# Patient Record
Sex: FEMALE | Race: WHITE | NOT HISPANIC OR LATINO | Employment: FULL TIME | URBAN - METROPOLITAN AREA
[De-identification: names, ages, dates, MRNs, and addresses within clinical notes are randomized per-mention and may not be internally consistent; named-entity substitution may affect disease eponyms.]

---

## 2017-11-29 ENCOUNTER — ALLSCRIPTS OFFICE VISIT (OUTPATIENT)
Dept: OTHER | Facility: OTHER | Age: 42
End: 2017-11-29

## 2017-11-30 ENCOUNTER — TRANSCRIBE ORDERS (OUTPATIENT)
Dept: ADMINISTRATIVE | Facility: HOSPITAL | Age: 42
End: 2017-11-30

## 2017-11-30 DIAGNOSIS — R06.83 SNORING: Primary | ICD-10-CM

## 2017-11-30 DIAGNOSIS — R53.83 OTHER FATIGUE: ICD-10-CM

## 2017-11-30 NOTE — PROGRESS NOTES
Assessment    1  Obesity, morbid (278 01) (E66 01)   2  Anxiety disorder (300 00) (F41 9)   3  Obstructive sleep apnea, adult (327 23) (G47 33)    Plan  Anxiety disorder    · Escitalopram Oxalate 10 MG Oral Tablet; Take 1 tablet daily  Depression    · BuPROPion HCl ER (XL) 150 MG Oral Tablet Extended Release 24 Hour (Wellbutrin XL); Take1 tablet twice daily  Obesity, morbid    · Referral to Weight Management [facility]  *  Status: Hold For - Scheduling Requested for: [id number]  Care Summary provided  : Yes  Obstructive sleep apnea, adult    · *Polysomnography, Sleep Study, CPAP/BiPAP titration; Status:Need Information - FinancialAuthorization; Requested for:29Nov2017;   there any other medical conditions or medications that would explain these    symptoms? : No  is the sleep disturbance affecting the patient's ability to function? : Fatigue    associated with the tiredness and tiredness and fatigue altogether causing depression  History/Symptoms: : Fatgue weight gain and depression  Study Only or Consult : Sleep Study and Consult and F/U with Sleep Specialist    Discussion/Summary  Counseling Documentation With Imm: The patient was counseled regarding diagnostic results,-- prognosis,-- impressions  Chief Complaint  Chief Complaint Free Text Note Form: pt  presents for follow up for depression  pt has not been taking Meds  History of Present Illness  Depression (Follow-Up): The patient states her depression has improved since the last visit  She describes this as moderate in severity  Interval Symptoms: worsened depression,-- worsened depressed mood,-- worsened loss of interest or pleasure in activities,-- worsened insomnia-- and-- worsened anxiety     Weight Gain:   Jaclyn Ruvalcaba presents with complaints of gradual onset of 30 - 39 pound weight gain (lost 7 lbs )  Associated symptoms include anxiety-- and-- depression, but-- no increased appetite,-- no constipation,-- no polyuria,-- no polydipsia,-- no leg edema-- and-- no facial swelling  Generalized Anxiety Disorder (Brief): The patient is being seen for an initial evaluation of an existing diagnosis of anxiety disorder  The patient presents with complaints of palpitations (better than better )  The patient is currently experiencing symptoms  Associated symptoms:  poor concentration, but-- no memory impairment  Review of Systems  Complete-Female:  Constitutional: feeling poorly-- and-- feeling tired, but-- No fever, no chills, feels well, no tiredness, no recent weight gain or weight loss  Eyes: No complaints of eye pain, no red eyes, no eyesight problems, no discharge, no dry eyes, no itching of eyes  ENT: no complaints of earache, no loss of hearing, no nose bleeds, no nasal discharge, no sore throat, no hoarseness  Cardiovascular: No complaints of slow heart rate, no fast heart rate, no chest pain, no palpitations, no leg claudication, no lower extremity edema  Respiratory: No complaints of shortness of breath, no wheezing, no cough, no SOB on exertion, no orthopnea, no PND  Gastrointestinal: No complaints of abdominal pain, no constipation, no nausea or vomiting, no diarrhea, no bloody stools  Genitourinary: No complaints of dysuria, no incontinence, no pelvic pain, no dysmenorrhea, no vaginal discharge or bleeding  Musculoskeletal: No complaints of arthralgias, no myalgias, no joint swelling or stiffness, no limb pain or swelling  Integumentary: No complaints of skin rash or lesions, no itching, no skin wounds, no breast pain or lump  Neurological: No complaints of headache, no confusion, no convulsions, no numbness, no dizziness or fainting, no tingling, no limb weakness, no difficulty walking  Psychiatric: as noted in HPI  Endocrine: No complaints of proptosis, no hot flashes, no muscle weakness, no deepening of the voice, no feelings of weakness    Hematologic/Lymphatic: No complaints of swollen glands, no swollen glands in the neck, does not bleed easily, does not bruise easily  Active Problems  1  Anxiety disorder (300 00) (F41 9)   2  Depression (311) (F32 9)   3  Hypercholesterolemia (272 0) (E78 00)   4  Obesity, morbid (278 01) (E66 01)    Past Medical History  1  History of Acute bronchitis (466 0) (J20 9)   2  History of Acute upper respiratory infection (465 9) (J06 9)   3  Denied: History of Carrier Of STD   4  History of Dizziness (780 4) (R42)   5  History of Fatigue (780 79) (R53 83)   6  History of anemia (V12 3) (Z86 2)   7  History of Irritable bowel syndrome (564 1) (K58 9)   8  History of Irritable bowel syndrome (564 1) (K58 9)   9  Denied: History of Mental Status Change   10  History of Need for prophylactic vaccination and inoculation against influenza (V04 81) (Z23)   11  History of Visit for dental examination (V72 2) (Z01 20)   12  History of Visit For: Routine Eye Exam (V72 0)   13  History of Weight gain (783 1) (R63 5)    Surgical History  1  History of Ankle Arthroscopy   2  History of Arthroscopy Ankle Left   3  History of Breast Surgery Reduction Procedure    Family History  Mother    1  Family history of Type 2 Diabetes Mellitus  Father    2  Family history of Hypertension (V17 49)   3  Family history of Type 2 Diabetes Mellitus    Social History     · Alcohol Use (History)   · Being A Social Drinker   · Denied: History of Exercising Regularly   · Marital History - Single   · Never A Smoker   · Never Used Drugs   · Occupation:    Current Meds   1  BuPROPion HCl ER (XL) 150 MG Oral Tablet Extended Release 24 Hour; Take 1 tablet twice daily; Therapy: 05YCP7724 to (Evaluate:16Kqm9299)  Requested for: 09UEB9768; Last Rx:01Wvf7023 Ordered   2  Escitalopram Oxalate 10 MG Oral Tablet; Take 1 tablet daily; Therapy: 59FDL2910 to (Benji Forward)  Requested for: 92XVH9564; Last Rx:94Eya6424 Ordered    Allergies  1   No Known Drug Allergies    Vitals  Vital Signs    Recorded: 29Nov2017 04:25PM Temperature 97 3 F, Tympanic   Heart Rate 336   Systolic 895, LUE, Sitting   Diastolic 92, LUE, Sitting   Height 5 ft 2 in   Weight 256 lb 6 oz   BMI Calculated 46 89   BSA Calculated 2 12   O2 Saturation 99, RA       Physical Exam   Constitutional  General appearance: No acute distress, well appearing and well nourished  Ears, Nose, Mouth, and Throat  External inspection of ears and nose: Normal    Otoscopic examination: Tympanic membranes translucent with normal light reflex  Canals patent without erythema  Pulmonary  Auscultation of lungs: Clear to auscultation  Cardiovascular  Auscultation of heart: Normal rate and rhythm, normal S1 and S2, without murmurs  Examination of extremities for edema and/or varicosities: Normal    Carotid pulses: Normal    Abdomen  Abdomen: Non-tender, no masses  Liver and spleen: No hepatomegaly or splenomegaly  Lymphatic  Palpation of lymph nodes in neck: No lymphadenopathy  Musculoskeletal  Gait and station: Normal    Digits and nails: Normal without clubbing or cyanosis  Inspection/palpation of joints, bones, and muscles: Normal    Skin  Skin and subcutaneous tissue: Normal without rashes or lesions  Neurologic  Cranial nerves: Cranial nerves 2-12 intact  Reflexes: 2+ and symmetric  Sensation: No sensory loss  Psychiatric  Orientation to person, place, and time: Normal    Mood and affect: Normal          Results/Data  PHQ-9 Adult Depression Screening 87YNW0167 04:31PM User, [facility]     Test Name Result Flag Reference   PHQ-9 Adult Depression Score 8       Over the last two weeks, how often have you been bothered by any of the following problems?  Little interest or pleasure in doing things: More than half the days - 2 Feeling down, depressed, or hopeless: More than half the days - 2 Trouble falling or staying asleep, or sleeping too much: Several days - 1 Feeling tired or having little energy: Nearly every day - 3 Poor appetite or over eating: Not at all - 0 Feeling bad about yourself - or that you are a failure or have let yourself or your family down: Not at all - 0 Trouble concentrating on things, such as reading the newspaper or watching television: Not at all - 0 Moving or speaking so slowly that other people could have noticed   Or the opposite -  being so fidgety or restless that you have been moving around a lot more than usual: Not at all - 0 Thoughts that you would be better off dead, or of hurting yourself in some way: Not at all - 0   PHQ-9 Adult Depression Screening Negative     PHQ-9 Difficulty Level Somewhat difficult     PHQ-9 Severity Mild Depression           Signatures   Electronically signed by : Aaron Plascencia MD; Nov 29 2017  4:56PM EST                       (Author)

## 2018-01-03 ENCOUNTER — ALLSCRIPTS OFFICE VISIT (OUTPATIENT)
Dept: OTHER | Facility: OTHER | Age: 43
End: 2018-01-03

## 2018-01-03 DIAGNOSIS — F32.9 MAJOR DEPRESSIVE DISORDER, SINGLE EPISODE: ICD-10-CM

## 2018-01-03 DIAGNOSIS — E78.00 PURE HYPERCHOLESTEROLEMIA: ICD-10-CM

## 2018-01-03 DIAGNOSIS — F41.9 ANXIETY DISORDER: ICD-10-CM

## 2018-01-03 DIAGNOSIS — E66.01 MORBID (SEVERE) OBESITY DUE TO EXCESS CALORIES (HCC): ICD-10-CM

## 2018-01-04 ENCOUNTER — TRANSCRIBE ORDERS (OUTPATIENT)
Dept: ADMINISTRATIVE | Age: 43
End: 2018-01-04

## 2018-01-04 ENCOUNTER — APPOINTMENT (OUTPATIENT)
Dept: LAB | Age: 43
End: 2018-01-04
Payer: COMMERCIAL

## 2018-01-04 ENCOUNTER — GENERIC CONVERSION - ENCOUNTER (OUTPATIENT)
Dept: OTHER | Facility: OTHER | Age: 43
End: 2018-01-04

## 2018-01-04 DIAGNOSIS — E78.00 PURE HYPERCHOLESTEROLEMIA: ICD-10-CM

## 2018-01-04 DIAGNOSIS — F41.9 ANXIETY DISORDER: ICD-10-CM

## 2018-01-04 DIAGNOSIS — E66.01 MORBID (SEVERE) OBESITY DUE TO EXCESS CALORIES (HCC): ICD-10-CM

## 2018-01-04 DIAGNOSIS — F32.9 MAJOR DEPRESSIVE DISORDER, SINGLE EPISODE: ICD-10-CM

## 2018-01-04 LAB
ALBUMIN SERPL BCP-MCNC: 3.5 G/DL (ref 3.5–5)
ALP SERPL-CCNC: 90 U/L (ref 46–116)
ALT SERPL W P-5'-P-CCNC: 25 U/L (ref 12–78)
ANION GAP SERPL CALCULATED.3IONS-SCNC: 8 MMOL/L (ref 4–13)
AST SERPL W P-5'-P-CCNC: 16 U/L (ref 5–45)
BILIRUB SERPL-MCNC: 0.42 MG/DL (ref 0.2–1)
BUN SERPL-MCNC: 11 MG/DL (ref 5–25)
CALCIUM SERPL-MCNC: 9.1 MG/DL (ref 8.3–10.1)
CHLORIDE SERPL-SCNC: 103 MMOL/L (ref 100–108)
CHOLEST SERPL-MCNC: 164 MG/DL (ref 50–200)
CO2 SERPL-SCNC: 26 MMOL/L (ref 21–32)
CREAT SERPL-MCNC: 0.78 MG/DL (ref 0.6–1.3)
GFR SERPL CREATININE-BSD FRML MDRD: 95 ML/MIN/1.73SQ M
GLUCOSE P FAST SERPL-MCNC: 100 MG/DL (ref 65–99)
HDLC SERPL-MCNC: 36 MG/DL (ref 40–60)
INSULIN SERPL-ACNC: 35.3 MU/L (ref 3–25)
LDLC SERPL CALC-MCNC: 101 MG/DL (ref 0–100)
POTASSIUM SERPL-SCNC: 3.9 MMOL/L (ref 3.5–5.3)
PROT SERPL-MCNC: 7.3 G/DL (ref 6.4–8.2)
SODIUM SERPL-SCNC: 137 MMOL/L (ref 136–145)
T4 FREE SERPL-MCNC: 0.97 NG/DL (ref 0.76–1.46)
TRIGL SERPL-MCNC: 134 MG/DL
TSH SERPL DL<=0.05 MIU/L-ACNC: 4.46 UIU/ML (ref 0.36–3.74)

## 2018-01-04 PROCEDURE — 80053 COMPREHEN METABOLIC PANEL: CPT

## 2018-01-04 PROCEDURE — 36415 COLL VENOUS BLD VENIPUNCTURE: CPT

## 2018-01-04 PROCEDURE — 83525 ASSAY OF INSULIN: CPT

## 2018-01-04 PROCEDURE — 84443 ASSAY THYROID STIM HORMONE: CPT

## 2018-01-04 PROCEDURE — 84439 ASSAY OF FREE THYROXINE: CPT

## 2018-01-04 PROCEDURE — 80061 LIPID PANEL: CPT

## 2018-01-14 VITALS
BODY MASS INDEX: 47.18 KG/M2 | SYSTOLIC BLOOD PRESSURE: 132 MMHG | TEMPERATURE: 97.3 F | HEIGHT: 62 IN | DIASTOLIC BLOOD PRESSURE: 92 MMHG | OXYGEN SATURATION: 99 % | HEART RATE: 110 BPM | WEIGHT: 256.38 LBS

## 2018-01-23 ENCOUNTER — HOSPITAL ENCOUNTER (OUTPATIENT)
Dept: SLEEP CENTER | Facility: CLINIC | Age: 43
Discharge: HOME | End: 2018-01-23
Payer: COMMERCIAL

## 2018-01-23 VITALS
RESPIRATION RATE: 15 BRPM | TEMPERATURE: 97.4 F | BODY MASS INDEX: 44.81 KG/M2 | HEIGHT: 63 IN | SYSTOLIC BLOOD PRESSURE: 118 MMHG | DIASTOLIC BLOOD PRESSURE: 76 MMHG | HEART RATE: 72 BPM | WEIGHT: 252.9 LBS

## 2018-01-23 DIAGNOSIS — R06.83 SNORING: ICD-10-CM

## 2018-01-23 DIAGNOSIS — R53.83 OTHER FATIGUE: ICD-10-CM

## 2018-01-23 DIAGNOSIS — G47.33 OSA (OBSTRUCTIVE SLEEP APNEA): ICD-10-CM

## 2018-01-23 PROCEDURE — 95810 POLYSOM 6/> YRS 4/> PARAM: CPT

## 2018-01-23 NOTE — RESULT NOTES
Verified Results  (1) COMPREHENSIVE METABOLIC PANEL 32MDS1801 97:41XE [facility] Order Number: [id number]     Test Name Result Flag Reference   SODIUM 137 mmol/L  136-145   POTASSIUM 3 9 mmol/L  3 5-5 3   CHLORIDE 103 mmol/L  100-108   CARBON DIOXIDE 26 mmol/L  21-32   ANION GAP (CALC) 8 mmol/L  4-13   BLOOD UREA NITROGEN 11 mg/dL  5-25   CREATININE 0 78 mg/dL  0 60-1 30   Standardized to IDMS reference method   CALCIUM 9 1 mg/dL  8 3-10 1   BILI, TOTAL 0 42 mg/dL  0 20-1 00   ALK PHOSPHATAS 90 U/L     ALT (SGPT) 25 U/L  12-78   Specimen collection should occur prior to Sulfasalazine and/or Sulfapyridine administration due to the potential for falsely depressed results  AST(SGOT) 16 U/L  5-45   Specimen collection should occur prior to Sulfasalazine administration due to the potential for falsely depressed results  ALBUMIN 3 5 g/dL  3 5-5 0   TOTAL PROTEIN 7 3 g/dL  6 4-8 2   eGFR 95 ml/min/1 73sq m     National Kidney Disease Education Program recommendations are as follows:  GFR calculation is accurate only with a steady state creatinine  Chronic Kidney disease less than 60 ml/min/1 73 sq  meters  Kidney failure less than 15 ml/min/1 73 sq  meters  GLUCOSE FASTING 100 mg/dL H 65-99   Specimen collection should occur prior to Sulfasalazine administration due to the potential for falsely depressed results  Specimen collection should occur prior to Sulfapyridine administration due to the potential for falsely elevated results  (1) LIPID PANEL, FASTING 85UIZ8077 08:28AM [facility] Order Number: [id number]     Test Name Result Flag Reference   CHOLESTEROL 164 mg/dL     HDL,DIRECT 36 mg/dL L 40-60   Specimen collection should occur prior to Metamizole administration due to the potential for falsley depressed results     LDL CHOLESTEROL CALCULATED 101 mg/dL H 0-100   Triglyceride:        Normal <150 mg/dl   Borderline High 150-199 mg/dl   High 200-499 mg/dl   Very High >499 mg/dl      Cholesterol:       Desirable <200 mg/dl    Borderline High 200-239 mg/dl    High >239 mg/dl      HDL Cholesterol:       High>59 mg/dL    Low <41 mg/dL      This screening LDL is a calculated result  It does not have the accuracy of the Direct Measured LDL in the monitoring of patients with hyperlipidemia and/or statin therapy  Direct Measure LDL (UUP137) must be ordered separately in these patients  TRIGLYCERIDES 134 mg/dL  <=150   Specimen collection should occur prior to N-Acetylcysteine or Metamizole administration due to the potential for falsely depressed results  (1) TSH WITH FT4 REFLEX 96XFX4501 08:28AM Laura Ovi    Order Number: [id number]     Test Name Result Flag Reference   TSH 4 460 uIU/mL H 0 358-3 740   Patients undergoing fluorescein dye angiography may retain small amounts of fluorescein in the body for 48-72 hours post procedure  Samples containing fluorescein can produce falsely depressed TSH values  If the patient had this procedure,a specimen should be resubmitted post fluorescein clearance  The recommended reference ranges for TSH during pregnancy are as follows:  First trimester 0 1 to 2 5 uIU/mL  Second trimester  0 2 to 3 0 uIU/mL  Third trimester 0 3 to 3 0 uIU/m   T4,FREE 0 97 ng/dL  0 76-1 46   Specimen collection should occur prior to Sulfasalazine administration due to the potential for falsely elevated results       (1) INSULIN, FASTING 89EFH7355 08:28AM Justine Aponte Order Number: [id number]     Test Name Result Flag Reference   INSULIN, FASTING 35 3 mU/L H 3 0-25 0

## 2018-01-23 NOTE — PROGRESS NOTES
Message  Reviewed VLCD diet principles  Developed meal plan and reviewed product use  Ordered 2 week supply  Gave patient written VLCD packet and left patient contact number  Will call patent in 2-3 days to see how tolerating the VLCD diet  Active Problems    1  Anxiety disorder (300 00) (F41 9)   2  Depression (311) (F32 9)   3  Hypercholesterolemia (272 0) (E78 00)   4  Obesity, morbid (278 01) (E66 01)   5  Obstructive sleep apnea, adult (327 23) (G47 33)    Current Meds   1  BuPROPion HCl ER (XL) 150 MG Oral Tablet Extended Release 24 Hour (Wellbutrin XL); Take 1 tablet twice daily; Therapy: 25GHZ2012 to (Venia January)  Requested for: 64UNF9194; Last   Rx:29Nov2017 Ordered    Allergies    1   No Known Drug Allergies    Signatures   Electronically signed by : Emily Sher, ; Vladimir  3 2018  2:45PM EST                       (Author)

## 2018-01-23 NOTE — CONSULTS
Chief Complaint  Chief Complaint Free Text Note Form: New patient here for MWM consult; Rosalba Campbell 3/8      History of Present Illness  Free Text HPI: Obesity-  Severity: Severe  Onset: since childhood  Modifiers: family hx, occasional stress eating, mindless eating; has tried low carb, [employer], and diet and exercise  Associated Symptoms: increased joint pain, out of breath w/ activity    Goal: wants to feel better    B-cereal or oatmeal w/ 2% milk or pre-made sandwich  S-fruit  L-sandwich w/ deli meat and cheese w/ pretzels or salad  S-unhealthy snack  D-slightly larger portions-home cooked-protein,starch and veggie  S- sometimes-ice cream or popcorn  Dines out once week  Drinks: water-~75oz, unsweetened iced tea, sweetened beverage once per week    Exercise: none     Past Medical History    1  History of Acute bronchitis (466 0) (J20 9)   2  History of Acute upper respiratory infection (465 9) (J06 9)   3  Denied: History of Carrier Of STD   4  History of Dizziness (780 4) (R42)   5  History of Fatigue (780 79) (R53 83)   6  History of anemia (V12 3) (Z86 2)   7  History of Irritable bowel syndrome (564 1) (K58 9)   8  History of Irritable bowel syndrome (564 1) (K58 9)   9  Denied: History of Mental Status Change   10  History of Need for prophylactic vaccination and inoculation against influenza (V04 81)    (Z23)   11  History of Visit for dental examination (V72 2) (Z01 20)   12  History of Visit For: Routine Eye Exam (V72 0)   13  History of Weight gain (783 1) (R63 5)  Active Problems And Past Medical History Reviewed: The active problems and past medical history were reviewed and updated today  Assessment    1  Obesity, morbid (278 01) (E66 01)   2  Hypercholesterolemia (272 0) (E78 00)   3  Depression (311) (F32 9)   4  Anxiety disorder (300 00) (F41 9)    Plan   1  (1) COMPREHENSIVE METABOLIC PANEL; Status:Active; Requested ANI:03ECC6705;    2  (1) INSULIN, FASTING; Status:Active;  Requested [id number];    3  (1) LIPID PANEL, FASTING; Status:Active; Requested XBX:95ATL8987;    4  (1) TSH WITH FT4 REFLEX; Status:Active; Requested IVW:85MWK9049; Discussion/Summary  Discussion Summary:     53-year-old female with depression/anxiety, Obstructive sleep apnea, hyperlipidemia and morbid obesity here to pursue medical weight management to improve weight and health  Obesity class 3:  -discussed options of conservative vs BRET program +/- meal replacement vs VLCD and bariatric surgery  -initial focus of 5-10% weight loss over 3-6 mos for improved health  -screening labs    Hyperlipidemia:  -repeat FLP  -lifestyle changes for now    Anxiety/depression:  -on bupropion and Lexapro  -could consider adding naltrexone to mimic Contrave    Patient is interested in very low-calorie diet  Will set up visit with dietitian  Patient's Capacity to Self-Care: Patient is able to Self-Care  Bariatric Medicine Diagnostic Constellation:   Patient Discussion: 45 minute visit, greater than half of the time was spent on counseling  Counseling spent on surgical and nonsurgical interventions for the treatment of excess weight including intragastric balloon  Discussed the advantages and long-term outcomes with regards to bariatric surgery  Discussed in detail nonsurgical options including intensive lifestyle intervention program, very low-calorie diet program and conservative program   Discussed the role of weight loss medications  Counseled patient on diet behavior and exercise modification for weight loss  Understands and agrees with treatment plan: The treatment plan was reviewed with the patient/guardian  The patient/guardian understands and agrees with the treatment plan   Self Referrals:   Self Referrals: No      Active Problems    1  Anxiety disorder (300 00) (F41 9)   2  Depression (311) (F32 9)   3  Hypercholesterolemia (272 0) (E78 00)   4  Obesity, morbid (278 01) (E66 01)   5   Obstructive sleep apnea, adult (739 23) (G45 33)    Surgical History    1  History of Ankle Arthroscopy   2  History of Arthroscopy Ankle Left   3  History of Breast Surgery Reduction Procedure   4  History of Knee Surgery  Surgical History Reviewed: The surgical history was reviewed and updated today  Family History  Mother    1  Family history of Type 2 Diabetes Mellitus  Father    2  Family history of Hypertension (V17 49)   3  Family history of Type 2 Diabetes Mellitus  Family History Reviewed: The family history was reviewed and updated today  Social History    · Alcohol Use (History)   · Being A Social Drinker   · Denied: History of Exercising Regularly   · Marital History - Single   · Never A Smoker   · Never Used Drugs   · Occupation:  Social History Reviewed: The social history was reviewed and updated today  Current Meds   1  BuPROPion HCl ER (XL) 150 MG Oral Tablet Extended Release 24 Hour; Take 1 tablet   twice daily; Therapy: 70BXT9016 to (Margarita Francisco)  Requested for: 29Nov2017; Last   Rx:29Nov2017 Ordered  Medication List Reviewed: The medication list was reviewed and updated today  Allergies    1  No Known Drug Allergies    Vitals  Vital Signs    Recorded: 42WYQ5935 10:58AM   Temperature 97 4 F   Heart Rate 72   Respiration 15   Systolic 733   Diastolic 76   Height 5 ft 3 in   Weight 252 lb 14 4 oz   BMI Calculated 44 8   BSA Calculated 2 14   Waist Circumference 47 75   Neck Circumference 15 5     Physical Exam    Constitutional   General appearance: Abnormal   well developed and obese  Eyes No conjunctival pallor  Ears, Nose, Mouth, and Throat Slightly dry oral mucosa  Pulmonary   Respiratory effort: No increased work of breathing or signs of respiratory distress  Auscultation of lungs: Clear to auscultation  Cardiovascular   Auscultation of heart: Normal rate and rhythm, normal S1 and S2, without murmurs  Abdomen   Abdomen: Abnormal   The abdomen was obese   The abdomen was soft and nontender  Musculoskeletal   Gait and station: Normal     Psychiatric   Orientation to person, place, and time: Normal          Results/Data  STOP BANG Questionnaire 38CGJ8510 11:11AM User, [facility]     Test Name Result Flag Reference   STOP BANG Questionnaire Risk of MILTON Intermediate Risk     Snoring: Yes  Tired: Yes  Observed: No  Blood Pressure: No  BMI: Yes  Age: No  Neck Circumference: No  Gender: No   STOP BANG Questionnaire MILTON Total Score 3     Snoring: Yes  Tired: Yes  Observed: No  Blood Pressure: No  BMI: Yes  Age: No  Neck Circumference: No  Gender: No       Future Appointments    Date/Time Provider Specialty Site   01/03/2018 01:15 PM [facility]   01/18/2018 10:30 AM [facility]   02/01/2018 10:30 AM nAthony [facility]amping  WEIGHT MANAGEMENT CENTER   02/15/2018 10:30 AM KOLBY Lozano   Bariatric Medicine [facility]   01/10/2018 10:15 AM Ni Mcneal MD Internal Medicine [facility]     Signatures   Electronically signed by : KOLBY Mccoy ; Vladimir  3 2018 12:19PM EST                       (Author)

## 2018-01-29 ENCOUNTER — TELEPHONE (OUTPATIENT)
Dept: SLEEP CENTER | Facility: CLINIC | Age: 43
End: 2018-01-29

## 2018-01-29 ENCOUNTER — TRANSCRIBE ORDERS (OUTPATIENT)
Dept: SLEEP CENTER | Facility: CLINIC | Age: 43
End: 2018-01-29

## 2018-01-29 DIAGNOSIS — G47.33 OSA (OBSTRUCTIVE SLEEP APNEA): Primary | ICD-10-CM

## 2018-02-14 ENCOUNTER — OFFICE VISIT (OUTPATIENT)
Dept: BARIATRICS | Facility: CLINIC | Age: 43
End: 2018-02-14

## 2018-02-14 VITALS
HEIGHT: 63 IN | HEART RATE: 72 BPM | SYSTOLIC BLOOD PRESSURE: 110 MMHG | TEMPERATURE: 97.2 F | DIASTOLIC BLOOD PRESSURE: 68 MMHG | WEIGHT: 246.5 LBS | RESPIRATION RATE: 18 BRPM | BODY MASS INDEX: 43.68 KG/M2

## 2018-02-14 DIAGNOSIS — E66.01 OBESITY, CLASS III, BMI 40-49.9 (MORBID OBESITY) (HCC): Primary | ICD-10-CM

## 2018-02-14 PROCEDURE — RECHECK: Performed by: DIETITIAN, REGISTERED

## 2018-02-14 RX ORDER — BUPROPION HYDROCHLORIDE 150 MG/1
150 TABLET ORAL 2 TIMES DAILY
COMMUNITY
Start: 2018-01-23 | End: 2018-05-11 | Stop reason: SDUPTHER

## 2018-02-14 RX ORDER — ESCITALOPRAM OXALATE 10 MG/1
1 TABLET ORAL DAILY
COMMUNITY
Start: 2014-11-17 | End: 2018-03-14

## 2018-02-14 NOTE — PROGRESS NOTES
Bariatric Nutrition Assessment Note    Type of surgery    Preop    Surgeon: Dr Yoana Benjamin  38 y o   female     Wt with BMI of 25: 141lb   Pre-Op Excess Wt: 105 5 lb  Vitals:    02/14/18 0904   BP: 110/68   Pulse: 72   Resp: 18   Temp: (!) 97 2 °F (36 2 °C)   Ht 5ft  in  Wt 246 5 lb  BMI 43 7    Weight History   Onset of Obesity: Childhood  Family history of obesity: Yes  Wt Loss Attempts: Commercial Programs ([employer]rp, Trueffect Mini, etc )  Meal Replacements (Medifast, Slim Fast, etc )  Nutrition Counseling with RD  Medically supervised program at [facility]  Maximum Wt Lost: 50 lb    Review of History and Medications   Past Medical History:   Diagnosis Date    Morbid obesity ([address] )      Past Surgical History:   Procedure Laterality Date    ANKLE SURGERY      right    ANTERIOR CRUCIATE LIGAMENT REPAIR      KNEE CARTILAGE SURGERY      right    REDUCTION MAMMAPLASTY       Social History     Social History    Marital status: Single     Spouse name: N/A    Number of children: N/A    Years of education: N/A     Social History Main Topics    Smoking status: Never Smoker    Smokeless tobacco: Never Used    Alcohol use Yes      Comment: social    Drug use: No    Sexual activity: Not Asked     Other Topics Concern    None     Social History Narrative    None       Current Outpatient Prescriptions:     buPROPion (WELLBUTRIN XL) 150 mg 24 hr tablet, , Disp: , Rfl:     escitalopram (LEXAPRO) 10 mg tablet, Take 1 tablet by mouth daily, Disp: , Rfl:   Food Intake and Lifestyle Assessment   Food Intake Assessment completed via food log brought by patient  Beverage intake: water and coffee/tea  Protein supplement: none  Estimated protein intake per day: 60 grams  Estimated fluid intake per day: 64 ounces or more  Meals eaten away from home: 1 per week or less   Typical meal pattern: 3 meals per day and 1 snacks per day  Eating Behaviors: Consumption of high calorie/ high fat foods  Food allergies or intolerances: No Known Allergies  Cultural or Tenriism considerations: n/a    Physical Assessment  Physical Activity  Types of exercise: Walking  Current physical limitations: knee pain    Psychosocial Assessment   Support systems: spouse friend(s)  Socioeconomic factors: none noted    Nutrition Diagnosis  Diagnosis: Overweight / Obesity (NC-3 3)  Related to: Physical inactivity and Excessive energy intake  As Evidenced by: BMI >35     Nutrition Prescription: Recommend the following diet  Regular    Interventions and Teaching   Discussed pre-op and post-op nutrition guidelines  Patient educated and handouts provided  Surgical changes to stomach / GI  Capacity of post-surgery stomach  Diet progression  Adequate hydration  Sugar and fat restriction to decrease "dumping syndrome"  Exercise  Suggestions for pre-op diet  Protein supplements  Dietary and lifestyle changes  Techniques for self monitoring and keeping daily food journal  Vitamin / Mineral supplementation of Multivitamin with minerals and Vitamin D    Education provided to: patient    Barriers to learning: Craves sweet foods    Readiness to change: preparation    Prior research on procedure: discussed with provider, pre-op class and friends or family    Comprehension: demonstrated understanding     Expected Compliance: good  Recommendations  Pt is an appropriate candidate for surgery   Yes  Evaluation / Monitoring  Dietitian to Monitor: Eating pattern as discussed    Goals  Food journal, Complete lession plans 1-6 and food log 1500 calories or less    Time Spent:   1 Hour

## 2018-02-14 NOTE — PROGRESS NOTES
Bariatric Behavioral Health Evaluation    Presenting Problem: Shelia Walters, :  1975    Patient here to increase health, increase mobility, reduce chronic pain    Is the patient seeking Bariatric Surgery Eval? Yes  If yes how long have you researched this surgery option  Patient has been researching bariatric surgery for approximately 1 year  She states she has numerous community contacts with bariatric surgery    Realizes Post- Op Requirements? Yes Patient has discussed bariatric surgery with PCP    Pre-morbid level of function and history of present illness: Patient denies Axis I diagnosis or treatment  Has family members with health conditions    Psychiatric/Psychological Treatment Diagnosis: Patient denies    Outpatient Counselor No  Patient has never utilized    Psychiatrist No  Denies use    Have you had Inpatient Treatment?  No    Family Constellation (include relationship with each and Psych/Med HX)    Mother  obesity, Father  obesity and Siblings  obesity    Domestic Violence No     Childhood trauma:  No    Additional comments/stressors related to family/relationships/peer support:  Own health, weight and chronic pain, health of family members, work    Physical/Psychological Assessment:     Appearance: appropriate  Sociability: average  Affect: appropriate  Mood: calm  Thought Process: coherent  Speech: normal  Content: no impairment  Orientation: person  Yes , place  Yes , time  Yes , normal attention span  Yes , normal memory  Yes  , decreased in concentration ability  Yes  and normal judgement  Yes   Insight: emotional  good    Risk Assessment:     none    Recommendations: Recommended for surgery  yes    Risk of Harm to Self or Others:     Observation:     Interviews this interview only    Access to weapons yes     Weapons secured by:  Patient states at work    Based on the previous information, the client presents the following risk of harm to self or others: low    BARIATRIC SURGERY EDUCATION CHECKLIST    I have received education related to my bariatric surgery process and understand:    Patients may be required to complete a psychiatric evaluation and receive clearance for surgery from their psychiatrist     Patients who undergo weight loss surgery are at higher risk of increased mental health concerns and suicide attempts  Patients may be required to complete a full substance abuse evaluation and then complete all treatment recommendations prior to surgery  If diagnosis of abuse/dependence results, patient may be required to remain sober for one (1) year before having bariatric surgery  Patients on psychiatric medications should check with their provider to discuss psychiatric medications and the changes in absorption  Patient should discuss all time release medications with provider and take all medications as prescribed  The recommendation is that there is no use of  any tobacco products, Hookah or  vapes for the bariatric post-operation patient  Bariatric surgery patients should not consume alcohol as a post-operative patient as it may increase risk of numerous health conditions including but not limited to alcohol abuse and ulcers  There is a possibility of weight regain if patient does not follow all program guidelines and recommendations  Bariatric surgery patients should exercise thirty (30) to sixty (60) minutes per day to maintain post-surgical weight loss  Research indicates that bariatric patients are more successful when they see a therapist for up to two (2) years post-op  Patients will follow all medical and dietary recommendations provided  Patient will keep all scheduled appointments and follow up with their physician for a minimum of five (5) years  Patient will take all vitamins as recommended  Post-operative vitamins are life-long      Patient reviewed Bariatric Surgery Education Checklist and agrees they have received education on these issues   Note:  Patient denies Axis I diagnosis or treatment  Patient educated regarding the impact of nicotine and alcohol on the post bariatric surgery patient  Patient meets criteria for surgery at this program and is referred to physician

## 2018-02-15 DIAGNOSIS — E66.01 MORBID OBESITY (HCC): Primary | ICD-10-CM

## 2018-03-08 ENCOUNTER — OFFICE VISIT (OUTPATIENT)
Dept: SLEEP CENTER | Facility: CLINIC | Age: 43
End: 2018-03-08
Payer: COMMERCIAL

## 2018-03-08 VITALS
BODY MASS INDEX: 43.05 KG/M2 | HEART RATE: 80 BPM | WEIGHT: 243 LBS | DIASTOLIC BLOOD PRESSURE: 84 MMHG | HEIGHT: 63 IN | SYSTOLIC BLOOD PRESSURE: 128 MMHG

## 2018-03-08 DIAGNOSIS — G47.33 OSA (OBSTRUCTIVE SLEEP APNEA): ICD-10-CM

## 2018-03-08 PROCEDURE — 99243 OFF/OP CNSLTJ NEW/EST LOW 30: CPT | Performed by: INTERNAL MEDICINE

## 2018-03-08 NOTE — PROGRESS NOTES
Consultation - [address] : 1975  MRN: [id number]      Assessment:  The patient has moderate obstructive sleep apnea diagnosed on recent polysomnography and mild daytime sleepiness  Although she is somewhat reluctant to start positive airway pressure therapy, given the potential risks of bariatric surgery with regards to her sleep apnea, I encouraged her to get set up with the equipment  Plan:  APAP 4 to 20 cm    Follow up:  6 to 8 weeks for compliance check    History of Present Illness:   38 y  o female with a longstanding history of snoring, in the absence of witnessed apneas  In the past, she had more severe daytime sleepiness, which has improved over the last several months  She denies hypertension or other cardiovascular disease  She has occasional morning headache  She denies awakening with a choking or gasping sensation  Of note, her  has obstructive sleep apnea for which she had use CPAP for some time  Due to illness, he lost a great deal of weight and no longer requires positive airway pressure therapy      Review of Systems:      Genitourinary none   Cardiology none   Gastrointestinal heartburn/acid reflux   Neurology headaches   Constitutional none   Integumentary none   Psychiatry anxiety and depression   Musculoskeletal none   Pulmonary none   ENT nasal or sinus congestion and post nasal drip   Endocrine none   Hematological none           Historical Information    Past Medical History:  Obesity, anxiety and depression      Social History  History   Alcohol use: Not on file     History   Drug Use Not on file     History   Smoking Status    Not on file   Smokeless Tobacco    Not on file     Family History: non-contributory     Sleep History: See above     Sleep Schedule:  Unremarkable     Snoring/Apnea:  Yes to both    Medications/Allergies:    Current Outpatient Prescriptions:     buPROPion (WELLBUTRIN XL) 150 mg 24 hr tablet, Take 150 mg by mouth 2 (two) times a day  , Disp: , Rfl:     escitalopram (LEXAPRO) 10 mg tablet, Take 1 tablet by mouth daily, Disp: , Rfl:         No notes on file                  Objective:    Vital Signs:   Vitals:    03/08/18 1000   BP: 128/84   Pulse: 80   Weight: 110 kg (243 lb)   Height: 5' 3" (1 6 m)     Neck Circumference: 38      [place] Sleepiness Scale: Total score: 9    Physical Exam:    General: Alert, appropriate, cooperative, overweight    Head: NC/AT, no retrognathia    Nose: No septal deviation, nares partially obstructed, mucosa normal    Throat: Airway lumen narrowed, tongue base thickened, no tonsils visualized    Extremity: No clubbing, cyanosis    Skin: Warm, dry    Neuro: No motor abnormalities, cranial nerves appear intact    Sleep Study Results:   AHI = 15 4  PAP Pressure: Auto PAP: 4 to 20 cm to be initiated  DME Provider: [employer]    Counseling / Coordination of Care  Total clinic time spent today 25 minutes  Greater than 50% of total time was spent with the patient and / or family counseling and / or coordination of care  A description of the counseling / coordination of care: We discussed the pathophysiology of obstructive sleep apnea as well as the possible treatment options  We also discussed the rationale for positive airway pressure therapy  KOLBY Liang    Board Certified Sleep Specialist

## 2018-03-14 ENCOUNTER — OFFICE VISIT (OUTPATIENT)
Dept: CARDIOLOGY CLINIC | Facility: CLINIC | Age: 43
End: 2018-03-14
Payer: COMMERCIAL

## 2018-03-14 VITALS
WEIGHT: 241 LBS | SYSTOLIC BLOOD PRESSURE: 120 MMHG | BODY MASS INDEX: 42.7 KG/M2 | DIASTOLIC BLOOD PRESSURE: 78 MMHG | HEIGHT: 63 IN | HEART RATE: 67 BPM

## 2018-03-14 DIAGNOSIS — Z01.810 PREOPERATIVE CARDIOVASCULAR EXAMINATION: Primary | ICD-10-CM

## 2018-03-14 DIAGNOSIS — E66.01 MORBID OBESITY (HCC): ICD-10-CM

## 2018-03-14 PROCEDURE — 99243 OFF/OP CNSLTJ NEW/EST LOW 30: CPT | Performed by: INTERNAL MEDICINE

## 2018-03-14 PROCEDURE — 93000 ELECTROCARDIOGRAM COMPLETE: CPT | Performed by: INTERNAL MEDICINE

## 2018-03-14 NOTE — LETTER
March 14, 2018     Quezeny Basil, [address] [facility] [address] [facility]    Patient: Daniel Sandoval   YOB: 1975   Date of Visit: 3/14/2018       Dear Dr Wayne Metzger: Thank you for referring [place] Parariela to me for evaluation  Below are my notes for this consultation  If you have questions, please do not hesitate to call me  I look forward to following your patient along with you  Sincerely,        Bro Weiss MD        CC: No Recipients  Bro Weiss MD  3/14/2018 10:39 AM  Sign at close encounter                                             Cardiology Preoperative Visit    Daniel Sandoval  1975  [id number]  [address]    1  Preoperative cardiovascular examination  POCT ECG   2  [facility] ([facility] )         Discussion/Summary:    Morbid obesity  Comes for preoperative evaluation prior to bariatric surgery  No symptoms  Lipid panel reviewed with patient  Acceptable  Lifestyle modification  MILTON, to have CPAP  There are no cardiac contraindications to proceeding with the proposed surgery  The patient is at acceptable risk to proceed with no changes to medical therapy and no additional testing  History Of Present Illness:   38year old female  Depression and anxiety, both treated  She is morbidly obese, comes for preoperative evaluation prior to bariatric surgery  She exercises, denies any limitations with this  Has some shortness of breath with exercise, but not out of what she would expect  No chest pain  No PND, orthopnea, edema, palpitations, presyncope, syncope  Has sleep apnea - seen specialist, and to have CPAP titration study      Past Medical History:   Diagnosis Date    Morbid obesity ([facility] )      Social History     Social History    Marital status: Single     Spouse name: N/A    Number of children: N/A    Years of education: N/A Occupational History    Not on file  Social History Main Topics    Smoking status: Never Smoker    Smokeless tobacco: Never Used    Alcohol use Yes      Comment: social    Drug use: No    Sexual activity: Not on file     Other Topics Concern    Not on file     Social History Narrative    No narrative on file      Family History   Problem Relation Age of Onset    Hypertension Mother     Diabetes Mother     Heart attack Mother     Hypertension Father     Diabetes Father      Past Surgical History:   Procedure Laterality Date    ANKLE SURGERY      right    ANTERIOR CRUCIATE LIGAMENT REPAIR      KNEE CARTILAGE SURGERY      right    REDUCTION MAMMAPLASTY         Current Outpatient Prescriptions:     buPROPion (WELLBUTRIN XL) 150 mg 24 hr tablet, Take 150 mg by mouth 2 (two) times a day  , Disp: , Rfl:   No Known Allergies    Vitals:    03/14/18 1009   BP: 120/78   BP Location: Right arm   Patient Position: Sitting   Cuff Size: Large   Pulse: 67   Weight: 109 kg (241 lb)   Height: 5' 3" (1 6 m)     Vitals:    03/14/18 1009   Weight: 109 kg (241 lb)      Height: 5' 3" (160 cm)   Body mass index is 42 69 kg/m²  Physical Exam:  GENERAL: Alert, well appearing, and in no distress  HEENT:  PERRL, EOMI, no scleral icterus, no conjunctival pallor  NECK:  Supple, No elevated JVP, no thyromegaly, no carotid bruits  HEART:  Regular rate and rhythm, normal S1/S2, no S3/S4, no murmur or rub  LUNGS:  Clear to auscultation bilaterally  ABDOMEN:  Soft, non-tender, positive bowel sounds, no rebound or guarding  EXTREMITIES:  No edema  VASCULAR:  Normal pedal pulses   NEURO: No focal deficits,  SKIN: Normal without suspicious lesions on exposed skin      ROS:  Positive for snoring, fatigue, MILTON symptoms, heartburn, back pain, anxiety, depression  Except as noted in HPI, is otherwise reviewed in detail and a 12 point review of systems is negative      Labs:  Lab Results   Component Value Date     01/04/2018    K 3 9 01/04/2018     01/04/2018    CREATININE 0 78 01/04/2018    BUN 11 01/04/2018    CO2 26 01/04/2018    ALT 25 01/04/2018    AST 16 01/04/2018    GLUF 100 (H) 01/04/2018        Lab Results   Component Value Date    CHOL 164 01/04/2018     Lab Results   Component Value Date    HDL 36 (L) 01/04/2018     Lab Results   Component Value Date    LDLCALC 101 (H) 01/04/2018     Lab Results   Component Value Date    TRIG 134 01/04/2018     EKG:  Sinus rhythm, 67 beats per minute

## 2018-03-14 NOTE — PROGRESS NOTES
Cardiology Preoperative Visit    Marlene Sacks  1975  [id number]  [address] [facility]  [address] [place] [address] [address]    1  Preoperative cardiovascular examination  POCT ECG   2  Morbid obesity ([facility] 75 )         Discussion/Summary:    Morbid obesity  Comes for preoperative evaluation prior to bariatric surgery  No symptoms  Lipid panel reviewed with patient  Acceptable  Lifestyle modification  MILTON, to have CPAP  There are no cardiac contraindications to proceeding with the proposed surgery  The patient is at acceptable risk to proceed with no changes to medical therapy and no additional testing  History Of Present Illness:   38year old female  Depression and anxiety, both treated  She is morbidly obese, comes for preoperative evaluation prior to bariatric surgery  She exercises, denies any limitations with this  Has some shortness of breath with exercise, but not out of what she would expect  No chest pain  No PND, orthopnea, edema, palpitations, presyncope, syncope  Has sleep apnea - seen specialist, and to have CPAP titration study  Past Medical History:   Diagnosis Date    Morbid obesity ([place] [address] )      Social History     Social History    Marital status: Single     Spouse name: N/A    Number of children: N/A    Years of education: N/A     Occupational History    Not on file       Social History Main Topics    Smoking status: Never Smoker    Smokeless tobacco: Never Used    Alcohol use Yes      Comment: social    Drug use: No    Sexual activity: Not on file     Other Topics Concern    Not on file     Social History Narrative    No narrative on file      Family History   Problem Relation Age of Onset    Hypertension Mother     Diabetes Mother     Heart attack Mother     Hypertension Father     Diabetes Father      Past Surgical History:   Procedure Laterality Date    ANKLE SURGERY right    ANTERIOR CRUCIATE LIGAMENT REPAIR      KNEE CARTILAGE SURGERY      right    REDUCTION MAMMAPLASTY         Current Outpatient Prescriptions:     buPROPion (WELLBUTRIN XL) 150 mg 24 hr tablet, Take 150 mg by mouth 2 (two) times a day  , Disp: , Rfl:   No Known Allergies    Vitals:    03/14/18 1009   BP: 120/78   BP Location: Right arm   Patient Position: Sitting   Cuff Size: Large   Pulse: 67   Weight: 109 kg (241 lb)   Height: 5' 3" (1 6 m)     Vitals:    03/14/18 1009   Weight: 109 kg (241 lb)      Height: 5' 3" (160 cm)   Body mass index is 42 69 kg/m²  Physical Exam:  GENERAL: Alert, well appearing, and in no distress  HEENT:  PERRL, EOMI, no scleral icterus, no conjunctival pallor  NECK:  Supple, No elevated JVP, no thyromegaly, no carotid bruits  HEART:  Regular rate and rhythm, normal S1/S2, no S3/S4, no murmur or rub  LUNGS:  Clear to auscultation bilaterally  ABDOMEN:  Soft, non-tender, positive bowel sounds, no rebound or guarding  EXTREMITIES:  No edema  VASCULAR:  Normal pedal pulses   NEURO: No focal deficits,  SKIN: Normal without suspicious lesions on exposed skin      ROS:  Positive for snoring, fatigue, MILTON symptoms, heartburn, back pain, anxiety, depression  Except as noted in HPI, is otherwise reviewed in detail and a 12 point review of systems is negative  Labs:  Lab Results   Component Value Date     01/04/2018    K 3 9 01/04/2018     01/04/2018    CREATININE 0 78 01/04/2018    BUN 11 01/04/2018    CO2 26 01/04/2018    ALT 25 01/04/2018    AST 16 01/04/2018    GLUF 100 (H) 01/04/2018        Lab Results   Component Value Date    CHOL 164 01/04/2018     Lab Results   Component Value Date    HDL 36 (L) 01/04/2018     Lab Results   Component Value Date    LDLCALC 101 (H) 01/04/2018     Lab Results   Component Value Date    TRIG 134 01/04/2018     EKG:  Sinus rhythm, 67 beats per minute

## 2018-03-15 ENCOUNTER — OFFICE VISIT (OUTPATIENT)
Dept: BARIATRICS | Facility: CLINIC | Age: 43
End: 2018-03-15
Payer: COMMERCIAL

## 2018-03-15 VITALS
WEIGHT: 239 LBS | DIASTOLIC BLOOD PRESSURE: 74 MMHG | TEMPERATURE: 98.2 F | BODY MASS INDEX: 42.35 KG/M2 | SYSTOLIC BLOOD PRESSURE: 108 MMHG | RESPIRATION RATE: 18 BRPM | HEART RATE: 72 BPM | HEIGHT: 63 IN

## 2018-03-15 DIAGNOSIS — E66.01 MORBID (SEVERE) OBESITY DUE TO EXCESS CALORIES (HCC): Primary | ICD-10-CM

## 2018-03-15 PROCEDURE — 99203 OFFICE O/P NEW LOW 30 MIN: CPT | Performed by: SURGERY

## 2018-03-15 NOTE — PROGRESS NOTES
BARIATRIC CONSULT-INITIAL - BARIATRIC SURGERY  Jareth Bertrand 38 y o  female MRN: [id number]  Unit/Bed#:  Encounter: [id number]      HPI:  Jareth Bertrand is a 38 y o  female who presents with morbid obesity to discuss weight loss options  Review of Systems   Constitutional: Negative  HENT: Negative  Eyes: Negative  Respiratory: Negative  Cardiovascular: Negative  Gastrointestinal: Negative  Endocrine: Negative  Genitourinary: Negative  Musculoskeletal: Negative  Skin: Negative  Neurological: Negative  Hematological: Negative  Psychiatric/Behavioral: Negative  Historical Information   Past Medical History:   Diagnosis Date    Morbid obesity ([address] )      Past Surgical History:   Procedure Laterality Date    ANKLE SURGERY      right    ANTERIOR CRUCIATE LIGAMENT REPAIR      KNEE CARTILAGE SURGERY      right    REDUCTION MAMMAPLASTY       Social History   History   Alcohol Use    Yes     Comment: social     History   Drug Use No     History   Smoking Status    Never Smoker   Smokeless Tobacco    Never Used     Family History: non-contributory    Meds/Allergies   all medications and allergies reviewed  No Known Allergies    Objective       Current Vitals:   Blood Pressure: 108/74 (03/15/18 1436)  Pulse: 72 (03/15/18 1436)  Temperature: 98 2 °F (36 8 °C) (03/15/18 1436)  Respirations: 18 (03/15/18 1436)  Height: 5' 3" (160 cm) (03/15/18 1436)  Weight - Scale: 108 kg (239 lb) (03/15/18 1436)  Body mass index is 42 34 kg/m²  Invasive Devices          No matching active lines, drains, or airways          Physical Exam   Constitutional: She appears well-developed and well-nourished  Lab Results: I have personally reviewed pertinent lab results  Imaging: I have personally reviewed pertinent reports  EKG, Pathology, and Other Studies: I have personally reviewed pertinent reports        Code Status: [unfilled]  Advance Directive and Living Will: Power of :    POLST:      Assessment/PLAN:            Patient has a long history of morbid obesity and is presenting to discuss the surgical weight loss options  Despite the patient best efforts patient was unable to lose any meaningful or sustainable weight using nonsurgical means  We had a long discussion regarding all the surgical weight-loss options at our disposal at this point and reviewed the risks and benefits of each procedure in details as it relates to her age, BMI and medical conditions  Patient elected to undergo sleeve gastrectomy    Risks and benefits were explained to the patient  We also discussed the importance and need of a preoperative workup to make sure that the patient can undergo the procedure safely  Preoperative workup includes sleep apnea screening, cardiac evaluation, nutrition/psych and preoperative EGD  Risks and benefits of all the preoperative diagnostic tests were discussed with the patient including but not limited to the upper endoscopy  Alternatives to surgery and alternative forms of surgery were also explained  Postsurgical commitment and aftercare programs were discussed and explained to the patient in details   In terms of comorbidities patient suffers mostly of   Past Medical History:   Diagnosis Date    [facility] ([facility] )        I informed the patient that the rate of resolution of comorbid conditions following weight loss surgery is between 60 and 90% depending on the severity of the specific medical condition  I discussed and educated the patient regarding the different components of our multidisciplinary program and the importance of compliance and follow-up in the postoperative period  All questions answered  Patient understands risks and benefits  A videotape of the procedure was also shown to the patient   After showing the video we discussed all the technical aspects of the procedure and also the potential complications including but not limited to gastrointestinal perforation, leak, obstruction, stricture and hemorrhage  I spent 30 min with the patient more than 50% of the time was spent educating the patient and coordinating care

## 2018-03-15 NOTE — LETTER
March 15, 2018     [address]    Patient: Daniel Sandoval   YOB: 1975   Date of Visit: 3/15/2018       Dear Dr Cassius Dangelo:    Thank you for referring Harshshannon Posada to me for evaluation  Below are my notes for this consultation  If you have questions, please do not hesitate to call me  I look forward to following your patient along with you  Sincerely,        Cuba Gracia MD        CC: No Recipients  Cuba Gracia MD  3/15/2018  3:24 PM  Sign at close encounter      BARIATRIC CONSULT-INITIAL - BARIATRIC SURGERY  Daniel Sandoval 38 y o  female MRN: [id number]  Unit/Bed#:  Encounter: [id number]      HPI:  Daniel Sandoval is a 38 y o  female who presents with morbid obesity to discuss weight loss options  Review of Systems   Constitutional: Negative  HENT: Negative  Eyes: Negative  Respiratory: Negative  Cardiovascular: Negative  Gastrointestinal: Negative  Endocrine: Negative  Genitourinary: Negative  Musculoskeletal: Negative  Skin: Negative  Neurological: Negative  Hematological: Negative  Psychiatric/Behavioral: Negative          Historical Information   Past Medical History:   Diagnosis Date    Morbid obesity ([address] )      Past Surgical History:   Procedure Laterality Date    ANKLE SURGERY      right    ANTERIOR CRUCIATE LIGAMENT REPAIR      KNEE CARTILAGE SURGERY      right    REDUCTION MAMMAPLASTY       Social History   History   Alcohol Use    Yes     Comment: social     History   Drug Use No     History   Smoking Status    Never Smoker   Smokeless Tobacco    Never Used     Family History: non-contributory    Meds/Allergies   all medications and allergies reviewed  No Known Allergies    Objective       Current Vitals:   Blood Pressure: 108/74 (03/15/18 1436)  Pulse: 72 (03/15/18 1436)  Temperature: 98 2 °F (36 8 °C) (03/15/18 1436)  Respirations: 18 (03/15/18 1436)  Height: 5' 3" (160 cm) (03/15/18 1436)  Weight - Scale: 108 kg (239 lb) (03/15/18 1436)  Body mass index is 42 34 kg/m²  Invasive Devices          No matching active lines, drains, or airways          Physical Exam   Constitutional: She appears well-developed and well-nourished  Lab Results: I have personally reviewed pertinent lab results  Imaging: I have personally reviewed pertinent reports  EKG, Pathology, and Other Studies: I have personally reviewed pertinent reports  Code Status: [unfilled]  Advance Directive and Living Will:      Power of :    POLST:      Assessment/PLAN:            Patient has a long history of morbid obesity and is presenting to discuss the surgical weight loss options  Despite the patient best efforts patient was unable to lose any meaningful or sustainable weight using nonsurgical means  We had a long discussion regarding all the surgical weight-loss options at our disposal at this point and reviewed the risks and benefits of each procedure in details as it relates to her age, BMI and medical conditions  Patient elected to undergo sleeve gastrectomy    Risks and benefits were explained to the patient  We also discussed the importance and need of a preoperative workup to make sure that the patient can undergo the procedure safely  Preoperative workup includes sleep apnea screening, cardiac evaluation, nutrition/psych and preoperative EGD  Risks and benefits of all the preoperative diagnostic tests were discussed with the patient including but not limited to the upper endoscopy  Alternatives to surgery and alternative forms of surgery were also explained  Postsurgical commitment and aftercare programs were discussed and explained to the patient in details       In terms of comorbidities patient suffers mostly of   Past Medical History:   Diagnosis Date    Morbid [address] ([facility] [address] )        I informed the patient that the rate of resolution of comorbid conditions following weight loss surgery is between 60 and 90% depending on the severity of the specific medical condition  I discussed and educated the patient regarding the different components of our multidisciplinary program and the importance of compliance and follow-up in the postoperative period  All questions answered  Patient understands risks and benefits  A videotape of the procedure was also shown to the patient  After showing the video we discussed all the technical aspects of the procedure and also the potential complications including but not limited to gastrointestinal perforation, leak, obstruction, stricture and hemorrhage  I spent 30 min with the patient more than 50% of the time was spent educating the patient and coordinating care

## 2018-03-20 ENCOUNTER — ANESTHESIA EVENT (OUTPATIENT)
Dept: GASTROENTEROLOGY | Facility: HOSPITAL | Age: 43
End: 2018-03-20
Payer: COMMERCIAL

## 2018-03-21 ENCOUNTER — HOSPITAL ENCOUNTER (OUTPATIENT)
Facility: HOSPITAL | Age: 43
Setting detail: OUTPATIENT SURGERY
Discharge: HOME | End: 2018-03-21
Attending: SURGERY | Admitting: SURGERY
Payer: COMMERCIAL

## 2018-03-21 ENCOUNTER — ANESTHESIA (OUTPATIENT)
Dept: GASTROENTEROLOGY | Facility: HOSPITAL | Age: 43
End: 2018-03-21
Payer: COMMERCIAL

## 2018-03-21 VITALS
TEMPERATURE: 97 F | RESPIRATION RATE: 21 BRPM | WEIGHT: 238 LBS | HEIGHT: 63 IN | HEART RATE: 78 BPM | SYSTOLIC BLOOD PRESSURE: 117 MMHG | BODY MASS INDEX: 42.17 KG/M2 | OXYGEN SATURATION: 96 % | DIASTOLIC BLOOD PRESSURE: 69 MMHG

## 2018-03-21 DIAGNOSIS — E66.01 MORBID OBESITY (HCC): ICD-10-CM

## 2018-03-21 LAB — EXT PREGNANCY TEST URINE: NEGATIVE

## 2018-03-21 PROCEDURE — 43239 EGD BIOPSY SINGLE/MULTIPLE: CPT | Performed by: SURGERY

## 2018-03-21 PROCEDURE — 81025 URINE PREGNANCY TEST: CPT | Performed by: ANESTHESIOLOGY

## 2018-03-21 PROCEDURE — 88305 TISSUE EXAM BY PATHOLOGIST: CPT | Performed by: PATHOLOGY

## 2018-03-21 PROCEDURE — 88342 IMHCHEM/IMCYTCHM 1ST ANTB: CPT | Performed by: PATHOLOGY

## 2018-03-21 RX ORDER — SODIUM CHLORIDE 9 MG/ML
125 INJECTION, SOLUTION INTRAVENOUS CONTINUOUS
Status: DISCONTINUED | OUTPATIENT
Start: 2018-03-21 | End: 2018-03-21 | Stop reason: HOSPADM

## 2018-03-21 RX ORDER — PROPOFOL 10 MG/ML
INJECTION, EMULSION INTRAVENOUS AS NEEDED
Status: DISCONTINUED | OUTPATIENT
Start: 2018-03-21 | End: 2018-03-21 | Stop reason: SURG

## 2018-03-21 RX ADMIN — SODIUM CHLORIDE 125 ML/HR: 0.9 INJECTION, SOLUTION INTRAVENOUS at 07:39

## 2018-03-21 RX ADMIN — PROPOFOL 150 MG: 10 INJECTION, EMULSION INTRAVENOUS at 08:42

## 2018-03-21 RX ADMIN — LIDOCAINE HYDROCHLORIDE 60 MG: 20 INJECTION, SOLUTION INTRAVENOUS at 08:41

## 2018-03-21 NOTE — ANESTHESIA PREPROCEDURE EVALUATION
Review of Systems/Medical History  Patient summary reviewed        Cardiovascular  Negative cardio ROS    Pulmonary  Negative pulmonary ROS Sleep apnea Sleep Study completed,        GI/Hepatic  Negative GI/hepatic ROS          Negative  ROS        Endo/Other  Negative endo/other ROS   Obesity    GYN  Negative gynecology ROS          Hematology  Negative hematology ROS      Musculoskeletal  Negative musculoskeletal ROS        Neurology  Negative neurology ROS      Psychology   Negative psychology ROS              Physical Exam    Airway    Mallampati score: II  TM Distance: >3 FB  Neck ROM: full     Dental   No notable dental hx     Cardiovascular  Comment: Negative ROS, Rhythm: regular, Rate: normal, Cardiovascular exam normal    Pulmonary  Pulmonary exam normal Breath sounds clear to auscultation,     Other Findings        Anesthesia Plan  ASA Score- 3     Anesthesia Type- general with ASA Monitors  Additional Monitors:   Airway Plan:         Plan Factors-    Induction- intravenous  Postoperative Plan-     Informed Consent- Anesthetic plan and risks discussed with patient

## 2018-03-21 NOTE — OP NOTE
OPERATIVE REPORT  PATIENT NAME: Tita Lopez    :  1975  MRN: [id number]  Pt Location: [facility] 01    SURGERY DATE: 3/21/2018    Surgeon(s) and Role:     * Mercy Manzo MD - Primary    Preop Diagnosis:  Morbid obesity ([facility] [address] ) [E66 01]    Post-Op Diagnosis Codes:     * Morbid obesity ([facility] ) [E66 01]    Procedure(s) (LRB):  ESOPHAGOGASTRODUODENOSCOPY (EGD) with bx (N/A)    Specimen(s):  * No specimens in log *    Estimated Blood Loss:   Minimal    Drains:       Anesthesia Type:   IV Sedation with Anesthesia    Operative Indications: Morbid obesity ([facility] ) [E66 01]      Operative Findings:  Normal findings    Complications:   None    Procedure and Technique:  Upper endoscopy and biopsy   I was present for the entire procedure    Patient Disposition:  hemodynamically stable             Indication:   Patient suffers from morbid obesity and associated co-morbidities  and failed to achieve any meaningful or sustainable weight loss  Patient presented for a bariatric procedure and was consented for a preoperative upper endoscopy and biopsy to rule out H  Pylori  Description of the procedure: The patient was brought to the endoscopy suite and was placed in  left lateral decubitus position  A time-out was called and the patient was identified by name and by armband  The patient received IV  Propofol under closed monitoring  by the anesthesiologist and nurse anesthesist     Upper endoscopy was performed under direct visualization  Esophagus was visualized and showed normal findings   The GE junction was normal   The stomach was then inspected and showed  normal findings    First and second portion of duodenum were inspected and appeared to be normal  Biopsy was taken with a punch biopsy forceps from the antrum and sent to pathology to rule out H  Pylori   Retroflex view of the GE junction was obtained and was normal      The patient tolerated the procedure well and was transferred to the recovery unit in stable condition           I    SIGNATURE: Nicole Estrada MD  DATE: March 21, 2018  TIME: 8:42 AM

## 2018-03-21 NOTE — H&P
H&P EXAM - Outpatient Endoscopy   [facility] [address] [facility] INTRA   Delta Nose 38 y o  female MRN: [id number]  Unit/Bed#: ENDO POOL Encounter: [id number]        Impression: Morbid obesity    Plan:Upper endoscopy and a biopsy to rule out H  Pylori    Chief Complaint: Morbid obesity and preoperative endoscopy    Physical Exam: Normal not in acute distress   Chest: Clear to auscultation   Heart: Normal S1 and S2

## 2018-03-21 NOTE — DISCHARGE INSTRUCTIONS
Dr Dallas Almonte office phone number----[phone]    Upper Endoscopy   WHAT YOU NEED TO KNOW:   An upper endoscopy is also called an upper gastrointestinal (GI) endoscopy, or an esophagogastroduodenoscopy (EGD)  You may feel bloated, gassy, or have some abdominal discomfort after your procedure  Your throat may be sore for 24 to 36 hours  You may burp or pass gas from air that is still inside your body  DISCHARGE INSTRUCTIONS:   Call 911 if:   · You have sudden chest pain or trouble breathing  Seek care immediately if:   · You feel dizzy or faint  · You have trouble swallowing  · You have severe throat pain  · Your bowel movements are very dark or black  · Your abdomen is hard and firm and you have severe pain  · You vomit blood  Contact your healthcare provider if:   · You feel full or bloated and cannot burp or pass gas  · You have not had a bowel movement for 3 days after your procedure  · You have neck pain  · You have a fever or chills  · You have nausea or are vomiting  · You have a rash or hives  · You have questions or concerns about your endoscopy  Relieve a sore throat:  Suck on throat lozenges or crushed ice  Gargle with a small amount of warm salt water  Mix 1 teaspoon of salt and 1 cup of warm water to make salt water  Relieve gas and discomfort from bloating:  Lie on your right side with a heating pad on your abdomen  Take short walks to help pass gas  Eat small meals until bloating is relieved  Rest after your procedure:  Do not drive or make important decisions until the day after your procedure  Return to your normal activity as directed  You can usually return to work the day after your procedure  Follow up with your healthcare provider as directed:  Write down your questions so you remember to ask them during your visits     © 2017 [facility] Information is for End User's use only and may not be sold, redistributed or otherwise used for commercial purposes  All illustrations and images included in CareNotes® are the copyrighted property of A D A M , Inc  or Ron Hayes  The above information is an  only  It is not intended as medical advice for individual conditions or treatments  Talk to your doctor, nurse or pharmacist before following any medical regimen to see if it is safe and effective for you

## 2018-03-22 ENCOUNTER — HOSPITAL ENCOUNTER (OUTPATIENT)
Dept: SLEEP CENTER | Facility: CLINIC | Age: 43
Discharge: HOME | End: 2018-03-22

## 2018-04-04 ENCOUNTER — TELEPHONE (OUTPATIENT)
Dept: BARIATRICS | Facility: CLINIC | Age: 43
End: 2018-04-04

## 2018-04-16 PROBLEM — E66.01 OBESITY, CLASS III, BMI 40-49.9 (MORBID OBESITY) (HCC): Status: ACTIVE | Noted: 2018-04-16

## 2018-04-16 PROBLEM — E66.813 OBESITY, CLASS III, BMI 40-49.9 (MORBID OBESITY): Status: ACTIVE | Noted: 2018-04-16

## 2018-04-18 ENCOUNTER — ANESTHESIA EVENT (OUTPATIENT)
Dept: PERIOP | Facility: HOSPITAL | Age: 43
DRG: 621 | End: 2018-04-18
Payer: COMMERCIAL

## 2018-04-19 RX ORDER — MULTIVITAMIN
1 TABLET ORAL DAILY
COMMUNITY

## 2018-04-19 NOTE — PRE-PROCEDURE INSTRUCTIONS
Pre-Surgery Instructions:   Medication Instructions    buPROPion (WELLBUTRIN XL) 150 mg 24 hr tablet Patient was instructed by Physician and understands   CALCIUM PO Patient was instructed by Physician and understands   Multiple Vitamin (MULTIVITAMIN) tablet Patient was instructed by Physician and understands  Pt given/reviewed [facility] preop instructions and chlorhexadine soap   Pt to hold asa/NSAIDS/vitamins/herbal supplements one week before surgery or as per Dr Tj Cloud

## 2018-04-19 NOTE — ANESTHESIA PREPROCEDURE EVALUATION
Review of Systems/Medical History  Patient summary reviewed  Chart reviewed  History of anesthetic complications PONV    Cardiovascular  Negative cardio ROS    Pulmonary  Shortness of breath, Sleep apnea Sleep Study completed,        GI/Hepatic  Negative GI/hepatic ROS          Negative  ROS        Endo/Other    Obesity  morbid obesity   GYN  Negative gynecology ROS          Hematology  Negative hematology ROS      Musculoskeletal  Negative musculoskeletal ROS        Neurology  Negative neurology ROS      Psychology   Depression , being treated for depression,              Physical Exam    Airway    Mallampati score: II  TM Distance: >3 FB  Neck ROM: full     Dental   No notable dental hx     Cardiovascular  Comment: Negative ROS, Rhythm: regular, Rate: normal, Cardiovascular exam normal    Pulmonary  Pulmonary exam normal Breath sounds clear to auscultation,     Other Findings        Anesthesia Plan  ASA Score- 3     Anesthesia Type- general with ASA Monitors  Additional Monitors:   Airway Plan: ETT  Comment: SCOP patch ordered  Plan Factors-Patient not instructed to abstain from smoking on day of procedure  Patient did not smoke on day of surgery  Induction- intravenous  Postoperative Plan- Plan for postoperative opioid use  Informed Consent- Anesthetic plan and risks discussed with patient

## 2018-04-26 ENCOUNTER — OFFICE VISIT (OUTPATIENT)
Dept: BARIATRICS | Facility: CLINIC | Age: 43
End: 2018-04-26
Payer: COMMERCIAL

## 2018-04-26 VITALS
RESPIRATION RATE: 18 BRPM | TEMPERATURE: 98.2 F | DIASTOLIC BLOOD PRESSURE: 70 MMHG | SYSTOLIC BLOOD PRESSURE: 124 MMHG | BODY MASS INDEX: 41.55 KG/M2 | HEART RATE: 80 BPM | WEIGHT: 234.5 LBS | HEIGHT: 63 IN

## 2018-04-26 DIAGNOSIS — E66.01 MORBID (SEVERE) OBESITY DUE TO EXCESS CALORIES (HCC): Primary | ICD-10-CM

## 2018-04-26 PROCEDURE — 99213 OFFICE O/P EST LOW 20 MIN: CPT | Performed by: SURGERY

## 2018-04-26 RX ORDER — OMEPRAZOLE 20 MG/1
20 CAPSULE, DELAYED RELEASE ORAL DAILY
Qty: 90 CAPSULE | Refills: 3 | Status: SHIPPED | OUTPATIENT
Start: 2018-04-26 | End: 2019-05-29 | Stop reason: ALTCHOICE

## 2018-04-26 RX ORDER — OXYCODONE HYDROCHLORIDE AND ACETAMINOPHEN 5; 325 MG/1; MG/1
1 TABLET ORAL EVERY 4 HOURS PRN
Qty: 30 TABLET | Refills: 0 | Status: SHIPPED | OUTPATIENT
Start: 2018-04-26 | End: 2018-09-12 | Stop reason: ALTCHOICE

## 2018-04-26 NOTE — PROGRESS NOTES
BARIATRIC HISTORY AND PHYSICAL - BARIATRIC SURGERY  Iwona Santoro 38 y o  female MRN: [id number]  Unit/Bed#:  Encounter: [id number]      HPI:  Iwona Santoro is a 38 y o  female who presents to review her preoperative workup and see if she is a good candidate to undergo a bariatric procedure    Review of Systems   Constitutional: Negative  HENT: Negative  Eyes: Negative  Respiratory: Negative  Cardiovascular: Negative  Gastrointestinal: Negative  Endocrine: Negative  Genitourinary: Negative  Musculoskeletal: Negative  Skin: Negative  Neurological: Negative  Hematological: Negative  Psychiatric/Behavioral: Negative  Historical Information   Past Medical History:   Diagnosis Date    CPAP (continuous positive airway pressure) dependence     Depression     History of anxiety     Left knee pain     "occas"    Low back pain     "mayra if standing for a while"    Morbid obesity (HCC)     Motion sickness     PONV (postoperative nausea and vomiting)     Prediabetes     Right ankle pain     " occas"    Shortness of breath     "exertional"    Sleep apnea     Mild    Walks frequently     for exercise    Wears glasses      Past Surgical History:   Procedure Laterality Date    ANKLE SURGERY      right with screws and plate implant    ANTERIOR CRUCIATE LIGAMENT REPAIR Left     screws implanted    KNEE CARTILAGE SURGERY      right    [place] EGD TRANSORAL BIOPSY SINGLE/MULTIPLE N/A 3/21/2018    Procedure: ESOPHAGOGASTRODUODENOSCOPY (EGD) with bx;  Surgeon: Raven Greer MD;  Location: [facility];   Service: Bariatrics    REDUCTION MAMMAPLASTY      WISDOM TOOTH EXTRACTION       Social History   History   Alcohol Use    Yes     Comment: social     History   Drug Use No     History   Smoking Status    Never Smoker   Smokeless Tobacco    Never Used     Family History: non-contributory    Meds/Allergies   all medications and allergies reviewed  No Known Allergies    Objective     Current Vitals:   Blood Pressure: 124/70 (04/26/18 1151)  Pulse: 80 (04/26/18 1151)  Temperature: 98 2 °F (36 8 °C) (04/26/18 1151)  Respirations: 18 (04/26/18 1151)  Height: 5' 3" (160 cm) (04/26/18 1151)  Weight - Scale: 106 kg (234 lb 8 oz) (04/26/18 1151)  bowel movement  [unfilled]    Invasive Devices          No matching active lines, drains, or airways          Physical Exam   Constitutional: She is oriented to person, place, and time  She appears well-developed  HENT:   Head: Normocephalic and atraumatic  Eyes: Conjunctivae and EOM are normal    Neck: Normal range of motion  Neck supple  Cardiovascular: Normal rate, regular rhythm, normal heart sounds and intact distal pulses  Pulmonary/Chest: Effort normal and breath sounds normal    Abdominal: Soft  Bowel sounds are normal    Musculoskeletal: Normal range of motion  Neurological: She is alert and oriented to person, place, and time  She has normal reflexes  Skin: Skin is warm and dry  Psychiatric: She has a normal mood and affect  Lab Results: I have personally reviewed pertinent lab results  Imaging: I have personally reviewed pertinent reports  EKG, Pathology, and Other Studies: I have personally reviewed pertinent reports  Code Status: [unfilled]  Advance Directive and Living Will:      Power of :    POLST:      Assessment/Plan:      The patient presented to review the preoperative workup and see if bariatric surgery is appropriate and indicated following the extensive preoperative workup and the enrollment in our weight loss program    Preoperative workup was complete  Results were reviewed with the patient including the blood work results and the endoscopy findings and the biopsy results  We also reviewed the cardiology evaluation  I believe that the patient will be a good candidate for laparoscopic sleeve gastrectomy      Patient will need to start the 2 week liquid diet prior to surgery  Risks and benefits explained one more time to the patient  Alternatives to surgery and alternative forms of surgery were also explained  Post-surgical commitment and after care programs were explained  We also discussed that the [employer] robot may be available to us to use on the day of the patient procedure  and that the procedure may be performed robotically  I discussed in details the advantages and disadvantages of this approach including the potential decrease in postoperative pain because of the remote center technology  I also mentioned the lack of strong evidence for  the use of robot in bariatric patients and the potential disadvantage to patients because of the prolonged operative time  Consent was signed  Questions were answered and concerns were addressed  Patient wishes to proceed  As per  [address] [place] [address] guidelines, I had a discussion with the patient regarding his CODE STATUS in the perioperative period and the patient is level 1 or FULL CODE STATUS

## 2018-05-03 ENCOUNTER — TELEPHONE (OUTPATIENT)
Dept: BARIATRICS | Facility: CLINIC | Age: 43
End: 2018-05-03

## 2018-05-04 DIAGNOSIS — E66.01 MORBID (SEVERE) OBESITY DUE TO EXCESS CALORIES (HCC): Primary | ICD-10-CM

## 2018-05-08 ENCOUNTER — HOSPITAL ENCOUNTER (INPATIENT)
Facility: HOSPITAL | Age: 43
LOS: 2 days | Discharge: HOME | DRG: 621 | End: 2018-05-10
Attending: SURGERY | Admitting: SURGERY
Payer: COMMERCIAL

## 2018-05-08 ENCOUNTER — ANESTHESIA (OUTPATIENT)
Dept: PERIOP | Facility: HOSPITAL | Age: 43
DRG: 621 | End: 2018-05-08
Payer: COMMERCIAL

## 2018-05-08 DIAGNOSIS — E66.01 OBESITY, CLASS III, BMI 40-49.9 (MORBID OBESITY) (HCC): ICD-10-CM

## 2018-05-08 LAB — EXT PREGNANCY TEST URINE: NEGATIVE

## 2018-05-08 PROCEDURE — 88307 TISSUE EXAM BY PATHOLOGIST: CPT | Performed by: PATHOLOGY

## 2018-05-08 PROCEDURE — 43775 LAP SLEEVE GASTRECTOMY: CPT | Performed by: PHYSICIAN ASSISTANT

## 2018-05-08 PROCEDURE — 0DB64Z3 EXCISION OF STOMACH, PERCUTANEOUS ENDOSCOPIC APPROACH, VERTICAL: ICD-10-PCS | Performed by: SURGERY

## 2018-05-08 PROCEDURE — 43775 LAP SLEEVE GASTRECTOMY: CPT | Performed by: SURGERY

## 2018-05-08 PROCEDURE — 81025 URINE PREGNANCY TEST: CPT | Performed by: ANESTHESIOLOGY

## 2018-05-08 RX ORDER — HYDROMORPHONE HYDROCHLORIDE 2 MG/ML
INJECTION, SOLUTION INTRAMUSCULAR; INTRAVENOUS; SUBCUTANEOUS AS NEEDED
Status: DISCONTINUED | OUTPATIENT
Start: 2018-05-08 | End: 2018-05-08 | Stop reason: SURG

## 2018-05-08 RX ORDER — MORPHINE SULFATE 4 MG/ML
4 INJECTION, SOLUTION INTRAMUSCULAR; INTRAVENOUS EVERY 2 HOUR PRN
Status: DISCONTINUED | OUTPATIENT
Start: 2018-05-08 | End: 2018-05-10 | Stop reason: HOSPADM

## 2018-05-08 RX ORDER — METOCLOPRAMIDE HYDROCHLORIDE 5 MG/ML
10 INJECTION INTRAMUSCULAR; INTRAVENOUS EVERY 6 HOURS SCHEDULED
Status: DISCONTINUED | OUTPATIENT
Start: 2018-05-08 | End: 2018-05-10 | Stop reason: HOSPADM

## 2018-05-08 RX ORDER — ACETAMINOPHEN 160 MG/5ML
325 SUSPENSION, ORAL (FINAL DOSE FORM) ORAL EVERY 4 HOURS PRN
Status: DISCONTINUED | OUTPATIENT
Start: 2018-05-08 | End: 2018-05-10 | Stop reason: HOSPADM

## 2018-05-08 RX ORDER — ONDANSETRON 2 MG/ML
INJECTION INTRAMUSCULAR; INTRAVENOUS AS NEEDED
Status: DISCONTINUED | OUTPATIENT
Start: 2018-05-08 | End: 2018-05-08 | Stop reason: SURG

## 2018-05-08 RX ORDER — DIPHENHYDRAMINE HYDROCHLORIDE 50 MG/ML
INJECTION INTRAMUSCULAR; INTRAVENOUS AS NEEDED
Status: DISCONTINUED | OUTPATIENT
Start: 2018-05-08 | End: 2018-05-08 | Stop reason: SURG

## 2018-05-08 RX ORDER — MIDAZOLAM HYDROCHLORIDE 1 MG/ML
INJECTION INTRAMUSCULAR; INTRAVENOUS AS NEEDED
Status: DISCONTINUED | OUTPATIENT
Start: 2018-05-08 | End: 2018-05-08 | Stop reason: SURG

## 2018-05-08 RX ORDER — OXYCODONE HCL 5 MG/5 ML
5 SOLUTION, ORAL ORAL EVERY 4 HOURS PRN
Status: DISCONTINUED | OUTPATIENT
Start: 2018-05-08 | End: 2018-05-10 | Stop reason: HOSPADM

## 2018-05-08 RX ORDER — PROPOFOL 10 MG/ML
INJECTION, EMULSION INTRAVENOUS AS NEEDED
Status: DISCONTINUED | OUTPATIENT
Start: 2018-05-08 | End: 2018-05-08 | Stop reason: SURG

## 2018-05-08 RX ORDER — DEXAMETHASONE SODIUM PHOSPHATE 4 MG/ML
INJECTION, SOLUTION INTRA-ARTICULAR; INTRALESIONAL; INTRAMUSCULAR; INTRAVENOUS; SOFT TISSUE AS NEEDED
Status: DISCONTINUED | OUTPATIENT
Start: 2018-05-08 | End: 2018-05-08 | Stop reason: SURG

## 2018-05-08 RX ORDER — ONDANSETRON 2 MG/ML
4 INJECTION INTRAMUSCULAR; INTRAVENOUS ONCE AS NEEDED
Status: DISCONTINUED | OUTPATIENT
Start: 2018-05-08 | End: 2018-05-08 | Stop reason: HOSPADM

## 2018-05-08 RX ORDER — OXYCODONE HCL 5 MG/5 ML
10 SOLUTION, ORAL ORAL EVERY 4 HOURS PRN
Status: DISCONTINUED | OUTPATIENT
Start: 2018-05-08 | End: 2018-05-10 | Stop reason: HOSPADM

## 2018-05-08 RX ORDER — MEPERIDINE HYDROCHLORIDE 50 MG/ML
12.5 INJECTION INTRAMUSCULAR; INTRAVENOUS; SUBCUTANEOUS AS NEEDED
Status: DISCONTINUED | OUTPATIENT
Start: 2018-05-08 | End: 2018-05-08 | Stop reason: HOSPADM

## 2018-05-08 RX ORDER — GLYCOPYRROLATE 0.2 MG/ML
INJECTION INTRAMUSCULAR; INTRAVENOUS AS NEEDED
Status: DISCONTINUED | OUTPATIENT
Start: 2018-05-08 | End: 2018-05-08 | Stop reason: SURG

## 2018-05-08 RX ORDER — LABETALOL HYDROCHLORIDE 5 MG/ML
INJECTION, SOLUTION INTRAVENOUS AS NEEDED
Status: DISCONTINUED | OUTPATIENT
Start: 2018-05-08 | End: 2018-05-08 | Stop reason: SURG

## 2018-05-08 RX ORDER — FENTANYL CITRATE 50 UG/ML
INJECTION, SOLUTION INTRAMUSCULAR; INTRAVENOUS AS NEEDED
Status: DISCONTINUED | OUTPATIENT
Start: 2018-05-08 | End: 2018-05-08 | Stop reason: SURG

## 2018-05-08 RX ORDER — FENTANYL CITRATE/PF 50 MCG/ML
25 SYRINGE (ML) INJECTION
Status: DISCONTINUED | OUTPATIENT
Start: 2018-05-08 | End: 2018-05-08 | Stop reason: HOSPADM

## 2018-05-08 RX ORDER — ONDANSETRON 2 MG/ML
4 INJECTION INTRAMUSCULAR; INTRAVENOUS EVERY 4 HOURS PRN
Status: DISCONTINUED | OUTPATIENT
Start: 2018-05-08 | End: 2018-05-10 | Stop reason: HOSPADM

## 2018-05-08 RX ORDER — ACETAMINOPHEN 160 MG/5ML
320 SUSPENSION, ORAL (FINAL DOSE FORM) ORAL EVERY 4 HOURS PRN
Status: DISCONTINUED | OUTPATIENT
Start: 2018-05-08 | End: 2018-05-10 | Stop reason: HOSPADM

## 2018-05-08 RX ORDER — SODIUM CHLORIDE, SODIUM LACTATE, POTASSIUM CHLORIDE, CALCIUM CHLORIDE 600; 310; 30; 20 MG/100ML; MG/100ML; MG/100ML; MG/100ML
50 INJECTION, SOLUTION INTRAVENOUS CONTINUOUS
Status: DISCONTINUED | OUTPATIENT
Start: 2018-05-08 | End: 2018-05-10

## 2018-05-08 RX ORDER — ROCURONIUM BROMIDE 10 MG/ML
INJECTION, SOLUTION INTRAVENOUS AS NEEDED
Status: DISCONTINUED | OUTPATIENT
Start: 2018-05-08 | End: 2018-05-08 | Stop reason: SURG

## 2018-05-08 RX ORDER — BUPIVACAINE HYDROCHLORIDE AND EPINEPHRINE 5; 5 MG/ML; UG/ML
INJECTION, SOLUTION PERINEURAL AS NEEDED
Status: DISCONTINUED | OUTPATIENT
Start: 2018-05-08 | End: 2018-05-08 | Stop reason: HOSPADM

## 2018-05-08 RX ORDER — SODIUM CHLORIDE 9 MG/ML
125 INJECTION, SOLUTION INTRAVENOUS CONTINUOUS
Status: DISCONTINUED | OUTPATIENT
Start: 2018-05-08 | End: 2018-05-09

## 2018-05-08 RX ORDER — SCOLOPAMINE TRANSDERMAL SYSTEM 1 MG/1
1 PATCH, EXTENDED RELEASE TRANSDERMAL ONCE AS NEEDED
Status: DISCONTINUED | OUTPATIENT
Start: 2018-05-08 | End: 2018-05-08

## 2018-05-08 RX ORDER — MORPHINE SULFATE 2 MG/ML
2 INJECTION, SOLUTION INTRAMUSCULAR; INTRAVENOUS EVERY 2 HOUR PRN
Status: DISCONTINUED | OUTPATIENT
Start: 2018-05-08 | End: 2018-05-10 | Stop reason: HOSPADM

## 2018-05-08 RX ORDER — PANTOPRAZOLE SODIUM 40 MG/1
40 INJECTION, POWDER, FOR SOLUTION INTRAVENOUS
Status: DISCONTINUED | OUTPATIENT
Start: 2018-05-09 | End: 2018-05-10 | Stop reason: HOSPADM

## 2018-05-08 RX ORDER — PROMETHAZINE HYDROCHLORIDE 25 MG/ML
25 INJECTION, SOLUTION INTRAMUSCULAR; INTRAVENOUS EVERY 6 HOURS PRN
Status: DISCONTINUED | OUTPATIENT
Start: 2018-05-08 | End: 2018-05-10 | Stop reason: HOSPADM

## 2018-05-08 RX ADMIN — MORPHINE SULFATE 4 MG: 4 INJECTION, SOLUTION INTRAMUSCULAR; INTRAVENOUS at 15:21

## 2018-05-08 RX ADMIN — ROCURONIUM BROMIDE 10 MG: 10 INJECTION INTRAVENOUS at 12:17

## 2018-05-08 RX ADMIN — OXYCODONE HYDROCHLORIDE 10 MG: 5 SOLUTION ORAL at 19:40

## 2018-05-08 RX ADMIN — LIDOCAINE HYDROCHLORIDE 100 MG: 20 INJECTION, SOLUTION INTRAVENOUS at 11:37

## 2018-05-08 RX ADMIN — DEXAMETHASONE SODIUM PHOSPHATE 4 MG: 4 INJECTION, SOLUTION INTRAMUSCULAR; INTRAVENOUS at 11:41

## 2018-05-08 RX ADMIN — ROCURONIUM BROMIDE 50 MG: 10 INJECTION INTRAVENOUS at 11:37

## 2018-05-08 RX ADMIN — Medication 2000 MG: at 11:45

## 2018-05-08 RX ADMIN — SODIUM CHLORIDE, SODIUM LACTATE, POTASSIUM CHLORIDE, AND CALCIUM CHLORIDE 125 ML/HR: .6; .31; .03; .02 INJECTION, SOLUTION INTRAVENOUS at 14:46

## 2018-05-08 RX ADMIN — METOCLOPRAMIDE 10 MG: 5 INJECTION, SOLUTION INTRAMUSCULAR; INTRAVENOUS at 19:39

## 2018-05-08 RX ADMIN — FENTANYL CITRATE 25 MCG: 50 INJECTION, SOLUTION INTRAMUSCULAR; INTRAVENOUS at 13:59

## 2018-05-08 RX ADMIN — ONDANSETRON HYDROCHLORIDE 4 MG: 2 INJECTION, SOLUTION INTRAVENOUS at 11:27

## 2018-05-08 RX ADMIN — SODIUM CHLORIDE 125 ML/HR: 0.9 INJECTION, SOLUTION INTRAVENOUS at 09:36

## 2018-05-08 RX ADMIN — SCOPALAMINE 1 PATCH: 1 PATCH, EXTENDED RELEASE TRANSDERMAL at 09:37

## 2018-05-08 RX ADMIN — LABETALOL HYDROCHLORIDE 5 MG: 5 INJECTION, SOLUTION INTRAVENOUS at 12:37

## 2018-05-08 RX ADMIN — SODIUM CHLORIDE: 0.9 INJECTION, SOLUTION INTRAVENOUS at 11:49

## 2018-05-08 RX ADMIN — FENTANYL CITRATE 50 MCG: 50 INJECTION, SOLUTION INTRAMUSCULAR; INTRAVENOUS at 12:04

## 2018-05-08 RX ADMIN — NEOSTIGMINE METHYLSULFATE 4 MG: 1 INJECTION, SOLUTION INTRAMUSCULAR; INTRAVENOUS; SUBCUTANEOUS at 13:06

## 2018-05-08 RX ADMIN — LABETALOL HYDROCHLORIDE 2.5 MG: 5 INJECTION, SOLUTION INTRAVENOUS at 12:13

## 2018-05-08 RX ADMIN — GLYCOPYRROLATE 0.8 MG: 0.2 INJECTION, SOLUTION INTRAMUSCULAR; INTRAVENOUS at 13:06

## 2018-05-08 RX ADMIN — FENTANYL CITRATE 25 MCG: 50 INJECTION, SOLUTION INTRAMUSCULAR; INTRAVENOUS at 13:53

## 2018-05-08 RX ADMIN — DIPHENHYDRAMINE HYDROCHLORIDE 12.5 MG: 50 INJECTION INTRAMUSCULAR; INTRAVENOUS at 11:52

## 2018-05-08 RX ADMIN — MIDAZOLAM 2 MG: 1 INJECTION INTRAMUSCULAR; INTRAVENOUS at 11:27

## 2018-05-08 RX ADMIN — PROPOFOL 200 MG: 10 INJECTION, EMULSION INTRAVENOUS at 11:37

## 2018-05-08 RX ADMIN — SODIUM CHLORIDE, SODIUM LACTATE, POTASSIUM CHLORIDE, AND CALCIUM CHLORIDE 125 ML/HR: .6; .31; .03; .02 INJECTION, SOLUTION INTRAVENOUS at 23:21

## 2018-05-08 RX ADMIN — FENTANYL CITRATE 100 MCG: 50 INJECTION, SOLUTION INTRAMUSCULAR; INTRAVENOUS at 11:37

## 2018-05-08 RX ADMIN — ENOXAPARIN SODIUM 40 MG: 40 INJECTION SUBCUTANEOUS at 10:13

## 2018-05-08 RX ADMIN — HYDROMORPHONE HYDROCHLORIDE 0.5 MG: 1 INJECTION, SOLUTION INTRAMUSCULAR; INTRAVENOUS; SUBCUTANEOUS at 14:37

## 2018-05-08 RX ADMIN — HYDROMORPHONE HYDROCHLORIDE 0.5 MG: 2 INJECTION, SOLUTION INTRAMUSCULAR; INTRAVENOUS; SUBCUTANEOUS at 13:16

## 2018-05-08 RX ADMIN — HYDROMORPHONE HYDROCHLORIDE 0.5 MG: 2 INJECTION, SOLUTION INTRAMUSCULAR; INTRAVENOUS; SUBCUTANEOUS at 12:22

## 2018-05-08 RX ADMIN — FENTANYL CITRATE 25 MCG: 50 INJECTION, SOLUTION INTRAMUSCULAR; INTRAVENOUS at 14:04

## 2018-05-08 RX ADMIN — ACETAMINOPHEN 325 MG: 160 SUSPENSION ORAL at 19:40

## 2018-05-08 RX ADMIN — FENTANYL CITRATE 25 MCG: 50 INJECTION, SOLUTION INTRAMUSCULAR; INTRAVENOUS at 13:49

## 2018-05-08 RX ADMIN — SODIUM CHLORIDE: 0.9 INJECTION, SOLUTION INTRAVENOUS at 12:54

## 2018-05-08 RX ADMIN — METOCLOPRAMIDE 10 MG: 5 INJECTION, SOLUTION INTRAMUSCULAR; INTRAVENOUS at 23:21

## 2018-05-08 RX ADMIN — ONDANSETRON 4 MG: 2 INJECTION INTRAMUSCULAR; INTRAVENOUS at 17:08

## 2018-05-08 RX ADMIN — HYDROMORPHONE HYDROCHLORIDE 0.5 MG: 1 INJECTION, SOLUTION INTRAMUSCULAR; INTRAVENOUS; SUBCUTANEOUS at 14:23

## 2018-05-08 RX ADMIN — FENTANYL CITRATE 50 MCG: 50 INJECTION, SOLUTION INTRAMUSCULAR; INTRAVENOUS at 11:59

## 2018-05-08 RX ADMIN — HYDROMORPHONE HYDROCHLORIDE 0.5 MG: 1 INJECTION, SOLUTION INTRAMUSCULAR; INTRAVENOUS; SUBCUTANEOUS at 14:10

## 2018-05-08 NOTE — ANESTHESIA POSTPROCEDURE EVALUATION
Post-Op Assessment Note      CV Status:  Stable    Mental Status:  Alert and awake    Hydration Status:  Euvolemic    PONV Controlled:  Controlled    Airway Patency:  Patent  Airway: intubated    Post Op Vitals Reviewed: Yes          Staff: Anesthesiologist, CRNA           BP      Temp      Pulse     Resp      SpO2

## 2018-05-08 NOTE — H&P (VIEW-ONLY)
BARIATRIC HISTORY AND PHYSICAL - BARIATRIC SURGERY  Stephany Connor 38 y o  female MRN: [id number]  Unit/Bed#:  Encounter: [id number]      HPI:  Stephany Connor is a 38 y o  female who presents to review her preoperative workup and see if she is a good candidate to undergo a bariatric procedure    Review of Systems   Constitutional: Negative  HENT: Negative  Eyes: Negative  Respiratory: Negative  Cardiovascular: Negative  Gastrointestinal: Negative  Endocrine: Negative  Genitourinary: Negative  Musculoskeletal: Negative  Skin: Negative  Neurological: Negative  Hematological: Negative  Psychiatric/Behavioral: Negative  Historical Information   Past Medical History:   Diagnosis Date    CPAP (continuous positive airway pressure) dependence     Depression     History of anxiety     Left knee pain     "occas"    Low back pain     "mayra if standing for a while"    Morbid obesity (HCC)     Motion sickness     PONV (postoperative nausea and vomiting)     Prediabetes     Right ankle pain     " occas"    Shortness of breath     "exertional"    Sleep apnea     Mild    Walks frequently     for exercise    Wears glasses      Past Surgical History:   Procedure Laterality Date    ANKLE SURGERY      right with screws and plate implant    ANTERIOR CRUCIATE LIGAMENT REPAIR Left     screws implanted    KNEE CARTILAGE SURGERY      right    [place] E[place] TRANSORAL BIOPSY SINGLE/MULTIPLE N/A 3/21/2018    Procedure: ESOPHAGOGASTRODUODENOSCOPY (EGD) with bx;  Surgeon: Cinthia Sapp MD;  Location: [facility];   Service: Bariatrics    REDUCTION MAMMAPLASTY      WISDOM TOOTH EXTRACTION       Social History   History   Alcohol Use    Yes     Comment: social     History   Drug Use No     History   Smoking Status    Never Smoker   Smokeless Tobacco    Never Used     Family History: non-contributory    Meds/Allergies   all medications and allergies reviewed  No Known Allergies    Objective     Current Vitals:   Blood Pressure: 124/70 (04/26/18 1151)  Pulse: 80 (04/26/18 1151)  Temperature: 98 2 °F (36 8 °C) (04/26/18 1151)  Respirations: 18 (04/26/18 1151)  Height: 5' 3" (160 cm) (04/26/18 1151)  Weight - Scale: 106 kg (234 lb 8 oz) (04/26/18 1151)  bowel movement  [unfilled]    Invasive Devices          No matching active lines, drains, or airways          Physical Exam   Constitutional: She is oriented to person, place, and time  She appears well-developed  HENT:   Head: Normocephalic and atraumatic  Eyes: Conjunctivae and EOM are normal    Neck: Normal range of motion  Neck supple  Cardiovascular: Normal rate, regular rhythm, normal heart sounds and intact distal pulses  Pulmonary/Chest: Effort normal and breath sounds normal    Abdominal: Soft  Bowel sounds are normal    Musculoskeletal: Normal range of motion  Neurological: She is alert and oriented to person, place, and time  She has normal reflexes  Skin: Skin is warm and dry  Psychiatric: She has a normal mood and affect  Lab Results: I have personally reviewed pertinent lab results  Imaging: I have personally reviewed pertinent reports  EKG, Pathology, and Other Studies: I have personally reviewed pertinent reports  Code Status: [unfilled]  Advance Directive and Living Will:      Power of :    POLST:      Assessment/Plan:      The patient presented to review the preoperative workup and see if bariatric surgery is appropriate and indicated following the extensive preoperative workup and the enrollment in our weight loss program    Preoperative workup was complete  Results were reviewed with the patient including the blood work results and the endoscopy findings and the biopsy results  We also reviewed the cardiology evaluation  I believe that the patient will be a good candidate for laparoscopic sleeve gastrectomy      Patient will need to start the 2 week liquid diet prior to surgery  Risks and benefits explained one more time to the patient  Alternatives to surgery and alternative forms of surgery were also explained  Post-surgical commitment and after care programs were explained  We also discussed that the [employer] robot may be available to us to use on the day of the patient procedure  and that the procedure may be performed robotically  I discussed in details the advantages and disadvantages of this approach including the potential decrease in postoperative pain because of the remote center technology  I also mentioned the lack of strong evidence for  the use of robot in bariatric patients and the potential disadvantage to patients because of the prolonged operative time  Consent was signed  Questions were answered and concerns were addressed  Patient wishes to proceed  As per  [facility] guidelines, I had a discussion with the patient regarding his CODE STATUS in the perioperative period and the patient is level 1 or FULL CODE STATUS

## 2018-05-08 NOTE — PLAN OF CARE
Problem: PAIN - ADULT  Goal: Verbalizes/displays adequate comfort level or baseline comfort level  Interventions:  - Encourage patient to monitor pain and request assistance  - Assess pain using appropriate pain scale  - Administer analgesics based on type and severity of pain and evaluate response  - Implement non-pharmacological measures as appropriate and evaluate response  - Consider cultural and social influences on pain and pain management  - Notify physician/advanced practitioner if interventions unsuccessful or patient reports new pain  Outcome: Progressing      Problem: INFECTION - ADULT  Goal: Absence or prevention of progression during hospitalization  INTERVENTIONS:  - Assess and monitor for signs and symptoms of infection  - Monitor lab/diagnostic results  - Monitor all insertion sites, i e  indwelling lines, tubes, and drains  - Monitor endotracheal (as able) and nasal secretions for changes in amount and color  - [place] appropriate cooling/warming therapies per order  - Administer medications as ordered  - Instruct and encourage patient and family to use good hand hygiene technique  - Identify and instruct in appropriate isolation precautions for identified infection/condition  Outcome: Progressing      Problem: DISCHARGE PLANNING  Goal: Discharge to home or other facility with appropriate resources  INTERVENTIONS:  - Identify barriers to discharge w/patient and caregiver  - Arrange for needed discharge resources and transportation as appropriate  - Identify discharge learning needs (meds, wound care, etc )  - Arrange for interpretive services to assist at discharge as needed  - Refer to Case Management Department for coordinating discharge planning if the patient needs post-hospital services based on physician/advanced practitioner order or complex needs related to functional status, cognitive ability, or social support system  Outcome: Progressing      Problem: RESPIRATORY - ADULT  Goal: Achieves optimal ventilation and oxygenation  INTERVENTIONS:  - Assess for changes in respiratory status  - Assess for changes in mentation and behavior  - Position to facilitate oxygenation and minimize respiratory effort  - Oxygen administration by appropriate delivery method based on oxygen saturation (per order) or ABGs  - Initiate smoking cessation education as indicated  - Encourage broncho-pulmonary hygiene including cough, deep breathe, Incentive Spirometry  - Assess the need for suctioning and aspirate as needed  - Assess and instruct to report SOB or any respiratory difficulty  - Respiratory Therapy support as indicated  Outcome: Progressing      Problem: GENITOURINARY - ADULT  Goal: Absence of urinary retention  INTERVENTIONS:  - Assess patients ability to void and empty bladder  - Monitor I/O  - Bladder scan as needed  - Discuss with physician/AP medications to alleviate retention as needed  - Discuss catheterization for long term situations as appropriate  Outcome: Progressing      Problem: SKIN/TISSUE INTEGRITY - ADULT  Goal: Incision(s), wounds(s) or drain site(s) healing without S/S of infection  INTERVENTIONS  - Assess and document risk factors for skin impairment   - Assess and document dressing, incision, wound bed, drain sites and surrounding tissue  - Initiate Nutrition services consult and/or wound management as needed  Outcome: Progressing

## 2018-05-08 NOTE — OP NOTE
OPERATIVE REPORT  PATIENT NAME: Kasie Menjivar    :  1975  MRN: [id number]  Pt Location: [facility] OR ROOM 06    SURGERY DATE: 2018    Surgeon(s) and Role:     * Kortney Freeman PA-C - Rahul Lagos MD - Primary    Preop Diagnosis:  Obesity, Class III, BMI 40-49 9 (morbid obesity) ([facility] ) [E66 01]    Post-Op Diagnosis Codes:     * Obesity, Class III, BMI 40-49 9 (morbid obesity) ([facility] ) [E66 01]    Procedure(s) (LRB):  LAPAROSCOPIC SLEEVE GASTRECTOMY WITH ROBOTICS (N/A)    Specimen(s):  ID Type Source Tests Collected by Time Destination   1 : PORTION OF STOMACH Tissue Stomach TISSUE EXAM Timo Mckenzie MD 2018 1257        Estimated Blood Loss:   Minimal    Drains:  [REMOVED] NG/OG/Enteral Tube Orogastric Center mouth (Removed)   Number of days: 0       Anesthesia Type:   General    Operative Indications:  Obesity, Class III, BMI 40-49 9 (morbid obesity) ([facility] ) [E66 01]      Operative Findings:  Normal Findings    Complications:   None       I was present for the entire procedure    Patient Disposition:  hemodynamically stable     Indication:   Patient suffers from morbid obesity and associated co-morbidities  and failed to achieve any meaningful or sustainable weight loss and was therefore consented to undergo a laparoscopic sleeve gastrectomy  Risks and benefits were explained to the patient, patient consented for the procedure  Description of the procedure: The patient was brought to the operating room and placed in a supine position  The patient received a dose of IV antibiotics and a dose of subcutaneous Lovenox prior to the procedure  The patient was induced under general endotracheal anesthesia  The abdominal wall was prepped and draped under sterile conditions in the usual fashion   The procedure was started by obtaining access to the abdominal cavity using a Veress needle to the left side of the midline around 6 inches from the xiphoid the abdominal cavity was insufflated with CO2 to a pressure of 15 mmHg  After that, the abdomen was entered with a 12 mm trocar using an Optiview trocar under direct visualization  At that point, an 8-mm trocar and a 15-mm trocar were placed on the right side of the abdominal wall, under direct visualization, and two 8-mm trocars were placed on the left side of the abdominal wall, also under direct visualization  The patient was then placed in a reverse Trendelenburg position  A Bambi retractor was placed through a small stab incision below the xiphoid and was used to retract the left lobe of the liver in a medial fashion  The robot was then docked and the arms locked in place  An OG tube was placed to decompress the stomach and then removed immediately  The angle of His was then dissected using the harmonic scalpel    At that point, we turned our attention to the pylorus and using the vessel sealerl, the gastrocolic ligament was taken down starting 4 cm proximal to the pylorus, all the way up to the level of the short gastric vessels which were taken down with the vessel sealer  as well  The angle of His was also dissected and all the posterior attachments of the fundus were taken down with the vessel sealer and with sharp dissection, the spleen was kept out of harms way and the left nicole was exposed and the stomach was completely mobilized off the left nicole and rotated medially  There were some posterior attachments to the posterior wall of the stomach and those were taken down sharply with laparoscopic scissors  At that point, the anesthesiologist was asked to insert a 36-[place] Bougie  The Bougie was secured in place by the anesthesiologist      We started transecting the stomach using a black cartridge  The first 2 firings consisted of black cartridge loads  The third firing was a purple load, and the rest of the firings consisted of purple loads as well    The 36-[place] Bougie was kept in place throughout the performance of the sleeve gastrectomy  We made particular attention during the transaction of the greater curvature to retract the stomach laterally at the site of the transected vessels  to prevent corkscrewing of the gastric sleeve  We also avoided getting too close to the incisura to prevent  narrowing at the level of the incisura  The staple line was hemostatic and well formed and there was no evidence of narrowing at the incisura  The staple line was then imbricated using 2-0 V LOC suture in a running fashion while the bougie is in place  At the end, the anesthesiologist was asked to remove the [address]  The surgical field was inspected one more time and appeared hemostatic  We then removed the [facility] liver retractor  The midline camera port was extended, and then it was dilated  After that, the stomach specimen was retrieved and sent to Pathology  Sponge count was completed and was correct  The camera port was then closed using #1 Vicryl in a simple fashion  The 15 mm trocar incision was also closed with #1 Vicryl in a simple fashion  All the trocars were removed under direct visualization, and the skin edges were approximated using 4-0 Monocryl in an interrupted, inverted fashion  Histoacryl was then applied to the skin incisions  The patient was extubated and transferred to the PACU in stable condition       SIGNATURE: Jessy Rockwell MD  DATE: May 8, 2018  TIME: 1:04 PM

## 2018-05-09 LAB
ANION GAP SERPL CALCULATED.3IONS-SCNC: 7 MMOL/L (ref 4–13)
BUN SERPL-MCNC: 9 MG/DL (ref 5–25)
CALCIUM SERPL-MCNC: 8.4 MG/DL (ref 8.3–10.1)
CHLORIDE SERPL-SCNC: 104 MMOL/L (ref 100–108)
CO2 SERPL-SCNC: 25 MMOL/L (ref 21–32)
CREAT SERPL-MCNC: 0.78 MG/DL (ref 0.6–1.3)
ERYTHROCYTE [DISTWIDTH] IN BLOOD BY AUTOMATED COUNT: 15.3 % (ref 11.6–15.1)
GFR SERPL CREATININE-BSD FRML MDRD: 95 ML/MIN/1.73SQ M
GLUCOSE SERPL-MCNC: 112 MG/DL (ref 65–140)
HCT VFR BLD AUTO: 30.5 % (ref 34.8–46.1)
HCT VFR BLD AUTO: 32.1 % (ref 34.8–46.1)
HGB BLD-MCNC: 10.3 G/DL (ref 11.5–15.4)
HGB BLD-MCNC: 9.7 G/DL (ref 11.5–15.4)
MCH RBC QN AUTO: 24.4 PG (ref 26.8–34.3)
MCHC RBC AUTO-ENTMCNC: 31.8 G/DL (ref 31.4–37.4)
MCV RBC AUTO: 77 FL (ref 82–98)
PLATELET # BLD AUTO: 304 THOUSANDS/UL (ref 149–390)
PMV BLD AUTO: 10.3 FL (ref 8.9–12.7)
POTASSIUM SERPL-SCNC: 3.7 MMOL/L (ref 3.5–5.3)
RBC # BLD AUTO: 3.98 MILLION/UL (ref 3.81–5.12)
SODIUM SERPL-SCNC: 136 MMOL/L (ref 136–145)
WBC # BLD AUTO: 10.9 THOUSAND/UL (ref 4.31–10.16)

## 2018-05-09 PROCEDURE — C9113 INJ PANTOPRAZOLE SODIUM, VIA: HCPCS | Performed by: SURGERY

## 2018-05-09 PROCEDURE — 80048 BASIC METABOLIC PNL TOTAL CA: CPT | Performed by: SURGERY

## 2018-05-09 PROCEDURE — 85027 COMPLETE CBC AUTOMATED: CPT | Performed by: SURGERY

## 2018-05-09 PROCEDURE — 85018 HEMOGLOBIN: CPT | Performed by: SURGERY

## 2018-05-09 PROCEDURE — 85014 HEMATOCRIT: CPT | Performed by: SURGERY

## 2018-05-09 RX ORDER — POTASSIUM CHLORIDE 20MEQ/15ML
20 LIQUID (ML) ORAL ONCE
Status: COMPLETED | OUTPATIENT
Start: 2018-05-09 | End: 2018-05-09

## 2018-05-09 RX ADMIN — METOCLOPRAMIDE 10 MG: 5 INJECTION, SOLUTION INTRAMUSCULAR; INTRAVENOUS at 05:50

## 2018-05-09 RX ADMIN — ACETAMINOPHEN 325 MG: 160 SUSPENSION ORAL at 12:36

## 2018-05-09 RX ADMIN — METOCLOPRAMIDE 10 MG: 5 INJECTION, SOLUTION INTRAMUSCULAR; INTRAVENOUS at 12:36

## 2018-05-09 RX ADMIN — SODIUM CHLORIDE, SODIUM LACTATE, POTASSIUM CHLORIDE, AND CALCIUM CHLORIDE 50 ML/HR: .6; .31; .03; .02 INJECTION, SOLUTION INTRAVENOUS at 19:49

## 2018-05-09 RX ADMIN — ACETAMINOPHEN 325 MG: 160 SUSPENSION ORAL at 05:50

## 2018-05-09 RX ADMIN — OXYCODONE HYDROCHLORIDE 10 MG: 5 SOLUTION ORAL at 05:51

## 2018-05-09 RX ADMIN — METOCLOPRAMIDE 10 MG: 5 INJECTION, SOLUTION INTRAMUSCULAR; INTRAVENOUS at 18:14

## 2018-05-09 RX ADMIN — OXYCODONE HYDROCHLORIDE 10 MG: 5 SOLUTION ORAL at 18:14

## 2018-05-09 RX ADMIN — ACETAMINOPHEN 325 MG: 160 SUSPENSION ORAL at 01:54

## 2018-05-09 RX ADMIN — OXYCODONE HYDROCHLORIDE 10 MG: 5 SOLUTION ORAL at 12:36

## 2018-05-09 RX ADMIN — SODIUM CHLORIDE, SODIUM LACTATE, POTASSIUM CHLORIDE, AND CALCIUM CHLORIDE 125 ML/HR: .6; .31; .03; .02 INJECTION, SOLUTION INTRAVENOUS at 05:55

## 2018-05-09 RX ADMIN — POTASSIUM CHLORIDE 20 MEQ: 20 SOLUTION ORAL at 10:14

## 2018-05-09 RX ADMIN — PANTOPRAZOLE SODIUM 40 MG: 40 INJECTION, POWDER, FOR SOLUTION INTRAVENOUS at 10:14

## 2018-05-09 RX ADMIN — ONDANSETRON 4 MG: 2 INJECTION INTRAMUSCULAR; INTRAVENOUS at 10:24

## 2018-05-09 RX ADMIN — OXYCODONE HYDROCHLORIDE 10 MG: 5 SOLUTION ORAL at 01:54

## 2018-05-09 NOTE — CASE MANAGEMENT
Initial Clinical Review    Age/Sex: 38 y o  female    Surgery Date:     5/8/2018    Procedure:    Preop Diagnosis:  Obesity, Class III, BMI 40-49 9 (morbid obesity) ([facility] ) [E66 01]     Post-Op Diagnosis Codes:     * Obesity, Class III, BMI 40-49 9 (morbid obesity) ([facility] ) [E66 01]     Procedure(s) (LRB):  LAPAROSCOPIC SLEEVE GASTRECTOMY WITH ROBOTICS (N/A)    Anesthesia:    general    Admission Orders: Date/Time/Statement: 5/8/18 @ 1330     Orders Placed This Encounter   Procedures    Inpatient Admission     Standing Status:   Standing     Number of Occurrences:   1     Order Specific Question:   Admitting Physician     Answer:   VERN Mosquera [[id number]]     Order Specific Question:   Level of Care     Answer:   Med Surg [16]     Order Specific Question:   Bed Type     Answer:   Bariatric [1]     Order Specific Question:   Bed request comments     Answer:   Telemetry     Order Specific Question:   Estimated length of stay     Answer:   One Midnight       Vital Signs: /70 (BP Location: Left arm)   Pulse 60   Temp 97 7 °F (36 5 °C) (Temporal)   Resp 18   Ht 5' 3" (1 6 m)   Wt 104 kg (229 lb 11 2 oz)   LMP 05/07/2018   SpO2 99%   BMI 40 69 kg/m²     Diet:        Diet Orders            Start     Ordered    05/08/18 1900  Diet Bariatric; Bariatric Clear Liquid  Diet effective now     Question Answer Comment   Diet Type Bariatric    Bariatric Bariatric Clear Liquid    RD to adjust diet per protocol? Yes        05/08/18 1506          Mobility:    As  suzette    DVT Prophylaxis:   SCD'S    Pain Control:   Pain Medications             buPROPion (WELLBUTRIN XL) 150 mg 24 hr tablet Take 150 mg by mouth 2 (two) times a day          IV  zofran Prn   (  x1   5/9  Thus far)    Thank you,  [facility] in the [facility] [place] for 2017  Network Utilization Review Department  Phone: [phone];  Fax [phone]  ATTENTION: The Network Utilization Review Department is now centralized for our 7 Facilities  Make a note that we have a new phone and fax numbers for our Department  Please call with any questions or concerns to [phone] and carefully follow the prompts so that you are directed to the right person  All voicemails are confidential  Fax any determinations, approvals, denials, and requests for initial or continue stay review clinical to [phone]  Due to HIGH CALL volume, it would be easier if you could please send faxed requests to expedite your requests and in part, help us provide discharge notifications faster

## 2018-05-09 NOTE — POST OP PROGRESS NOTES
Bariatric Surgery Progress Note    Subjective  No adverse events  Advancing PO hydration, ambulation, spirometry  Pain/nausea control adequate      Objective  Vitals:    05/09/18 0739   BP: 127/69   Pulse: (!) 54   Resp: 18   Temp: 98 2 °F (36 8 °C)   SpO2: 98%     NAD, alert  Normal inspiratory effort  Abdomen soft, non-distended, appropriately tender  Incisions c/d/i    Labs  H/H 9 7/30 5  K 3 7    Assessment  42F POD 1 s/p lap/robot sleeve gastrectomy    Plan  - decrease IVF  - stop telemetry/pulse ox  - replete K  - recheck H/H at noon, will give Lovenox if stable  - encourage incentive spirometry, ambulation, clear liquids

## 2018-05-10 VITALS
SYSTOLIC BLOOD PRESSURE: 137 MMHG | HEIGHT: 63 IN | HEART RATE: 65 BPM | BODY MASS INDEX: 40.7 KG/M2 | RESPIRATION RATE: 18 BRPM | WEIGHT: 229.7 LBS | DIASTOLIC BLOOD PRESSURE: 65 MMHG | TEMPERATURE: 98.2 F | OXYGEN SATURATION: 97 %

## 2018-05-10 PROCEDURE — C9113 INJ PANTOPRAZOLE SODIUM, VIA: HCPCS | Performed by: SURGERY

## 2018-05-10 PROCEDURE — 99024 POSTOP FOLLOW-UP VISIT: CPT | Performed by: SURGERY

## 2018-05-10 RX ADMIN — METOCLOPRAMIDE 10 MG: 5 INJECTION, SOLUTION INTRAMUSCULAR; INTRAVENOUS at 00:59

## 2018-05-10 RX ADMIN — ENOXAPARIN SODIUM 40 MG: 40 INJECTION SUBCUTANEOUS at 10:04

## 2018-05-10 RX ADMIN — METOCLOPRAMIDE 10 MG: 5 INJECTION, SOLUTION INTRAMUSCULAR; INTRAVENOUS at 06:36

## 2018-05-10 RX ADMIN — PANTOPRAZOLE SODIUM 40 MG: 40 INJECTION, POWDER, FOR SOLUTION INTRAVENOUS at 10:04

## 2018-05-10 NOTE — DISCHARGE SUMMARY
Discharge Summary - [place] Console 38 y o  female MRN: [id number]    Unit/Bed#: E5 -01 Encounter: [id number]    Admission Date: 5/8/2018     Discharge Date: 05/10/18    Admitting Diagnosis: Obesity, Class III, BMI 40-49 9 (morbid obesity) ([facility] ) [E66 01]    Secondary Diagnosis:   Past Medical History:   Diagnosis Date    CPAP (continuous positive airway pressure) dependence     Depression     History of anxiety     Left knee pain     "occas"    Low back pain     "mayra if standing for a while"    Morbid obesity (HCC)     Motion sickness     PONV (postoperative nausea and vomiting)     Prediabetes     Right ankle pain     " occas"    Shortness of breath     "exertional"    Sleep apnea     Mild    Walks frequently     for exercise    Wears glasses        Discharge Diagnosis: Same    Procedures Performed: Procedure(s):  LAPAROSCOPIC SLEEVE GASTRECTOMY WITH ROBOTICS    Consults: None    Hospital Course: Patient with morbid obesity was admitted to the hospital on 5/8/2018 for elective Procedure(s):  LAPAROSCOPIC SLEEVE GASTRECTOMY WITH ROBOTICS  Postoperatively, the patient was stable and transferred to the [address] for further observation  Postop day one, the patient was having pain and nausea prohibitive of fully tolerating a liquid diet and was kept inpatient for observation  The symptoms had fully resolved by postoperative day 2  The patient was ambulating without assistance, vital signs and lab work were stable  The patient was cleared for discharge on 05/10/18  Disposition: The patient should follow up with No att  providers found in 2 weeks for a post op check and with Glenna Contreras MD as needed for medical management  The patient should refer to the handout "Discharge Instructions" for further information  Call physician for temperature over 101, wound redness or discharge, vomiting, or intolerance to diet        Discharge Medications:  See after visit summary for reconciled discharge medications provided to patient and family

## 2018-05-10 NOTE — POST OP PROGRESS NOTES
Bariatric Surgery Progress Note    Subjective  No adverse events  Advancing PO hydration, ambulation, spirometry  Pain/nausea control adequate      Objective  Vitals:    05/10/18 0747   BP: 137/65   Pulse: 65   Resp: 18   Temp: 98 2 °F (36 8 °C)   SpO2: 97%     NAD, alert  Normal inspiratory effort  Abdomen soft, non-distended, appropriately tender  Incisions c/d/i    Labs  H/H 10 3/32 1    Assessment  42F POD 2 s/p robot/lap sleeve    Plan  - discharge home today  - lovenox okay  - encourage incentive spirometry, ambulation, clear liquids

## 2018-05-10 NOTE — PLAN OF CARE
Problem: PAIN - ADULT  Goal: Verbalizes/displays adequate comfort level or baseline comfort level  Interventions:  - Encourage patient to monitor pain and request assistance  - Assess pain using appropriate pain scale  - Administer analgesics based on type and severity of pain and evaluate response  - Implement non-pharmacological measures as appropriate and evaluate response  - Consider cultural and social influences on pain and pain management  - Notify physician/advanced practitioner if interventions unsuccessful or patient reports new pain   Outcome: Completed Date Met: 05/10/18      Problem: INFECTION - ADULT  Goal: Absence or prevention of progression during hospitalization  INTERVENTIONS:  - Assess and monitor for signs and symptoms of infection  - Monitor lab/diagnostic results  - Monitor all insertion sites, i e  indwelling lines, tubes, and drains  - Monitor endotracheal (as able) and nasal secretions for changes in amount and color  - [place] appropriate cooling/warming therapies per order  - Administer medications as ordered  - Instruct and encourage patient and family to use good hand hygiene technique  - Identify and instruct in appropriate isolation precautions for identified infection/condition   Outcome: Completed Date Met: 05/10/18      Problem: DISCHARGE PLANNING  Goal: Discharge to home or other facility with appropriate resources  INTERVENTIONS:  - Identify barriers to discharge w/patient and caregiver  - Arrange for needed discharge resources and transportation as appropriate  - Identify discharge learning needs (meds, wound care, etc )  - Arrange for interpretive services to assist at discharge as needed  - Refer to Case Management Department for coordinating discharge planning if the patient needs post-hospital services based on physician/advanced practitioner order or complex needs related to functional status, cognitive ability, or social support system   Outcome: Completed Date Met: 05/10/18      Problem: RESPIRATORY - ADULT  Goal: Achieves optimal ventilation and oxygenation  INTERVENTIONS:  - Assess for changes in respiratory status  - Assess for changes in mentation and behavior  - Position to facilitate oxygenation and minimize respiratory effort  - Oxygen administration by appropriate delivery method based on oxygen saturation (per order) or ABGs  - Initiate smoking cessation education as indicated  - Encourage broncho-pulmonary hygiene including cough, deep breathe, Incentive Spirometry  - Assess the need for suctioning and aspirate as needed  - Assess and instruct to report SOB or any respiratory difficulty  - Respiratory Therapy support as indicated   Outcome: Completed Date Met: 05/10/18      Problem: GENITOURINARY - ADULT  Goal: Absence of urinary retention  INTERVENTIONS:  - Assess patients ability to void and empty bladder  - Monitor I/O  - Bladder scan as needed  - Discuss with physician/AP medications to alleviate retention as needed  - Discuss catheterization for long term situations as appropriate   Outcome: Completed Date Met: 05/10/18      Problem: SKIN/TISSUE INTEGRITY - ADULT  Goal: Incision(s), wounds(s) or drain site(s) healing without S/S of infection  INTERVENTIONS  - Assess and document risk factors for skin impairment   - Assess and document dressing, incision, wound bed, drain sites and surrounding tissue  - Initiate Nutrition services consult and/or wound management as needed   Outcome: Completed Date Met: 05/10/18

## 2018-05-11 ENCOUNTER — TRANSITIONAL CARE MANAGEMENT (OUTPATIENT)
Dept: INTERNAL MEDICINE CLINIC | Facility: CLINIC | Age: 43
End: 2018-05-11

## 2018-05-11 ENCOUNTER — TELEPHONE (OUTPATIENT)
Dept: BARIATRICS | Facility: CLINIC | Age: 43
End: 2018-05-11

## 2018-05-11 DIAGNOSIS — F32.A DEPRESSION, UNSPECIFIED DEPRESSION TYPE: Primary | ICD-10-CM

## 2018-05-11 RX ORDER — BUPROPION HYDROCHLORIDE 150 MG/1
150 TABLET ORAL 2 TIMES DAILY
Qty: 60 TABLET | Refills: 5 | Status: SHIPPED | OUTPATIENT
Start: 2018-05-11 | End: 2019-01-17 | Stop reason: SDUPTHER

## 2018-05-11 NOTE — TELEPHONE ENCOUNTER
PT IS SCHEDULED FOR TUES IN BATH WITH BG SHE WAS DISCHARGED FROM [facility] 5/10/18  SHE HAD BARIATRIC SURGERY

## 2018-05-14 ENCOUNTER — TELEPHONE (OUTPATIENT)
Dept: BARIATRICS | Facility: CLINIC | Age: 43
End: 2018-05-14

## 2018-05-14 NOTE — TELEPHONE ENCOUNTER
Post op follow up phone call completed  Pt is sipping liquids but only consuming about 24 oz per day  Instructed pt to increase fluids to avoid dehydration  Pt is also not using IS as instructed, reinforced importance of using IS to help prevent pneumonia  Instructed to do 10x's/hr while awake  Ambulating about home without difficulty  Minimal pain, not using Percocet  Reaffirmed examples of liquid diet over the next day and reminded to advance diet to pureed tomorrow  Pt stated understanding about discharge instructions and medication adjustments  Tolerating self administration of Lovenox injections without difficulty  Follow up appt with surgeon scheduled for the end of this week  Instructed to call with any additional questions or concerns

## 2018-05-15 ENCOUNTER — OFFICE VISIT (OUTPATIENT)
Dept: INTERNAL MEDICINE CLINIC | Age: 43
End: 2018-05-15
Payer: COMMERCIAL

## 2018-05-15 VITALS
WEIGHT: 219.4 LBS | OXYGEN SATURATION: 99 % | HEIGHT: 63 IN | HEART RATE: 97 BPM | TEMPERATURE: 96.6 F | DIASTOLIC BLOOD PRESSURE: 70 MMHG | SYSTOLIC BLOOD PRESSURE: 118 MMHG | BODY MASS INDEX: 38.88 KG/M2

## 2018-05-15 DIAGNOSIS — D64.9 ANEMIA, UNSPECIFIED TYPE: Primary | ICD-10-CM

## 2018-05-15 DIAGNOSIS — Z98.84 STATUS POST LAPAROSCOPIC SLEEVE GASTRECTOMY: ICD-10-CM

## 2018-05-15 PROBLEM — G47.33 OBSTRUCTIVE SLEEP APNEA, ADULT: Status: ACTIVE | Noted: 2017-11-29

## 2018-05-15 PROBLEM — S83.249A TEAR OF MEDIAL MENISCUS OF KNEE: Status: ACTIVE | Noted: 2017-04-17

## 2018-05-15 PROCEDURE — 99496 TRANSJ CARE MGMT HIGH F2F 7D: CPT | Performed by: NURSE PRACTITIONER

## 2018-05-15 NOTE — PROGRESS NOTES
Assessment/Plan:    No problem-specific Assessment & Plan notes found for this encounter  Diagnoses and all orders for this visit:    Anemia, unspecified type  -     CBC and differential; Future  -     Iron Panel; Future    Status post laparoscopic sleeve gastrectomy        The patient's chart was reviewed, and it appears that she has not had a CBC drawn since 2014 at which point her hemoglobin and hematocrit were within normal limits  Will recheck a CBC and iron panel to further assess her anemia  One potential etiology is hemodilution status post-op  The patient has a follow-up with Jean Marie Beckman on Friday,  At which point her results should be in her chart  Should her anemia be iron deficiency anemia, his input on the formulation of on iron supplementation would be appreciated, given her recent surgery  Should the labs reveal another etiology for the patient's anemia, will refer her appropriately to Gastroenterology and/or Hematology as indicated  Subjective:      Patient ID: Jena Whittington is a 38 y o  female  Patient presents for a TCM visit following her discharge from [facility] on May 10th  Status post a gastric sleeve procedure performed by Dr Jacob Daly  The patient reports that she has a follow-up with her surgeon in 3 days  Since her discharge from the hospital, the patient reports that she has been overall feeling well  She reports that she is somewhat fatigued and with lots of walking or movement, she feels that that is "draining"  She reports that her discomfort in her abdomen has improved every day since coming home, and the nausea that was noted in her hospital records has has completely resolved  Upon review of patient's hospital records, it was noted that the patient had initially a hemoglobin of 9 7 and then upon repeat a hemoglobin of 10 3 on May 9th, the day before her discharge home    The patient denies an official diagnosis of anemia prior to the surgery, but does believe that she has been anemic in the past    The patient denies dizziness, or lightheadedness  When asked about dark or tarry bowel movements, the patient reports that she has yet to have a bowel movement since the surgery  She reports that she was told that if she does not have a bowel movement by today, she was to start the protocol including suppositories to initiate a BM as directed by Dr Delilah Russell  Prior to her procedure, the patient denies any dark tarry stools or any blood in her stools  The following portions of the patient's history were reviewed and updated as appropriate: allergies, current medications, past family history, past medical history, past surgical history and problem list     Review of Systems   Constitutional: Positive for fatigue  Negative for chills, diaphoresis and fever  HENT: Negative for congestion, ear pain, sore throat and trouble swallowing  Eyes: Negative for pain  Respiratory: Negative for cough, shortness of breath and wheezing  Cardiovascular: Negative for chest pain and palpitations  Gastrointestinal: Positive for constipation (noted in HPI)  Negative for abdominal distention, abdominal pain, nausea and vomiting  Genitourinary: Negative for dysuria  Musculoskeletal: Negative for gait problem  Skin: Positive for wound (surgical sites from laprascopy with glue intact)  Negative for rash  Neurological: Positive for weakness  Negative for dizziness, syncope and light-headedness  Psychiatric/Behavioral: The patient is not nervous/anxious            Past Medical History:   Diagnosis Date    CPAP (continuous positive airway pressure) dependence     Depression     History of anxiety     Left knee pain     "occas"    Low back pain     "mayra if standing for a while"    Morbid obesity (HCC)     Motion sickness     PONV (postoperative nausea and vomiting)     Prediabetes     Right ankle pain     " occas"    Shortness of breath "exertional"    Sleep apnea     Mild    Walks frequently     for exercise    Wears glasses          Current Outpatient Prescriptions:     buPROPion (WELLBUTRIN XL) 150 mg 24 hr tablet, Take 1 tablet (150 mg total) by mouth 2 (two) times a day, Disp: 60 tablet, Rfl: 5    CALCIUM PO, Take by mouth 2 (two) times a day, Disp: , Rfl:     enoxaparin (LOVENOX) 40 mg/0 4 mL, Inject 0 4 mL (40 mg total) under the skin daily for 13 days, Disp: 5 2 mL, Rfl: 0    Multiple Vitamin (MULTIVITAMIN) tablet, Take 1 tablet by mouth daily, Disp: , Rfl:     omeprazole (PriLOSEC) 20 mg delayed release capsule, Take 1 capsule (20 mg total) by mouth daily, Disp: 90 capsule, Rfl: 3    oxyCODONE-acetaminophen (PERCOCET) 5-325 mg per tablet, Take 1 tablet by mouth every 4 (four) hours as needed for moderate pain Earliest Fill Date: 4/26/18 Max Daily Amount: 6 tablets, Disp: 30 tablet, Rfl: 0    No Known Allergies    Social History   Past Surgical History:   Procedure Laterality Date    ANKLE SURGERY      right with screws and plate implant    ANTERIOR CRUCIATE LIGAMENT REPAIR Left     screws implanted    BARIATRIC SURGERY  05/08/2018    KNEE CARTILAGE SURGERY      right    [place] EGD TRANSORAL BIOPSY SINGLE/MULTIPLE N/A 3/21/2018    Procedure: ESOPHAGOGASTRODUODENOSCOPY (EGD) with bx;  Surgeon: Kimberly Nicholas MD;  [facility];   Service: Bariatrics    [place] LAP, ELIN RESTRICT PROC, LONGITUDINAL GASTRECTOMY N/A 5/8/2018    Procedure: LAPAROSCOPIC SLEEVE GASTRECTOMY WITH ROBOTICS;  Surgeon: Kimberly Nicholas MD;  [facility];  Service: Bariatrics    REDUCTION MAMMAPLASTY      WISDOM TOOTH EXTRACTION       Family History   Problem Relation Age of Onset    Hypertension Mother     Diabetes Mother     Heart attack Mother     Hypertension Father     Diabetes Father        Objective:  /70 (BP Location: Left arm, Patient Position: Sitting, Cuff Size: Adult)   Pulse 97   Temp (!) 96 6 °F (35 9 °C) (Oral)   Ht 5' 3" (1 6 m)   Wt 99 5 kg (219 lb 6 4 oz)   LMP 05/07/2018   SpO2 99%   BMI 38 86 kg/m²     Recent Results (from the past 168 hour(s))   Tissue Exam    Collection Time: 05/08/18 12:57 PM   Result Value Ref Range    Case Report       Surgical Pathology Report                         Case: [id number]                                   Authorizing Provider:  Kimberly Nicholas MD         Collected:           05/08/2018 1257              Ordering Location:     [facility]        Received:            05/08/2018 [address] [place] [address] Operating Room                                                     Pathologist:           Romy Norris MD                                                              Specimen:    Stomach, PORTION OF STOMACH                                                                Final Diagnosis       A  Stomach, Portion of stomach; partial resection:  -Portion of viable gastric wall with few intramucosal lymphoid follicles and mild mucosal hemorrhage   -No evidence of malignancy  Note       Interpretation performed at [address] notes  Post-Op Diagnosis Codes:     * Obesity, Class III, BMI 40-49 9 (morbid obesity) ([facility] ) [E66 01]   Procedure(s) (LRB):  LAPAROSCOPIC SLEEVE GASTRECTOMY WITH ROBOTICS (N/A)         Additional Information       All controls performed with the immunohistochemical stains reported above reacted appropriately  These tests were developed and their performance characteristics determined by [facility] or [facility]  They may not be cleared or approved by the [place]  Food and Drug Administration  The FDA has determined that such clearance or approval is not necessary  These tests are used for clinical purposes  They should not be regarded as investigational or for research   This laboratory has been approved by CLIA 88, designated as a high-complexity laboratory and is qualified to perform these tests  Gross Description       A  The specimen is received in formalin, labeled with the patient's name and hospital number, and is designated "portion of stomach  The specimen consists of a portion of stomach measuring 16 0 x 3 5 x 2 7 cm in greatest dimension  The serosa is tan-pink, smooth and glistening  Attached fatty tissue is scant  A staple line runs the length of 1 side of the specimen and measures 17 0 cm in length  The specimen is opened releasing a scant amount of hemorrhagic mucoid material   The mucosa is tan-pink with typical rugal folds exhibiting focal areas of flat, hemorrhagic foci ranging from 0 2-0 3 cm in greatest dimension  The mucosa also exhibits a single pink-red, firm polypoid focus measuring 0 4 cm in greatest dimension  Representative sections are submitted in 3 cassettes  A1:  Representative section of polypoid focus  A2:  Representative sections of flat hemorrhagic foci x2  A3:  Representative sections of typical rugal folds x2    Note: The estimated total formalin fixation time based upon information provided by the submitting clinician and the standard processing schedule is under 72 hours       Shannan     Basic metabolic panel    Collection Time: 05/09/18  7:03 AM   Result Value Ref Range    Sodium 136 136 - 145 mmol/L    Potassium 3 7 3 5 - 5 3 mmol/L    Chloride 104 100 - 108 mmol/L    CO2 25 21 - 32 mmol/L    Anion Gap 7 4 - 13 mmol/L    BUN 9 5 - 25 mg/dL    Creatinine 0 78 0 60 - 1 30 mg/dL    Glucose 112 65 - 140 mg/dL    Calcium 8 4 8 3 - 10 1 mg/dL    eGFR 95 ml/min/1 73sq m   CBC (With Platelets)    Collection Time: 05/09/18  7:03 AM   Result Value Ref Range    WBC 10 90 (H) 4 31 - 10 16 Thousand/uL    RBC 3 98 3 81 - 5 12 Million/uL    Hemoglobin 9 7 (L) 11 5 - 15 4 g/dL    Hematocrit 30 5 (L) 34 8 - 46 1 %    MCV 77 (L) 82 - 98 fL    MCH 24 4 (L) 26 8 - 34 3 pg    MCHC 31 8 31 4 - 37 4 g/dL RDW 15 3 (H) 11 6 - 15 1 %    Platelets 778 061 - 781 Thousands/uL    MPV 10 3 8 9 - 12 7 fL   Hemoglobin and hematocrit, blood    Collection Time: 05/09/18 12:20 PM   Result Value Ref Range    Hemoglobin 10 3 (L) 11 5 - 15 4 g/dL    Hematocrit 32 1 (L) 34 8 - 46 1 %        Physical Exam   Constitutional: She is oriented to person, place, and time  She appears well-developed and well-nourished  No distress  HENT:   Head: Normocephalic and atraumatic  Right Ear: External ear normal    Left Ear: External ear normal    Mouth/Throat: Oropharynx is clear and moist    Eyes: Conjunctivae are normal  Pupils are equal, round, and reactive to light  No scleral icterus  Neck: Normal range of motion  Neck supple  No thyromegaly present  Cardiovascular: Normal rate, regular rhythm and normal heart sounds  Pulmonary/Chest: Effort normal and breath sounds normal  No respiratory distress  Abdominal: Soft  Musculoskeletal: Normal range of motion  She exhibits no edema  Neurological: She is alert and oriented to person, place, and time  Skin: Skin is warm and dry  Surgical wounds appear to be healing well without erythema, drainage, or dehiscence    Psychiatric: She has a normal mood and affect  Her behavior is normal  Judgment and thought content normal    Vitals reviewed          Date and time hospital follow up call was made:  5/11/2018 11:54 AM  Patient was hopsitalized at:  [address]  Date of admission:  5/8/18  Date of discharge:  5/10/18  Diagnosis:  obesity, s/p laparoscopic sleeve gastrectomy  Were the patients medicaitons reviewed and updated:  Yes  Current symptoms:  (Comment: post op discomfort and gas)  Post hospital issues:  None  Should patient be enrolled in anticoag monitoring?:  No  Scheduled for follow up?:  Yes  Patients specialists:  (Comment: surgeon)  Did you obtain your prescribed medications:  Yes  Do you need help managing your perscriptions or medications:  No  Is transportation to your appointments needed:  No  I have advised the patient to call PCP with any new or worsening symptoms (please type in name along with any credentials):  Raymond Bach RN  Are you recieving home care services:  No  Have you fallen in the last 12 months:  No  Interperter language line required?:  No  Counseling:  Patient  Comments:  5/11 LMOM to call office to schedule PETRA    TCM scmunirduled 5/15 w/ SC

## 2018-05-15 NOTE — PATIENT INSTRUCTIONS
Keep your appointment with Dr Lashaun Lao on Friday  get labs drawn today or tomorrow to further assess the anemia that was noted in the hospital     Return in 2-3 weeks for reassessment of anemia, as needed, or sooner

## 2018-05-16 ENCOUNTER — LAB (OUTPATIENT)
Dept: LAB | Age: 43
End: 2018-05-16
Payer: COMMERCIAL

## 2018-05-16 DIAGNOSIS — D64.9 ANEMIA, UNSPECIFIED TYPE: ICD-10-CM

## 2018-05-16 LAB
BASOPHILS # BLD AUTO: 0.03 THOUSANDS/ΜL (ref 0–0.1)
BASOPHILS NFR BLD AUTO: 0 % (ref 0–1)
EOSINOPHIL # BLD AUTO: 0.25 THOUSAND/ΜL (ref 0–0.61)
EOSINOPHIL NFR BLD AUTO: 3 % (ref 0–6)
ERYTHROCYTE [DISTWIDTH] IN BLOOD BY AUTOMATED COUNT: 14.8 % (ref 11.6–15.1)
FERRITIN SERPL-MCNC: 37 NG/ML (ref 8–388)
HCT VFR BLD AUTO: 39.8 % (ref 34.8–46.1)
HGB BLD-MCNC: 12.8 G/DL (ref 11.5–15.4)
IMM GRANULOCYTES # BLD AUTO: 0.02 THOUSAND/UL (ref 0–0.2)
IMM GRANULOCYTES NFR BLD AUTO: 0 % (ref 0–2)
IRON SATN MFR SERPL: 8 %
IRON SERPL-MCNC: 34 UG/DL (ref 50–170)
LYMPHOCYTES # BLD AUTO: 2.32 THOUSANDS/ΜL (ref 0.6–4.47)
LYMPHOCYTES NFR BLD AUTO: 32 % (ref 14–44)
MCH RBC QN AUTO: 24.3 PG (ref 26.8–34.3)
MCHC RBC AUTO-ENTMCNC: 32.2 G/DL (ref 31.4–37.4)
MCV RBC AUTO: 76 FL (ref 82–98)
MONOCYTES # BLD AUTO: 0.39 THOUSAND/ΜL (ref 0.17–1.22)
MONOCYTES NFR BLD AUTO: 5 % (ref 4–12)
NEUTROPHILS # BLD AUTO: 4.28 THOUSANDS/ΜL (ref 1.85–7.62)
NEUTS SEG NFR BLD AUTO: 59 % (ref 43–75)
NRBC BLD AUTO-RTO: 0 /100 WBCS
PLATELET # BLD AUTO: 436 THOUSANDS/UL (ref 149–390)
PMV BLD AUTO: 10.3 FL (ref 8.9–12.7)
RBC # BLD AUTO: 5.27 MILLION/UL (ref 3.81–5.12)
TIBC SERPL-MCNC: 411 UG/DL (ref 250–450)
WBC # BLD AUTO: 7.29 THOUSAND/UL (ref 4.31–10.16)

## 2018-05-16 PROCEDURE — 82728 ASSAY OF FERRITIN: CPT

## 2018-05-16 PROCEDURE — 36415 COLL VENOUS BLD VENIPUNCTURE: CPT

## 2018-05-16 PROCEDURE — 83550 IRON BINDING TEST: CPT

## 2018-05-16 PROCEDURE — 85025 COMPLETE CBC W/AUTO DIFF WBC: CPT

## 2018-05-16 PROCEDURE — 83540 ASSAY OF IRON: CPT

## 2018-05-18 ENCOUNTER — OFFICE VISIT (OUTPATIENT)
Dept: BARIATRICS | Facility: CLINIC | Age: 43
End: 2018-05-18

## 2018-05-18 VITALS
WEIGHT: 216 LBS | HEART RATE: 80 BPM | SYSTOLIC BLOOD PRESSURE: 116 MMHG | RESPIRATION RATE: 18 BRPM | HEIGHT: 63 IN | BODY MASS INDEX: 38.27 KG/M2 | TEMPERATURE: 97.2 F | DIASTOLIC BLOOD PRESSURE: 68 MMHG

## 2018-05-18 VITALS — WEIGHT: 216 LBS | HEIGHT: 63 IN | BODY MASS INDEX: 38.27 KG/M2

## 2018-05-18 DIAGNOSIS — E66.9 OBESITY, CLASS II, BMI 35-39.9: Primary | ICD-10-CM

## 2018-05-18 DIAGNOSIS — E66.01 MORBID (SEVERE) OBESITY DUE TO EXCESS CALORIES (HCC): Primary | ICD-10-CM

## 2018-05-18 PROCEDURE — 99024 POSTOP FOLLOW-UP VISIT: CPT | Performed by: SURGERY

## 2018-05-18 PROCEDURE — RECHECK

## 2018-05-18 NOTE — LETTER
May 18, 2018     Zakia Boyer, [address] [facility]    Patient: Padmini Webb   YOB: 1975   Date of Visit: 5/18/2018       Dear Dr Gilbert Vela:    Thank you for referring Seth Mccormack to me for evaluation  Below are my notes for this consultation  If you have questions, please do not hesitate to call me  I look forward to following your patient along with you  Sincerely,        Juan House MD        CC: No Recipients  Juan House MD  5/18/2018 10:11 AM  Sign at close encounter  [address] [place] [address] 38 y o  female MRN: [id number]  Unit/Bed#:  Encounter: [id number]      HPI:  Padmini Webb is a 38 y o  female who presents for follow up s/p laparoscopic sleeve gastrectomy    Review of Systems   Constitutional: Negative  HENT: Negative  Eyes: Negative  Respiratory: Negative  Cardiovascular: Negative  Gastrointestinal: Negative  Endocrine: Negative  Genitourinary: Negative  Musculoskeletal: Negative  Skin: Negative  Neurological: Negative  Hematological: Negative  Psychiatric/Behavioral: Negative          Historical Information   Past Medical History:   Diagnosis Date    CPAP (continuous positive airway pressure) dependence     Depression     History of anxiety     Left knee pain     "occas"    Low back pain     "mayra if standing for a while"    Morbid obesity (HCC)     Motion sickness     PONV (postoperative nausea and vomiting)     Prediabetes     Right ankle pain     " occas"    Shortness of breath     "exertional"    Sleep apnea     Mild    Walks frequently     for exercise    Wears glasses      Past Surgical History:   Procedure Laterality Date    ANKLE SURGERY      right with screws and plate implant    ANTERIOR CRUCIATE LIGAMENT REPAIR Left     screws implanted    BARIATRIC SURGERY  05/08/2018    KNEE CARTILAGE SURGERY      right    [place] EGD TRANSORAL BIOPSY SINGLE/MULTIPLE N/A 3/21/2018    Procedure: ESOPHAGOGASTRODUODENOSCOPY (EGD) with bx;  Surgeon: Juan House MD;  [facility]; Service: Bariatrics    CT LAP, ELIN RESTRICT PROC, LONGITUDINAL GASTRECTOMY N/A 5/8/2018    Procedure: LAPAROSCOPIC SLEEVE GASTRECTOMY WITH ROBOTICS;  Surgeon: Juan House MD;  [facility];  Service: Bariatrics    REDUCTION MAMMAPLASTY      WISDOM TOOTH EXTRACTION       Social History   History   Alcohol Use    Yes     Comment: social     History   Drug Use No     History   Smoking Status    Never Smoker   Smokeless Tobacco    Never Used     Family History: non-contributory    Meds/Allergies   all medications and allergies reviewed  No Known Allergies    Objective   First Vitals:   @VSFIRST2(5,8,6,7,9,11,14,10:FIRST)@    Current Vitals:   Blood Pressure: 116/68 (05/18/18 1003)  Pulse: 80 (05/18/18 1003)  Temperature: (!) 97 2 °F (36 2 °C) (05/18/18 1003)  Respirations: 18 (05/18/18 1003)  Height: 5' 3" (160 cm) (05/18/18 1003)  Weight - Scale: 98 kg (216 lb) (05/18/18 1003)    [unfilled]    Invasive Devices          No matching active lines, drains, or airways          Physical Exam   Constitutional: She appears well-developed and well-nourished  Abdominal: Soft  Bowel sounds are normal        Lab Results: I have personally reviewed pertinent lab results  Imaging: I have personally reviewed pertinent reports  EKG, Pathology, and Other Studies: I have personally reviewed pertinent reports  Code Status: [unfilled]  Advance Directive and Living Will:      Power of :    POLST:      Assessment/Plan:      Patient is presenting for the first postoperative visit, patient hospital stay was uneventful without any complications, patient is doing well, has no complaints, is taking vitamins as instructed, currently tolerating the blenderized diet, will advance to soft diet   Patient will also be meeting with our dietician today to review her vitamin and mineral supplements and also go over her diet and emphasize postoperative commitment and compliance  The patient was also instructed to start exercising on a regular basis  However, I recommended no heavy lifting, or weight exercises for another 2 weeks  F/U in 4 weeks  Patient was instructed to call if develops nausea, vomiting, fever or chills  Pathology was also reviewed and was negative

## 2018-05-18 NOTE — PROGRESS NOTES
FIRST POST-OPERATIVE VISIT - BARIATRIC SURGERY  Andrea Orellana 38 y o  female MRN: [id number]  Unit/Bed#:  Encounter: [id number]      HPI:  Andrea Orellana is a 38 y o  female who presents for follow up s/p laparoscopic sleeve gastrectomy    Review of Systems   Constitutional: Negative  HENT: Negative  Eyes: Negative  Respiratory: Negative  Cardiovascular: Negative  Gastrointestinal: Negative  Endocrine: Negative  Genitourinary: Negative  Musculoskeletal: Negative  Skin: Negative  Neurological: Negative  Hematological: Negative  Psychiatric/Behavioral: Negative  Historical Information   Past Medical History:   Diagnosis Date    CPAP (continuous positive airway pressure) dependence     Depression     History of anxiety     Left knee pain     "occas"    Low back pain     "mayra if standing for a while"    Morbid obesity (HCC)     Motion sickness     PONV (postoperative nausea and vomiting)     Prediabetes     Right ankle pain     " occas"    Shortness of breath     "exertional"    Sleep apnea     Mild    Walks frequently     for exercise    Wears glasses      Past Surgical History:   Procedure Laterality Date    ANKLE SURGERY      right with screws and plate implant    ANTERIOR CRUCIATE LIGAMENT REPAIR Left     screws implanted    BARIATRIC SURGERY  05/08/2018    KNEE CARTILAGE SURGERY      right    [place] EGD TRANSORAL BIOPSY SINGLE/MULTIPLE N/A 3/21/2018    Procedure: ESOPHAGOGASTRODUODENOSCOPY (EGD) with bx;  Surgeon: Arlen Harvey MD;  [facility];   Service: Bariatrics    [place] LAP, ELIN RESTRICT PROC, LONGITUDINAL GASTRECTOMY N/A 5/8/2018    Procedure: LAPAROSCOPIC SLEEVE GASTRECTOMY WITH ROBOTICS;  Surgeon: Arlen Harvey MD;  [facility];  Service: Bariatrics    REDUCTION MAMMAPLASTY      WISDOM TOOTH EXTRACTION       Social History   History   Alcohol Use    Yes     Comment: social     History   Drug Use No     History   Smoking Status    Never Smoker   Smokeless Tobacco    Never Used     Family History: non-contributory    Meds/Allergies   all medications and allergies reviewed  No Known Allergies    Objective   First Vitals:   @VSFIRST2(5,8,6,7,9,11,14,10:FIRST)@    Current Vitals:   Blood Pressure: 116/68 (05/18/18 1003)  Pulse: 80 (05/18/18 1003)  Temperature: (!) 97 2 °F (36 2 °C) (05/18/18 1003)  Respirations: 18 (05/18/18 1003)  Height: 5' 3" (160 cm) (05/18/18 1003)  Weight - Scale: 98 kg (216 lb) (05/18/18 1003)    [unfilled]    Invasive Devices          No matching active lines, drains, or airways          Physical Exam   Constitutional: She appears well-developed and well-nourished  Abdominal: Soft  Bowel sounds are normal        Lab Results: I have personally reviewed pertinent lab results  Imaging: I have personally reviewed pertinent reports  EKG, Pathology, and Other Studies: I have personally reviewed pertinent reports  Code Status: [unfilled]  Advance Directive and Living Will:      Power of :    POLST:      Assessment/Plan:      Patient is presenting for the first postoperative visit, patient hospital stay was uneventful without any complications, patient is doing well, has no complaints, is taking vitamins as instructed, currently tolerating the blenderized diet, will advance to soft diet  Patient will also be meeting with our dietician today to review her vitamin and mineral supplements and also go over her diet and emphasize postoperative commitment and compliance  The patient was also instructed to start exercising on a regular basis  However, I recommended no heavy lifting, or weight exercises for another 2 weeks  F/U in 4 weeks  Patient was instructed to call if develops nausea, vomiting, fever or chills  Pathology was also reviewed and was negative

## 2018-05-18 NOTE — PROGRESS NOTES
Weight Management Nutrition Class     Diagnosis: Obesity class II    Bariatric Surgeon: Dr Branden Cruz    Surgery: Vertical Sleeve Gastrectomy    Class: first post op note    Topics discussed today include:     fluid goals post op, protein goals post op, constipation, chew food well, exercise, avoidance of alcohol, PPI use, diet progression, protein supplems, vitamin/mineral supplements and calcium supplements    Patient was able to verbalize basic diet (protein, fluid, vitamin and mineral) recommendations and possible nutrition-related complications  Yes     -adhering to and tolerating pureed  -tried eggs, cream of broccoli soup, SF jello  -protein shake:  1 premier per day, difficult getting in two per day  -fluids:  42oz of G2, water and decaf unsweet tea  -with protein shake and food pt only consuming 35gm of protein and 42oz fluids  Discussed the importance of getting 60gm protein and 48-64oz fluids  -Vitamins:  1 [employer] health per day, wellesse calcium once a day, advised needs to increase to TID  -reviewed soft diet and advised to continue on for 8 weeks, until July 6th

## 2018-05-22 NOTE — PROGRESS NOTES
Per patient's communication consent form, message was left explaining the results of her lab testing  Her hemoglobin and hematocrit have improved where she is no longer considered anemic, however her iron level is low  An oral iron supplement would typically be recommended, however given the patient just recently had the gastric sleeve procedure done it was recommended that she contact her bariatric surgeon to discuss the most appropriate formulation of iron supplementation  The patient was advised to call our office for any further questions at this time    Will release the results to my chart for the patient to have access to

## 2018-05-30 ENCOUNTER — OFFICE VISIT (OUTPATIENT)
Dept: SLEEP CENTER | Facility: CLINIC | Age: 43
End: 2018-05-30
Payer: COMMERCIAL

## 2018-05-30 VITALS
WEIGHT: 215.2 LBS | HEART RATE: 64 BPM | DIASTOLIC BLOOD PRESSURE: 60 MMHG | HEIGHT: 63 IN | BODY MASS INDEX: 38.13 KG/M2 | SYSTOLIC BLOOD PRESSURE: 108 MMHG

## 2018-05-30 DIAGNOSIS — G47.33 OSA (OBSTRUCTIVE SLEEP APNEA): Primary | ICD-10-CM

## 2018-05-30 PROCEDURE — 99214 OFFICE O/P EST MOD 30 MIN: CPT | Performed by: INTERNAL MEDICINE

## 2018-05-30 NOTE — PROGRESS NOTES
Progress Note - Sleep Center   Nicole Shepherd :1975 MRN: [id number]      Reason for Visit:  38 y  o female here for PAP compliance check    Assessment:  Doing well with new PAP device  Sleep quality is improved and the patient reports less daytime sleepiness  Compliance data show utilization for less than 70% of nights, for greater than or equal to 4 hours per night  We discussed compliance and the patient will continue in her efforts  Of note, she has lost 17 lb since her bariatric surgery  Plan:  Continue same  I will recheck her compliance in 2 weeks  Follow up: One year    History of Present Illness:  History of MILTON on PAP therapy  Somewhat compliant and deriving benefit  The patient's AHI on testing was 15 4  Historical Information    Past Medical History:   Past Medical History:   Diagnosis Date    Anemia     CPAP (continuous positive airway pressure) dependence     Depression     History of anxiety     Left knee pain     "occas"    Low back pain     "mayra if standing for a while"    Morbid obesity (HCC)     Motion sickness     PONV (postoperative nausea and vomiting)     Prediabetes     Right ankle pain     " occas"    Shortness of breath     "exertional"    Sleep apnea     Mild    Walks frequently     for exercise    Wears glasses          Past Surgical History:   Past Surgical History:   Procedure Laterality Date    ANKLE SURGERY Bilateral     Right with screws and plate implant  Left ACL repair Resolved: [address] Left     screws implanted    BARIATRIC SURGERY  2018    KNEE CARTILAGE SURGERY      right    KNEE SURGERY Left     PA EGD TRANSORAL BIOPSY SINGLE/MULTIPLE N/A 3/21/2018    Procedure: ESOPHAGOGASTRODUODENOSCOPY (EGD) with bx;  Surgeon: Joan Bartlett MD;  Location: [facility];   Service: Bariatrics    PA LAP, ELIN RESTRICT PROC, LONGITUDINAL GASTRECTOMY N/A 2018    Procedure: LAPAROSCOPIC SLEEVE GASTRECTOMY WITH ROBOTICS;  Surgeon: Arlen Harvey MD;  Location: [facility];  Service: Bariatrics    REDUCTION MAMMAPLASTY Bilateral     Resolved: 2004    WISDOM TOOTH EXTRACTION               Family History:   Family History   Problem Relation Age of Onset    Hypertension Mother     Heart attack Mother     Diabetes type II Mother      Mellitus    Hypertension Father     Diabetes type II Father      Mellitus       Medications/Allergies:      Current Outpatient Prescriptions:     buPROPion (WELLBUTRIN XL) 150 mg 24 hr tablet, Take 1 tablet (150 mg total) by mouth 2 (two) times a day, Disp: 60 tablet, Rfl: 5    CALCIUM PO, Take by mouth 2 (two) times a day, Disp: , Rfl:     Multiple Vitamin (MULTIVITAMIN) tablet, Take 1 tablet by mouth daily, Disp: , Rfl:     omeprazole (PriLOSEC) 20 mg delayed release capsule, Take 1 capsule (20 mg total) by mouth daily, Disp: 90 capsule, Rfl: 3    enoxaparin (LOVENOX) 40 mg/0 4 mL, Inject 0 4 mL (40 mg total) under the skin daily for 13 days, Disp: 5 2 mL, Rfl: 0    oxyCODONE-acetaminophen (PERCOCET) 5-325 mg per tablet, Take 1 tablet by mouth every 4 (four) hours as needed for moderate pain Earliest Fill Date: 4/26/18 Max Daily Amount: 6 tablets, Disp: 30 tablet, Rfl: 0      Objective    Vital Signs:   Vitals:    05/30/18 1500   BP: 108/60   Pulse: 64     [place] Sleepiness Scale: Total score: 5        Physical Exam:    General: Alert, appropriate, cooperative, overweight    Head: NC/AT    Skin: Warm, dry    Neuro: No motor abnormalities, cranial nerves appear intact    Extremity: No clubbing, cyanosis    PAP setting:   APAP 4 to 20 cm  DME Provider: [facility]    Counseling / Coordination of Care  Total clinic time spent today 15 minutes  Greater than 50% of total time was spent with the patient and / or family counseling and / or coordination of care   A description of the counseling / coordination of care: Discussed equipment and response to treatment  KOLBY Cline    Board Certified Sleep Specialist      Review of Systems      Genitourinary none   Cardiology none   Gastrointestinal none   Neurology numbness/tingling of an extremity   Constitutional weight change   Integumentary none   Psychiatry none   Musculoskeletal none   Pulmonary none   ENT none   Endocrine none   Hematological none

## 2018-06-14 ENCOUNTER — TELEPHONE (OUTPATIENT)
Dept: SLEEP CENTER | Facility: CLINIC | Age: 43
End: 2018-06-14

## 2018-06-14 NOTE — TELEPHONE ENCOUNTER
I called the patient and left a message  Review of her compliance data shows insufficient usage  The patient's AHI is 15 and she is losing weight after bariatric surgery  Ultimately, if she cannot use the device sufficiently to meet insurance requirements and her APAP is taken away, I will recheck a home sleep test after she has achieved her plateau weight, to see if her obstructive sleep apnea has improved

## 2018-06-20 ENCOUNTER — CLINICAL SUPPORT (OUTPATIENT)
Dept: BARIATRICS | Facility: CLINIC | Age: 43
End: 2018-06-20

## 2018-06-20 VITALS — HEIGHT: 63 IN | WEIGHT: 204.4 LBS | BODY MASS INDEX: 36.21 KG/M2

## 2018-06-20 DIAGNOSIS — Z98.84 BARIATRIC SURGERY STATUS: ICD-10-CM

## 2018-06-20 DIAGNOSIS — E66.01 MORBID OBESITY (HCC): Primary | ICD-10-CM

## 2018-06-20 PROCEDURE — RECHECK: Performed by: DIETITIAN, REGISTERED

## 2018-06-20 NOTE — PROGRESS NOTES
Weight Management Nutrition Class     Diagnosis: Morbid Obesity    Bariatric Surgeon: Dr Aidan Martines    Surgery: Vertical Sleeve Gastrectomy    Class: five week post-op class    Topics discussed today include:     fluid goals post op, protein goals post op, constipation, chew food well, exercise, avoidance of alcohol, PPI use, diet progression, hypoglycemia, dumping syndrome, protein supplems, vitamin/mineral supplements and calcium supplements    Patient was able to verbalize basic diet (protein, fluid, vitamin and mineral) recommendations and possible nutrition-related complications  Yes     Patient attended 5 week post op class  Reviewed diet progression, vitamin and mineral recommendations, 30/60 rules, volume of foods, protein supplements, hydration needs, antacid  guidelines, recommendation to f/u with pcp concerning med adjustments, exercise guidelines, hair shedding, contraception guidelines, constipation prevention and treatment, alcohol avoidance and recommendations of when to call the office  Patient was also weighed and filled out form for program   Time was left for discussion and to answer questions

## 2018-08-29 PROBLEM — E78.6 LOW HDL (UNDER 40): Status: ACTIVE | Noted: 2018-08-29

## 2018-08-29 PROBLEM — E61.1 LOW IRON: Status: ACTIVE | Noted: 2018-08-29

## 2018-08-29 PROBLEM — K91.2 POSTSURGICAL MALABSORPTION: Status: ACTIVE | Noted: 2018-08-29

## 2018-08-29 NOTE — ASSESSMENT & PLAN NOTE
-s/p Vertical Sleeve Gastrectomy with robotics by  Dr Albert Alvarez on 5/8/2018  Overall doing Well  Initial:246 5 lb  Current: 183 lb  EWL: 60% which is above average for this time frame  Gary: Current  Current BMI is Body mass index is 32 42 kg/m²  Tolerating a regular diet-yes  Eating at least 60 grams of protein per day-no/advised on adding supplement  Following 30/60 minute rule with liquids-yes  Drinking at least 64 ounces of fluid per day-yes  Drinking carbonated beverages-no  Sufficient exercise-walking  Using NSAIDs regularly-no  Using nicotine-no  Using alcohol-no    She has not been consistently taking her ppi-advised to continue daily until she is seen back at her next visit in 3 months

## 2018-08-29 NOTE — ASSESSMENT & PLAN NOTE
Had low serum iron but ok iron stores by May labs done by PCP post-op  She is currently getting 45 mg of iron in her bariatric supplement-will check levels now

## 2018-08-29 NOTE — ASSESSMENT & PLAN NOTE
Malabsorption- patient is at risk for malabsorption of vitamins/minerals secondary to malabsorption from her procedure and restriction of intakes  Reviewed current supplements and advised on same    She is currently taking one eflow MVI with 45 mg of iron  And 500 mg calcium citrate with D three per day  She is due for routine labs-will order same

## 2018-08-29 NOTE — ASSESSMENT & PLAN NOTE
Has low HDL by January 2018 labs-and only marginally high LDL (one point high)  Continued weight loss and  Increasing regular exercise likely to help

## 2018-08-30 ENCOUNTER — OFFICE VISIT (OUTPATIENT)
Dept: BARIATRICS | Facility: CLINIC | Age: 43
End: 2018-08-30
Payer: COMMERCIAL

## 2018-08-30 VITALS
DIASTOLIC BLOOD PRESSURE: 70 MMHG | TEMPERATURE: 97.5 F | BODY MASS INDEX: 32.43 KG/M2 | HEIGHT: 63 IN | SYSTOLIC BLOOD PRESSURE: 110 MMHG | WEIGHT: 183 LBS | HEART RATE: 80 BPM

## 2018-08-30 DIAGNOSIS — G47.33 OBSTRUCTIVE SLEEP APNEA, ADULT: ICD-10-CM

## 2018-08-30 DIAGNOSIS — E78.6 LOW HDL (UNDER 40): ICD-10-CM

## 2018-08-30 DIAGNOSIS — K91.2 POSTSURGICAL MALABSORPTION: ICD-10-CM

## 2018-08-30 DIAGNOSIS — E61.1 LOW IRON: ICD-10-CM

## 2018-08-30 DIAGNOSIS — E66.9 OBESITY, CLASS I, BMI 30-34.9: ICD-10-CM

## 2018-08-30 DIAGNOSIS — Z98.84 BARIATRIC SURGERY STATUS: Primary | ICD-10-CM

## 2018-08-30 PROBLEM — E66.01 OBESITY, CLASS III, BMI 40-49.9 (MORBID OBESITY) (HCC): Status: RESOLVED | Noted: 2018-04-16 | Resolved: 2018-08-30

## 2018-08-30 PROBLEM — E66.811 OBESITY, CLASS I, BMI 30-34.9: Status: ACTIVE | Noted: 2018-08-30

## 2018-08-30 PROBLEM — E66.813 OBESITY, CLASS III, BMI 40-49.9 (MORBID OBESITY): Status: RESOLVED | Noted: 2018-04-16 | Resolved: 2018-08-30

## 2018-08-30 PROBLEM — E66.01 MORBID OBESITY (HCC): Status: RESOLVED | Noted: 2018-03-15 | Resolved: 2018-08-30

## 2018-08-30 PROCEDURE — 99214 OFFICE O/P EST MOD 30 MIN: CPT | Performed by: PHYSICIAN ASSISTANT

## 2018-08-30 NOTE — PROGRESS NOTES
Assessment/Plan:    Postsurgical malabsorption  Malabsorption- patient is at risk for malabsorption of vitamins/minerals secondary to malabsorption from her procedure and restriction of intakes  Reviewed current supplements and advised on same    She is currently taking one [employer] MVI with 45 mg of iron  And 500 mg calcium citrate with D three per day  She is due for routine labs-will order same      Bariatric surgery status  -s/p Vertical Sleeve Gastrectomy with robotics by  Dr Nikole Andrews on 5/8/2018  Overall doing Well  Initial:246 5 lb  Current: 183 lb  EWL: 60% which is above average for this time frame  Gary: Current  Current BMI is Body mass index is 32 42 kg/m²  Tolerating a regular diet-yes  Eating at least 60 grams of protein per day-no/advised on adding supplement  Following 30/60 minute rule with liquids-yes  Drinking at least 64 ounces of fluid per day-yes  Drinking carbonated beverages-no  Sufficient exercise-walking  Using NSAIDs regularly-no  Using nicotine-no  Using alcohol-no    She has not been consistently taking her ppi-advised to continue daily until she is seen back at her next visit in 3 months  Low HDL (under 40)  Has low HDL by January 2018 labs-and only marginally high LDL (one point high)  Continued weight loss and  Increasing regular exercise likely to help    Low iron  Had low serum iron but ok iron stores by May labs done by PCP post-op  She is currently getting 45 mg of iron in her bariatric supplement-will check levels now  Obstructive sleep apnea, adult  Is not  wearing Cpap for around 2 months Reports her sleep medicine advised her she could stop it but she did not have a repeat sleep study  She is not sure if she is snoring  She notes she is not experiencing fatigue and she notes she will be having another routine follow-up with sleep medicine in several months        Obesity, Class I, BMI 30-34 9  Improved from obesity II       Diagnoses and all orders for this visit:    Bariatric surgery status    Postsurgical malabsorption    Obstructive sleep apnea, adult    Low iron    Low HDL (under 40)    Obesity, Class I, BMI 30-34 9          Subjective:      Patient ID: Melina Luther is a 38 y o  female  She is tolerating a regular diet  She is walking for exercise and is taking vitamins  She has not been consistently taking her omeprazole  She has no complaints today  The following portions of the patient's history were reviewed and updated as appropriate: allergies, current medications, past family history, past medical history, past social history, past surgical history and problem list     Review of Systems   Constitutional: Negative for chills and fever  Unexpected weight change: planned weight loss  Respiratory: Negative for shortness of breath and wheezing  Cardiovascular: Negative for chest pain and palpitations  Gastrointestinal: Negative for abdominal pain, constipation, diarrhea, nausea and vomiting  Psychiatric/Behavioral: Suicidal ideas: no complait of anxiety or depression  Objective:      /70 (BP Location: Left arm, Patient Position: Sitting, Cuff Size: Adult)   Pulse 80   Temp 97 5 °F (36 4 °C) (Tympanic)   Ht 5' 3" (1 6 m)   Wt 83 kg (183 lb)   BMI 32 42 kg/m²          Physical Exam   Constitutional: She is oriented to person, place, and time  She appears well-developed and well-nourished  HENT:   Mouth/Throat: Oropharynx is clear and moist    Eyes: Conjunctivae are normal  No scleral icterus  Cardiovascular: Normal rate, regular rhythm and normal heart sounds  Pulmonary/Chest: Effort normal and breath sounds normal    Abdominal: Soft  There is no tenderness  No incisional hernias appreciated   Musculoskeletal:   Normal gait   Neurological: She is alert and oriented to person, place, and time  Psychiatric: She has a normal mood and affect   Her behavior is normal  Judgment and thought content normal  Nursing note and vitals reviewed  GOALS: Continued weight loss with good nutrition intakes    Normal vitamin and mineral levels  Exercise as tolerated    BARRIERS: none identified

## 2018-08-30 NOTE — ASSESSMENT & PLAN NOTE
Is not  wearing Cpap for around 2 months Reports her sleep medicine advised her she could stop it but she did not have a repeat sleep study  She is not sure if she is snoring  She notes she is not experiencing fatigue and she notes she will be having another routine follow-up with sleep medicine in several months

## 2018-09-12 ENCOUNTER — OFFICE VISIT (OUTPATIENT)
Dept: INTERNAL MEDICINE CLINIC | Age: 43
End: 2018-09-12
Payer: COMMERCIAL

## 2018-09-12 VITALS
BODY MASS INDEX: 32.14 KG/M2 | DIASTOLIC BLOOD PRESSURE: 78 MMHG | HEART RATE: 76 BPM | HEIGHT: 63 IN | WEIGHT: 181.4 LBS | OXYGEN SATURATION: 99 % | SYSTOLIC BLOOD PRESSURE: 104 MMHG | TEMPERATURE: 98 F

## 2018-09-12 DIAGNOSIS — Z20.1 CONTACT WITH AND (SUSPECTED) EXPOSURE TO TUBERCULOSIS: ICD-10-CM

## 2018-09-12 DIAGNOSIS — F32.A DEPRESSION, UNSPECIFIED DEPRESSION TYPE: ICD-10-CM

## 2018-09-12 DIAGNOSIS — Z98.84 BARIATRIC SURGERY STATUS: ICD-10-CM

## 2018-09-12 DIAGNOSIS — Z12.31 ENCOUNTER FOR SCREENING MAMMOGRAM FOR MALIGNANT NEOPLASM OF BREAST: Primary | ICD-10-CM

## 2018-09-12 PROBLEM — K21.9 GASTROESOPHAGEAL REFLUX DISEASE WITHOUT ESOPHAGITIS: Status: ACTIVE | Noted: 2018-09-12

## 2018-09-12 PROCEDURE — 99214 OFFICE O/P EST MOD 30 MIN: CPT | Performed by: INTERNAL MEDICINE

## 2018-09-12 PROCEDURE — 3008F BODY MASS INDEX DOCD: CPT | Performed by: INTERNAL MEDICINE

## 2018-09-12 NOTE — PROGRESS NOTES
Assessment/Plan:    No problem-specific Assessment & Plan notes found for this encounter  Diagnoses and all orders for this visit:    Encounter for screening mammogram for malignant neoplasm of breast  -     Mammo screening bilateral w cad; Future    Bariatric surgery status   doing very well after the bariatric surgery  Depression, unspecified depression type   anxiety and depression is       she was exposed to tuberculosis in the assisted system, reason know whether it was an active tuberculosis but she is concerned about the that her PPD was negative, her  has a systemic lupus erythematosus and he is on immuno compromising medication and she wanted to get the QuantiFERON gold test to make sure that the she does not have any tuberculosis or exposed to it  Subjective:    does not have any complains   Patient ID: Padmini Webb is a 38 y o  female  HPI   please see above for further details on HPI he  The following portions of the patient's history were reviewed and updated as appropriate: allergies, current medications, past family history, past medical history, past social history, past surgical history and problem list     Review of Systems   Constitutional: Positive for fatigue  Negative for chills, diaphoresis and fever  HENT: Negative for congestion, ear pain, sore throat and trouble swallowing  Eyes: Negative for pain  Respiratory: Negative for cough, shortness of breath and wheezing  Cardiovascular: Negative for chest pain and palpitations  Gastrointestinal: Positive for constipation (noted in HPI)  Negative for abdominal distention, abdominal pain, nausea and vomiting  Genitourinary: Negative for dysuria  Musculoskeletal: Negative for gait problem  Skin: Negative for rash and wound (surgical sites from laprascopy with glue intact)  Neurological: Positive for weakness  Negative for dizziness, syncope and light-headedness     Psychiatric/Behavioral: The patient is not nervous/anxious  Past Medical History:   Diagnosis Date    Anemia     CPAP (continuous positive airway pressure) dependence     Depression     History of anxiety     Left knee pain     "occas"    Low back pain     "mayra if standing for a while"    Morbid obesity (HCC)     Motion sickness     PONV (postoperative nausea and vomiting)     Postgastrectomy malabsorption     Prediabetes     Right ankle pain     " occas"    Shortness of breath     "exertional"    Sleep apnea     Mild    Walks frequently     for exercise    Wears glasses          Current Outpatient Prescriptions:     buPROPion (WELLBUTRIN XL) 150 mg 24 hr tablet, Take 1 tablet (150 mg total) by mouth 2 (two) times a day, Disp: 60 tablet, Rfl: 5    CALCIUM PO, Take 1 each by mouth 3 (three) times a day  , Disp: , Rfl:     Multiple Vitamin (MULTIVITAMIN) tablet, Take 1 tablet by mouth daily, Disp: , Rfl:     omeprazole (PriLOSEC) 20 mg delayed release capsule, Take 1 capsule (20 mg total) by mouth daily (Patient taking differently: Take 20 mg by mouth daily as needed  ), Disp: 90 capsule, Rfl: 3    No Known Allergies    Social History   Past Surgical History:   Procedure Laterality Date    ANKLE SURGERY Bilateral     Right with screws and plate implant  Left ACL repair Resolved: [address]     screws implanted    BARIATRIC SURGERY  05/08/2018    KNEE CARTILAGE SURGERY      right    KNEE SURGERY Left     RI EGD TRANSORAL BIOPSY SINGLE/MULTIPLE N/A 3/21/2018    Procedure: ESOPHAGOGASTRODUODENOSCOPY (EGD) with bx;  Surgeon: Crystal Ashby MD;  Location: [facility];   Service: Bariatrics    RI LAP, ELIN RESTRICT PROC, LONGITUDINAL GASTRECTOMY N/A 5/8/2018    Procedure: LAPAROSCOPIC SLEEVE GASTRECTOMY WITH ROBOTICS;  Surgeon: Crystal Ashby MD;  Location: [facility];  Service: Bariatrics    REDUCTION MAMMAPLASTY Bilateral     Resolved: 2004    WISDOM TOOTH EXTRACTION       Family History Problem Relation Age of Onset    Hypertension Mother     Heart attack Mother     Diabetes type II Mother         Mellitus    Hypertension Father     Diabetes type II Father         Mellitus       Objective:  /78 (BP Location: Left arm, Patient Position: Sitting, Cuff Size: Adult)   Pulse 76   Temp 98 °F (36 7 °C) (Tympanic)   Ht 5' 2 5" (1 588 m)   Wt 82 3 kg (181 lb 6 4 oz)   SpO2 99% Comment: room air  BMI 32 65 kg/m²        Physical Exam   Constitutional: She is oriented to person, place, and time  She appears well-developed and well-nourished  No distress  HENT:   Head: Normocephalic and atraumatic  Right Ear: External ear normal    Left Ear: External ear normal    Mouth/Throat: Oropharynx is clear and moist    Eyes: Conjunctivae are normal  Pupils are equal, round, and reactive to light  No scleral icterus  Neck: Normal range of motion  Neck supple  No thyromegaly present  Cardiovascular: Normal rate, regular rhythm and normal heart sounds  Pulmonary/Chest: Effort normal and breath sounds normal  No respiratory distress  Abdominal: Soft  Musculoskeletal: Normal range of motion  She exhibits no edema  Neurological: She is alert and oriented to person, place, and time  Skin: Skin is warm and dry  Surgical wounds appear to be healing well without erythema, drainage, or dehiscence    Psychiatric: She has a normal mood and affect  Her behavior is normal  Judgment and thought content normal    Vitals reviewed

## 2018-10-10 DIAGNOSIS — K91.2 POSTSURGICAL MALABSORPTION: ICD-10-CM

## 2018-10-10 DIAGNOSIS — E61.1 IRON DEFICIENCY: ICD-10-CM

## 2018-10-10 DIAGNOSIS — Z98.84 BARIATRIC SURGERY STATUS: Primary | ICD-10-CM

## 2018-10-30 ENCOUNTER — APPOINTMENT (OUTPATIENT)
Dept: LAB | Age: 43
End: 2018-10-30
Payer: COMMERCIAL

## 2018-10-30 ENCOUNTER — TRANSCRIBE ORDERS (OUTPATIENT)
Dept: ADMINISTRATIVE | Age: 43
End: 2018-10-30

## 2018-10-30 DIAGNOSIS — E61.1 IRON DEFICIENCY: ICD-10-CM

## 2018-10-30 DIAGNOSIS — K91.2 POSTSURGICAL MALABSORPTION: ICD-10-CM

## 2018-10-30 DIAGNOSIS — Z98.84 BARIATRIC SURGERY STATUS: ICD-10-CM

## 2018-10-30 LAB
25(OH)D3 SERPL-MCNC: 35.3 NG/ML (ref 30–100)
ALBUMIN SERPL BCP-MCNC: 3.6 G/DL (ref 3.5–5)
ALP SERPL-CCNC: 73 U/L (ref 46–116)
ALT SERPL W P-5'-P-CCNC: 21 U/L (ref 12–78)
ANION GAP SERPL CALCULATED.3IONS-SCNC: 5 MMOL/L (ref 4–13)
AST SERPL W P-5'-P-CCNC: 11 U/L (ref 5–45)
BILIRUB SERPL-MCNC: 0.62 MG/DL (ref 0.2–1)
BUN SERPL-MCNC: 12 MG/DL (ref 5–25)
CALCIUM SERPL-MCNC: 8.8 MG/DL (ref 8.3–10.1)
CHLORIDE SERPL-SCNC: 106 MMOL/L (ref 100–108)
CO2 SERPL-SCNC: 27 MMOL/L (ref 21–32)
CREAT SERPL-MCNC: 0.81 MG/DL (ref 0.6–1.3)
ERYTHROCYTE [DISTWIDTH] IN BLOOD BY AUTOMATED COUNT: 14.8 % (ref 11.6–15.1)
FERRITIN SERPL-MCNC: 9 NG/ML (ref 8–388)
FOLATE SERPL-MCNC: >20 NG/ML (ref 3.1–17.5)
GFR SERPL CREATININE-BSD FRML MDRD: 90 ML/MIN/1.73SQ M
GLUCOSE P FAST SERPL-MCNC: 82 MG/DL (ref 65–99)
HCT VFR BLD AUTO: 38.1 % (ref 34.8–46.1)
HGB BLD-MCNC: 11.9 G/DL (ref 11.5–15.4)
IRON SATN MFR SERPL: 13 %
IRON SERPL-MCNC: 50 UG/DL (ref 50–170)
MCH RBC QN AUTO: 25.4 PG (ref 26.8–34.3)
MCHC RBC AUTO-ENTMCNC: 31.2 G/DL (ref 31.4–37.4)
MCV RBC AUTO: 81 FL (ref 82–98)
PLATELET # BLD AUTO: 377 THOUSANDS/UL (ref 149–390)
PMV BLD AUTO: 10.5 FL (ref 8.9–12.7)
POTASSIUM SERPL-SCNC: 4 MMOL/L (ref 3.5–5.3)
PROT SERPL-MCNC: 6.7 G/DL (ref 6.4–8.2)
PTH-INTACT SERPL-MCNC: 66.6 PG/ML (ref 18.4–80.1)
RBC # BLD AUTO: 4.68 MILLION/UL (ref 3.81–5.12)
SODIUM SERPL-SCNC: 138 MMOL/L (ref 136–145)
TIBC SERPL-MCNC: 378 UG/DL (ref 250–450)
VIT B12 SERPL-MCNC: 532 PG/ML (ref 100–900)
WBC # BLD AUTO: 6.82 THOUSAND/UL (ref 4.31–10.16)

## 2018-10-30 PROCEDURE — 84425 ASSAY OF VITAMIN B-1: CPT

## 2018-10-30 PROCEDURE — 85027 COMPLETE CBC AUTOMATED: CPT

## 2018-10-30 PROCEDURE — 83550 IRON BINDING TEST: CPT

## 2018-10-30 PROCEDURE — 83970 ASSAY OF PARATHORMONE: CPT

## 2018-10-30 PROCEDURE — 36415 COLL VENOUS BLD VENIPUNCTURE: CPT

## 2018-10-30 PROCEDURE — 82746 ASSAY OF FOLIC ACID SERUM: CPT

## 2018-10-30 PROCEDURE — 82728 ASSAY OF FERRITIN: CPT

## 2018-10-30 PROCEDURE — 82525 ASSAY OF COPPER: CPT

## 2018-10-30 PROCEDURE — 82607 VITAMIN B-12: CPT

## 2018-10-30 PROCEDURE — 84630 ASSAY OF ZINC: CPT

## 2018-10-30 PROCEDURE — 82306 VITAMIN D 25 HYDROXY: CPT

## 2018-10-30 PROCEDURE — 80053 COMPREHEN METABOLIC PANEL: CPT

## 2018-10-30 PROCEDURE — 83540 ASSAY OF IRON: CPT

## 2018-11-01 LAB
COPPER SERPL-MCNC: 104 UG/DL (ref 72–166)
ZINC SERPL-MCNC: 69 UG/DL (ref 56–134)

## 2018-11-02 LAB — VIT B1 BLD-SCNC: 143.7 NMOL/L (ref 66.5–200)

## 2018-11-29 ENCOUNTER — OFFICE VISIT (OUTPATIENT)
Dept: BARIATRICS | Facility: CLINIC | Age: 43
End: 2018-11-29
Payer: COMMERCIAL

## 2018-11-29 VITALS
TEMPERATURE: 97.9 F | HEART RATE: 70 BPM | SYSTOLIC BLOOD PRESSURE: 100 MMHG | DIASTOLIC BLOOD PRESSURE: 72 MMHG | HEIGHT: 63 IN | WEIGHT: 172 LBS | BODY MASS INDEX: 30.48 KG/M2

## 2018-11-29 DIAGNOSIS — K91.2 POSTSURGICAL MALABSORPTION: ICD-10-CM

## 2018-11-29 DIAGNOSIS — E66.9 OBESITY, CLASS I, BMI 30-34.9: ICD-10-CM

## 2018-11-29 DIAGNOSIS — E78.6 LOW HDL (UNDER 40): ICD-10-CM

## 2018-11-29 DIAGNOSIS — G47.33 OBSTRUCTIVE SLEEP APNEA, ADULT: ICD-10-CM

## 2018-11-29 DIAGNOSIS — Z98.84 BARIATRIC SURGERY STATUS: Primary | ICD-10-CM

## 2018-11-29 DIAGNOSIS — K21.9 GASTROESOPHAGEAL REFLUX DISEASE WITHOUT ESOPHAGITIS: ICD-10-CM

## 2018-11-29 PROCEDURE — 99214 OFFICE O/P EST MOD 30 MIN: CPT | Performed by: PHYSICIAN ASSISTANT

## 2018-11-29 NOTE — ASSESSMENT & PLAN NOTE
She has lost 11 lb since her last visit and has exceeded weight loss expectation for this time frame   I expect weight loss to slow/stop soon-advised patient of same

## 2018-11-29 NOTE — ASSESSMENT & PLAN NOTE
Malabsorption- patient is at risk for malabsorption of vitamins/minerals secondary to malabsorption from her procedure and restriction of intakes  Reviewed current supplements and advised on same    She is taking one procare with 18 mg of iron  And 2 chewable calcium with D

## 2018-11-29 NOTE — ASSESSMENT & PLAN NOTE
By January 2018 labs with only slight high LDL-will repeat lipids with next panel-but advised if abnormal to follow-up with PCP

## 2018-11-29 NOTE — ASSESSMENT & PLAN NOTE
-s/p Mone-En-Y Gastric Bypass with robotics  by Dr Isaac Donnelly  on 5/8/2018 Overall doing Well  Initial: 246 5 lb  Current: 172 lb  EWL:71%  Gary: Current  Current BMI is Body mass index is 30 47 kg/m²      Tolerating a regular diet-yes  Eating at least 60 grams of protein per day-yes  Following 30/60 minute rule with liquids-yes  Drinking at least 64 ounces of fluid per day-yes  Drinking carbonated beverages-no  Sufficient exercise-cross fit once/week and 2-3 days with gym with combination of cardio and weights  Using NSAIDs regularly-no  Using nicotine-no  Using alcohol-yes

## 2018-11-29 NOTE — ASSESSMENT & PLAN NOTE
She is not wearing her Cpap notes she could only tolerate it a few hours at a time which predates her surgery-saw her sleep medicine doctor around July and advised her since she was " non-compliant" as reported by patient  that he told her  not use it for now  She notes not snoring/no morning or daytime drowsiness  She does report she has follow-up with sleep medicine in early 2018- reviewing his phone note he is planning for her to get repeat home study when weight plateaus which is likely soon

## 2018-11-29 NOTE — ASSESSMENT & PLAN NOTE
She notes just very  rare use of her ppi now-otherwise no heart burn to speak of-advised can continue prn use for now-once rx is completed advised I would like to try her on famotidine 20 mg up to twice daily prn instead-advised patient of same

## 2018-11-29 NOTE — PATIENT INSTRUCTIONS
Follow-up in 6 months  We kindly ask that your arrive 15 minutes before your scheduled appointment time with your provider to allow our staff to room you, get your vital signs and update your chart  We thank you for your patience at your visit  Follow diet as discussed  Get lab work done prior to next office visit  It is recommended to check with your insurance BEFORE getting labs done to make sure they are covered by your policy  Make sure to HOLD any multivitamins that may contain biotin and any biotin supplements FOR 5 DAYS before any labs since it can affect the results  Follow vitamin  and mineral recommendations as reviewed with you  Exercise as tolerated    Call our office if you have any problems with abdominal pain especially associated with fever, chills, nausea, vomiting or any other concerns  All  Post-bariatric surgery patients should be aware that very small quantities of any alcohol  can cause impairment and it is very possible not to feel the effect  The effect can be in the system for several hours  It is also a stomach irritant  It is advised to AVOID alcohol, Nonsteroidal antiinflammatory drugs (NSAIDS) and nicotine of all forms   Any of these can cause stomach irritation/pain

## 2018-11-29 NOTE — PROGRESS NOTES
Assessment/Plan:    Bariatric surgery status  -s/p Mone-En-Y Gastric Bypass with robotics  by Dr Jung Pelaez  on 5/8/2018 Overall doing Well  Initial: 246 5 lb  Current: 172 lb  EWL:71%  Gary: Current  Current BMI is Body mass index is 30 47 kg/m²  Tolerating a regular diet-yes  Eating at least 60 grams of protein per day-yes  Following 30/60 minute rule with liquids-yes  Drinking at least 64 ounces of fluid per day-yes  Drinking carbonated beverages-no  Sufficient exercise-cross fit once/week and 2-3 days with gym with combination of cardio and weights  Using NSAIDs regularly-no  Using nicotine-no  Using alcohol-yes      Postsurgical malabsorption  Malabsorption- patient is at risk for malabsorption of vitamins/minerals secondary to malabsorption from her procedure and restriction of intakes  Reviewed current supplements and advised on same    She is taking one procare with 18 mg of iron  And 2 chewable calcium with D    Obstructive sleep apnea, adult  She is not wearing her Cpap notes she could only tolerate it a few hours at a time which predates her surgery-saw her sleep medicine doctor around July and advised her since she was " non-compliant" as reported by patient  that he told her  not use it for now  She notes not snoring/no morning or daytime drowsiness  She does report she has follow-up with sleep medicine in early 2018- reviewing his phone note he is planning for her to get repeat home study when weight plateaus which is likely soon  Gastroesophageal reflux disease without esophagitis  She notes just very  rare use of her ppi now-otherwise no heart burn to speak of-advised can continue prn use for now-once rx is completed advised I would like to try her on famotidine 20 mg up to twice daily prn instead-advised patient of same    Obesity, Class I, BMI 30-34 9  She has lost 11 lb since her last visit and has exceeded weight loss expectation for this time frame   I expect weight loss to slow/stop soon-advised patient of same    Low HDL (under 40)  By January 2018 labs with only slight high LDL-will repeat lipids with next panel-but advised if abnormal to follow-up with PCP       Diagnoses and all orders for this visit:    Bariatric surgery status  -     CBC and Platelet; Future  -     Comprehensive metabolic panel; Future  -     Copper Level; Future  -     Ferritin; Future  -     Folate; Future  -     Iron Saturation %; Future  -     Lipid panel; Future  -     PTH, intact; Future  -     Vitamin A; Future  -     Vitamin B1, whole blood; Future  -     Vitamin B12; Future  -     Vitamin D 25 hydroxy; Future  -     Zinc; Future    Postsurgical malabsorption  -     CBC and Platelet; Future  -     Comprehensive metabolic panel; Future  -     Copper Level; Future  -     Ferritin; Future  -     Folate; Future  -     Iron Saturation %; Future  -     Lipid panel; Future  -     PTH, intact; Future  -     Vitamin A; Future  -     Vitamin B1, whole blood; Future  -     Vitamin B12; Future  -     Vitamin D 25 hydroxy; Future  -     Zinc; Future    Obstructive sleep apnea, adult    Gastroesophageal reflux disease without esophagitis    Obesity, Class I, BMI 30-34 9  -     CBC and Platelet; Future  -     Comprehensive metabolic panel; Future  -     Copper Level; Future  -     Ferritin; Future  -     Folate; Future  -     Iron Saturation %; Future  -     Lipid panel; Future  -     PTH, intact; Future  -     Vitamin A; Future  -     Vitamin B1, whole blood; Future  -     Vitamin B12; Future  -     Vitamin D 25 hydroxy; Future  -     Zinc; Future    Low HDL (under 40)  -     Lipid panel; Future          Subjective:      Patient ID: Dada Paz is a 41 y o  female  She is here in routine follow-up  She is tolerating a regular diet  She is exercising and taking bariatric supplements   She is happy with her weight loss and has no complaints today        The following portions of the patient's history were reviewed and updated as appropriate: allergies, current medications, past family history, past medical history, past social history, past surgical history and problem list     Review of Systems   Constitutional: Negative for chills and fever  Unexpected weight change: planned weight loss  Respiratory: Negative for shortness of breath and wheezing  Cardiovascular: Negative for chest pain and palpitations  Gastrointestinal: Negative for abdominal pain, constipation, diarrhea, nausea and vomiting  Psychiatric/Behavioral: Suicidal ideas: no complait of anxiety or depression  Objective:      /72 (BP Location: Left arm, Patient Position: Sitting, Cuff Size: Adult)   Pulse 70   Temp 97 9 °F (36 6 °C) (Tympanic)   Ht 5' 3" (1 6 m)   Wt 78 kg (172 lb)   BMI 30 47 kg/m²          Physical Exam   Constitutional: She is oriented to person, place, and time  She appears well-developed and well-nourished  HENT:   Mouth/Throat: Oropharynx is clear and moist    Eyes: Conjunctivae are normal  No scleral icterus  Cardiovascular: Normal rate, regular rhythm and normal heart sounds  Pulmonary/Chest: Effort normal and breath sounds normal    Abdominal: Soft  There is no tenderness  No incisional hernias appreciated   Musculoskeletal:   Normal gait   Neurological: She is alert and oriented to person, place, and time  Psychiatric: She has a normal mood and affect  Her behavior is normal  Judgment and thought content normal    Nursing note and vitals reviewed        Maintain weight loss+/- few pound further loss with good nutrition intakes  Normal vitamin and mineral levels  Exercise as tolerated

## 2019-01-17 ENCOUNTER — OFFICE VISIT (OUTPATIENT)
Dept: INTERNAL MEDICINE CLINIC | Age: 44
End: 2019-01-17
Payer: COMMERCIAL

## 2019-01-17 VITALS
WEIGHT: 170.8 LBS | OXYGEN SATURATION: 99 % | HEIGHT: 63 IN | TEMPERATURE: 97.5 F | SYSTOLIC BLOOD PRESSURE: 106 MMHG | DIASTOLIC BLOOD PRESSURE: 78 MMHG | HEART RATE: 107 BPM | BODY MASS INDEX: 30.26 KG/M2

## 2019-01-17 DIAGNOSIS — K21.9 GASTROESOPHAGEAL REFLUX DISEASE WITHOUT ESOPHAGITIS: Primary | ICD-10-CM

## 2019-01-17 DIAGNOSIS — F32.A DEPRESSION, UNSPECIFIED DEPRESSION TYPE: ICD-10-CM

## 2019-01-17 PROCEDURE — 1036F TOBACCO NON-USER: CPT | Performed by: INTERNAL MEDICINE

## 2019-01-17 PROCEDURE — 3008F BODY MASS INDEX DOCD: CPT | Performed by: INTERNAL MEDICINE

## 2019-01-17 PROCEDURE — 99214 OFFICE O/P EST MOD 30 MIN: CPT | Performed by: INTERNAL MEDICINE

## 2019-01-17 RX ORDER — BUPROPION HYDROCHLORIDE 150 MG/1
150 TABLET ORAL 2 TIMES DAILY
Qty: 180 TABLET | Refills: 1 | Status: SHIPPED | OUTPATIENT
Start: 2019-01-17 | End: 2019-08-07 | Stop reason: SDUPTHER

## 2019-01-17 NOTE — PROGRESS NOTES
Assessment/Plan:    No problem-specific Assessment & Plan notes found for this encounter  Diagnoses and all orders for this visit:    Gastroesophageal reflux disease without esophagitis  Gastroesophageal reflux disease is stable she takes the medication as needed I discussed with him the side effects omeprazole and the told her that she can take it as needed or try over-the-counter Pepcid for her gastroesophageal reflux problem she understand that recently she lost the over 80 lb after the bariatric surgery  Depression, unspecified depression type  -     buPROPion (WELLBUTRIN XL) 150 mg 24 hr tablet; Take 1 tablet (150 mg total) by mouth 2 (two) times a day    anxiety and depression is better but she is under the lot of stress because she is losing her house and may need to get the other house  She is active working    Subjective:   No new complaints, status post bariatric surgery she is doing better she is taking bariatric vitamins supplements   Patient ID: Huy Fox is a 41 y o  female  Patient is status post the bariatric surgery for morbid obesity she is followed up by the bariatric surgeon and the regular blood workups were ordered she will be followed up soon by them her next appointment with them is a May 29, 2019 her last appointment was in October HPI    The following portions of the patient's history were reviewed and updated as appropriate: allergies, current medications, past family history, past medical history, past social history, past surgical history and problem list     Review of Systems   Constitutional: Negative for chills, diaphoresis, fatigue and fever  HENT: Negative for congestion, ear pain, sore throat and trouble swallowing  Eyes: Negative for pain  Respiratory: Negative for cough, shortness of breath and wheezing  Cardiovascular: Negative for chest pain and palpitations     Gastrointestinal: Negative for abdominal distention, abdominal pain, constipation (noted in HPI), nausea and vomiting  Genitourinary: Negative for dysuria  Musculoskeletal: Negative for gait problem  Skin: Negative for rash and wound (surgical sites from laprascopy with glue intact)  Neurological: Negative for dizziness, syncope, weakness and light-headedness  Psychiatric/Behavioral: The patient is not nervous/anxious  Past Medical History:   Diagnosis Date    Anemia     Bariatric surgery status     CPAP (continuous positive airway pressure) dependence     Depression     History of anxiety     Left knee pain     "occas"    Low back pain     "mayra if standing for a while"    Morbid obesity (HCC)     Motion sickness     PONV (postoperative nausea and vomiting)     Postgastrectomy malabsorption     Prediabetes     Right ankle pain     " occas"    Shortness of breath     "exertional"    Sleep apnea     Mild    Walks frequently     for exercise    Wears glasses          Current Outpatient Prescriptions:     buPROPion (WELLBUTRIN XL) 150 mg 24 hr tablet, Take 1 tablet (150 mg total) by mouth 2 (two) times a day, Disp: 180 tablet, Rfl: 1    CALCIUM PO, Take 1 each by mouth 3 (three) times a day  , Disp: , Rfl:     Multiple Vitamin (MULTIVITAMIN) tablet, Take 1 tablet by mouth daily, Disp: , Rfl:     omeprazole (PriLOSEC) 20 mg delayed release capsule, Take 1 capsule (20 mg total) by mouth daily (Patient taking differently: Take 20 mg by mouth daily as needed  ), Disp: 90 capsule, Rfl: 3    No Known Allergies    Social History   Past Surgical History:   Procedure Laterality Date    ANKLE SURGERY Bilateral     Right with screws and plate implant   Left ACL repair Resolved: [address] Left     screws implanted    BARIATRIC SURGERY  05/08/2018    KNEE CARTILAGE SURGERY      right    KNEE SURGERY Left     SD EGD TRANSORAL BIOPSY SINGLE/MULTIPLE N/A 3/21/2018    Procedure: ESOPHAGOGASTRODUODENOSCOPY (EGD) with bx;  Surgeon: Jean-Claude Dsouza Anna Merrill MD;  Location: [facility]; Service: Bariatrics    MI LAP, ELIN RESTRICT PROC, LONGITUDINAL GASTRECTOMY N/A 5/8/2018    Procedure: LAPAROSCOPIC SLEEVE GASTRECTOMY WITH ROBOTICS;  Surgeon: Tammy Fried MD;  Location: [facility];  Service: Bariatrics    REDUCTION MAMMAPLASTY Bilateral     Resolved: 2004    WISDOM TOOTH EXTRACTION       Family History   Problem Relation Age of Onset    Hypertension Mother     Heart attack Mother     Diabetes type II Mother         Mellitus    Hypertension Father     Diabetes type II Father         Mellitus       Objective:  /78 (BP Location: Left arm, Patient Position: Sitting, Cuff Size: Adult)   Pulse (!) 107   Temp 97 5 °F (36 4 °C) (Tympanic)   Ht 5' 2 6" (1 59 m)   Wt 77 5 kg (170 lb 12 8 oz)   SpO2 99%   BMI 30 65 kg/m²        Physical Exam   Constitutional: She is oriented to person, place, and time  She appears well-developed and well-nourished  No distress  HENT:   Head: Normocephalic and atraumatic  Right Ear: External ear normal    Left Ear: External ear normal    Mouth/Throat: Oropharynx is clear and moist    Eyes: Pupils are equal, round, and reactive to light  Conjunctivae are normal  No scleral icterus  Neck: Normal range of motion  Neck supple  No thyromegaly present  Cardiovascular: Normal rate, regular rhythm and normal heart sounds  Pulmonary/Chest: Effort normal and breath sounds normal  No respiratory distress  Abdominal: Soft  Musculoskeletal: Normal range of motion  She exhibits no edema  Neurological: She is alert and oriented to person, place, and time  Skin: Skin is warm and dry  Psychiatric: She has a normal mood and affect  Her behavior is normal  Judgment and thought content normal    Nursing note and vitals reviewed  No results found for this or any previous visit (from the past 672 hour(s))

## 2019-05-15 ENCOUNTER — LAB (OUTPATIENT)
Dept: LAB | Age: 44
End: 2019-05-15
Payer: COMMERCIAL

## 2019-05-15 DIAGNOSIS — E78.6 LOW HDL (UNDER 40): ICD-10-CM

## 2019-05-15 DIAGNOSIS — Z20.1 CONTACT WITH AND (SUSPECTED) EXPOSURE TO TUBERCULOSIS: ICD-10-CM

## 2019-05-15 DIAGNOSIS — K91.2 POSTSURGICAL MALABSORPTION: ICD-10-CM

## 2019-05-15 DIAGNOSIS — Z98.84 BARIATRIC SURGERY STATUS: ICD-10-CM

## 2019-05-15 DIAGNOSIS — E66.9 OBESITY, CLASS I, BMI 30-34.9: ICD-10-CM

## 2019-05-15 LAB
25(OH)D3 SERPL-MCNC: 19.9 NG/ML (ref 30–100)
ALBUMIN SERPL BCP-MCNC: 3.8 G/DL (ref 3.5–5)
ALP SERPL-CCNC: 67 U/L (ref 46–116)
ALT SERPL W P-5'-P-CCNC: 22 U/L (ref 12–78)
ANION GAP SERPL CALCULATED.3IONS-SCNC: 7 MMOL/L (ref 4–13)
AST SERPL W P-5'-P-CCNC: 14 U/L (ref 5–45)
BILIRUB SERPL-MCNC: 0.65 MG/DL (ref 0.2–1)
BUN SERPL-MCNC: 11 MG/DL (ref 5–25)
CALCIUM SERPL-MCNC: 8.6 MG/DL (ref 8.3–10.1)
CHLORIDE SERPL-SCNC: 107 MMOL/L (ref 100–108)
CHOLEST SERPL-MCNC: 226 MG/DL (ref 50–200)
CO2 SERPL-SCNC: 27 MMOL/L (ref 21–32)
CREAT SERPL-MCNC: 0.81 MG/DL (ref 0.6–1.3)
ERYTHROCYTE [DISTWIDTH] IN BLOOD BY AUTOMATED COUNT: 14 % (ref 11.6–15.1)
FERRITIN SERPL-MCNC: 4 NG/ML (ref 8–388)
FOLATE SERPL-MCNC: >20 NG/ML (ref 3.1–17.5)
GFR SERPL CREATININE-BSD FRML MDRD: 90 ML/MIN/1.73SQ M
GLUCOSE P FAST SERPL-MCNC: 81 MG/DL (ref 65–99)
HCT VFR BLD AUTO: 38.2 % (ref 34.8–46.1)
HDLC SERPL-MCNC: 65 MG/DL (ref 40–60)
HGB BLD-MCNC: 11.9 G/DL (ref 11.5–15.4)
IRON SATN MFR SERPL: 9 %
IRON SERPL-MCNC: 45 UG/DL (ref 50–170)
LDLC SERPL CALC-MCNC: 144 MG/DL (ref 0–100)
MCH RBC QN AUTO: 24.9 PG (ref 26.8–34.3)
MCHC RBC AUTO-ENTMCNC: 31.2 G/DL (ref 31.4–37.4)
MCV RBC AUTO: 80 FL (ref 82–98)
NONHDLC SERPL-MCNC: 161 MG/DL
PLATELET # BLD AUTO: 414 THOUSANDS/UL (ref 149–390)
PMV BLD AUTO: 10.4 FL (ref 8.9–12.7)
POTASSIUM SERPL-SCNC: 4 MMOL/L (ref 3.5–5.3)
PROT SERPL-MCNC: 7.1 G/DL (ref 6.4–8.2)
PTH-INTACT SERPL-MCNC: 71.9 PG/ML (ref 18.4–80.1)
RBC # BLD AUTO: 4.78 MILLION/UL (ref 3.81–5.12)
SODIUM SERPL-SCNC: 141 MMOL/L (ref 136–145)
TIBC SERPL-MCNC: 502 UG/DL (ref 250–450)
TRIGL SERPL-MCNC: 85 MG/DL
VIT B12 SERPL-MCNC: 402 PG/ML (ref 100–900)
WBC # BLD AUTO: 7.45 THOUSAND/UL (ref 4.31–10.16)

## 2019-05-15 PROCEDURE — 82525 ASSAY OF COPPER: CPT

## 2019-05-15 PROCEDURE — 82728 ASSAY OF FERRITIN: CPT

## 2019-05-15 PROCEDURE — 80061 LIPID PANEL: CPT

## 2019-05-15 PROCEDURE — 82746 ASSAY OF FOLIC ACID SERUM: CPT

## 2019-05-15 PROCEDURE — 84590 ASSAY OF VITAMIN A: CPT

## 2019-05-15 PROCEDURE — 84425 ASSAY OF VITAMIN B-1: CPT

## 2019-05-15 PROCEDURE — 80053 COMPREHEN METABOLIC PANEL: CPT

## 2019-05-15 PROCEDURE — 85027 COMPLETE CBC AUTOMATED: CPT

## 2019-05-15 PROCEDURE — 82607 VITAMIN B-12: CPT

## 2019-05-15 PROCEDURE — 84630 ASSAY OF ZINC: CPT

## 2019-05-15 PROCEDURE — 83550 IRON BINDING TEST: CPT

## 2019-05-15 PROCEDURE — 83540 ASSAY OF IRON: CPT

## 2019-05-15 PROCEDURE — 83970 ASSAY OF PARATHORMONE: CPT

## 2019-05-15 PROCEDURE — 82306 VITAMIN D 25 HYDROXY: CPT

## 2019-05-15 PROCEDURE — 36415 COLL VENOUS BLD VENIPUNCTURE: CPT

## 2019-05-17 LAB
COPPER SERPL-MCNC: 120 UG/DL (ref 72–166)
ZINC SERPL-MCNC: 87 UG/DL (ref 56–134)

## 2019-05-19 LAB
VIT A SERPL-MCNC: 42.7 UG/DL (ref 20.1–62)
VIT B1 BLD-SCNC: 140.4 NMOL/L (ref 66.5–200)

## 2019-05-22 ENCOUNTER — OFFICE VISIT (OUTPATIENT)
Dept: INTERNAL MEDICINE CLINIC | Age: 44
End: 2019-05-22
Payer: COMMERCIAL

## 2019-05-22 VITALS
WEIGHT: 170.8 LBS | HEART RATE: 66 BPM | SYSTOLIC BLOOD PRESSURE: 112 MMHG | OXYGEN SATURATION: 96 % | DIASTOLIC BLOOD PRESSURE: 80 MMHG | TEMPERATURE: 97.3 F | BODY MASS INDEX: 30.65 KG/M2

## 2019-05-22 DIAGNOSIS — Z12.31 ENCOUNTER FOR SCREENING MAMMOGRAM FOR BREAST CANCER: Primary | ICD-10-CM

## 2019-05-22 PROBLEM — K91.2 POSTSURGICAL MALABSORPTION: Status: RESOLVED | Noted: 2018-08-29 | Resolved: 2019-05-22

## 2019-05-22 PROBLEM — G47.33 OBSTRUCTIVE SLEEP APNEA, ADULT: Status: RESOLVED | Noted: 2017-11-29 | Resolved: 2019-05-22

## 2019-05-22 PROBLEM — S83.249A TEAR OF MEDIAL MENISCUS OF KNEE: Status: RESOLVED | Noted: 2017-04-17 | Resolved: 2019-05-22

## 2019-05-22 PROCEDURE — 99214 OFFICE O/P EST MOD 30 MIN: CPT | Performed by: INTERNAL MEDICINE

## 2019-05-29 ENCOUNTER — OFFICE VISIT (OUTPATIENT)
Dept: BARIATRICS | Facility: CLINIC | Age: 44
End: 2019-05-29
Payer: COMMERCIAL

## 2019-05-29 VITALS
DIASTOLIC BLOOD PRESSURE: 78 MMHG | TEMPERATURE: 97.5 F | RESPIRATION RATE: 16 BRPM | BODY MASS INDEX: 30.03 KG/M2 | SYSTOLIC BLOOD PRESSURE: 110 MMHG | HEIGHT: 63 IN | HEART RATE: 92 BPM | WEIGHT: 169.5 LBS

## 2019-05-29 DIAGNOSIS — K21.9 GASTROESOPHAGEAL REFLUX DISEASE WITHOUT ESOPHAGITIS: ICD-10-CM

## 2019-05-29 DIAGNOSIS — E66.9 OBESITY, CLASS I, BMI 30-34.9: ICD-10-CM

## 2019-05-29 DIAGNOSIS — Z98.84 BARIATRIC SURGERY STATUS: Primary | ICD-10-CM

## 2019-05-29 DIAGNOSIS — K91.2 POSTSURGICAL MALABSORPTION: Chronic | ICD-10-CM

## 2019-05-29 DIAGNOSIS — E78.00 HYPERCHOLESTEROLEMIA: ICD-10-CM

## 2019-05-29 DIAGNOSIS — E55.9 VITAMIN D DEFICIENCY: ICD-10-CM

## 2019-05-29 DIAGNOSIS — E61.1 IRON DEFICIENCY: ICD-10-CM

## 2019-05-29 PROBLEM — E78.6 LOW HDL (UNDER 40): Status: RESOLVED | Noted: 2018-08-29 | Resolved: 2019-05-29

## 2019-05-29 PROCEDURE — 99214 OFFICE O/P EST MOD 30 MIN: CPT | Performed by: PHYSICIAN ASSISTANT

## 2019-05-29 RX ORDER — ERGOCALCIFEROL 1.25 MG/1
50000 CAPSULE ORAL 2 TIMES WEEKLY
Qty: 8 CAPSULE | Refills: 2 | Status: SHIPPED | OUTPATIENT
Start: 2019-05-30 | End: 2019-08-28 | Stop reason: ALTCHOICE

## 2019-05-29 RX ORDER — FERROUS SULFATE 325(65) MG
325 TABLET ORAL
COMMUNITY
End: 2022-02-10 | Stop reason: ALTCHOICE

## 2019-08-07 DIAGNOSIS — F32.A DEPRESSION, UNSPECIFIED DEPRESSION TYPE: ICD-10-CM

## 2019-08-07 RX ORDER — BUPROPION HYDROCHLORIDE 150 MG/1
TABLET ORAL
Qty: 180 TABLET | Refills: 1 | Status: SHIPPED | OUTPATIENT
Start: 2019-08-07 | End: 2019-10-24 | Stop reason: SDUPTHER

## 2019-08-21 DIAGNOSIS — E55.9 VITAMIN D DEFICIENCY: ICD-10-CM

## 2019-08-21 DIAGNOSIS — Z98.84 BARIATRIC SURGERY STATUS: ICD-10-CM

## 2019-08-21 DIAGNOSIS — K91.2 POSTSURGICAL MALABSORPTION: Chronic | ICD-10-CM

## 2019-08-21 RX ORDER — ERGOCALCIFEROL 1.25 MG/1
50000 CAPSULE ORAL 2 TIMES WEEKLY
Qty: 8 CAPSULE | Refills: 2 | OUTPATIENT
Start: 2019-08-22

## 2019-08-21 NOTE — TELEPHONE ENCOUNTER
If patient completed the vitamin D prescription/refills she does not need more-needs to add a daily 2000 IU vitamin D3 now in addition to her current supplements and get repeat vitamin D lab as advised

## 2019-08-22 ENCOUNTER — TRANSCRIBE ORDERS (OUTPATIENT)
Dept: ADMINISTRATIVE | Age: 44
End: 2019-08-22

## 2019-08-22 ENCOUNTER — APPOINTMENT (OUTPATIENT)
Dept: LAB | Age: 44
End: 2019-08-22
Payer: COMMERCIAL

## 2019-08-22 DIAGNOSIS — E61.1 IRON DEFICIENCY: ICD-10-CM

## 2019-08-22 DIAGNOSIS — K91.2 POSTSURGICAL MALABSORPTION: Chronic | ICD-10-CM

## 2019-08-22 DIAGNOSIS — Z98.84 BARIATRIC SURGERY STATUS: ICD-10-CM

## 2019-08-22 DIAGNOSIS — E55.9 VITAMIN D DEFICIENCY: ICD-10-CM

## 2019-08-22 LAB
25(OH)D3 SERPL-MCNC: 123 NG/ML (ref 30–100)
ERYTHROCYTE [DISTWIDTH] IN BLOOD BY AUTOMATED COUNT: 14.9 % (ref 11.6–15.1)
FERRITIN SERPL-MCNC: 9 NG/ML (ref 8–388)
HCT VFR BLD AUTO: 39.2 % (ref 34.8–46.1)
HGB BLD-MCNC: 12.5 G/DL (ref 11.5–15.4)
IRON SATN MFR SERPL: 25 %
IRON SERPL-MCNC: 111 UG/DL (ref 50–170)
MCH RBC QN AUTO: 26.3 PG (ref 26.8–34.3)
MCHC RBC AUTO-ENTMCNC: 31.9 G/DL (ref 31.4–37.4)
MCV RBC AUTO: 83 FL (ref 82–98)
PLATELET # BLD AUTO: 370 THOUSANDS/UL (ref 149–390)
PMV BLD AUTO: 10.3 FL (ref 8.9–12.7)
RBC # BLD AUTO: 4.75 MILLION/UL (ref 3.81–5.12)
TIBC SERPL-MCNC: 446 UG/DL (ref 250–450)
WBC # BLD AUTO: 7.55 THOUSAND/UL (ref 4.31–10.16)

## 2019-08-22 PROCEDURE — 85027 COMPLETE CBC AUTOMATED: CPT

## 2019-08-22 PROCEDURE — 82728 ASSAY OF FERRITIN: CPT

## 2019-08-22 PROCEDURE — 83540 ASSAY OF IRON: CPT

## 2019-08-22 PROCEDURE — 82306 VITAMIN D 25 HYDROXY: CPT

## 2019-08-22 PROCEDURE — 36415 COLL VENOUS BLD VENIPUNCTURE: CPT

## 2019-08-22 PROCEDURE — 83550 IRON BINDING TEST: CPT

## 2019-08-28 DIAGNOSIS — E67.3 HYPERVITAMINOSIS D: Primary | ICD-10-CM

## 2019-10-10 ENCOUNTER — HOSPITAL ENCOUNTER (OUTPATIENT)
Dept: MAMMOGRAPHY | Facility: HOSPITAL | Age: 44
Discharge: HOME | End: 2019-10-10
Payer: COMMERCIAL

## 2019-10-10 VITALS — WEIGHT: 169 LBS | BODY MASS INDEX: 29.95 KG/M2 | HEIGHT: 63 IN

## 2019-10-10 DIAGNOSIS — Z12.31 ENCOUNTER FOR SCREENING MAMMOGRAM FOR BREAST CANCER: ICD-10-CM

## 2019-10-10 PROCEDURE — 77067 SCR MAMMO BI INCL CAD: CPT

## 2019-10-24 ENCOUNTER — OFFICE VISIT (OUTPATIENT)
Dept: INTERNAL MEDICINE CLINIC | Facility: CLINIC | Age: 44
End: 2019-10-24
Payer: COMMERCIAL

## 2019-10-24 VITALS
HEART RATE: 64 BPM | SYSTOLIC BLOOD PRESSURE: 124 MMHG | WEIGHT: 180.6 LBS | DIASTOLIC BLOOD PRESSURE: 80 MMHG | TEMPERATURE: 98.1 F | BODY MASS INDEX: 32 KG/M2 | OXYGEN SATURATION: 99 % | HEIGHT: 63 IN

## 2019-10-24 DIAGNOSIS — E61.1 IRON DEFICIENCY: ICD-10-CM

## 2019-10-24 DIAGNOSIS — F32.A DEPRESSION, UNSPECIFIED DEPRESSION TYPE: ICD-10-CM

## 2019-10-24 DIAGNOSIS — Z98.84 BARIATRIC SURGERY STATUS: Chronic | ICD-10-CM

## 2019-10-24 DIAGNOSIS — K91.2 POSTSURGICAL MALABSORPTION: Primary | Chronic | ICD-10-CM

## 2019-10-24 PROCEDURE — 99214 OFFICE O/P EST MOD 30 MIN: CPT | Performed by: INTERNAL MEDICINE

## 2019-10-24 PROCEDURE — 3008F BODY MASS INDEX DOCD: CPT | Performed by: INTERNAL MEDICINE

## 2019-10-24 RX ORDER — BUPROPION HYDROCHLORIDE 150 MG/1
150 TABLET ORAL 2 TIMES DAILY
Qty: 180 TABLET | Refills: 1 | Status: SHIPPED | OUTPATIENT
Start: 2019-10-24 | End: 2020-05-18

## 2019-10-24 NOTE — PROGRESS NOTES
Assessment/Plan:  Depression and anxiety is doing very well patient is here for the follow-up for the depression and anxiety and also she is status post bariatric surgery she is doing well overall followed up by the bariatric surgery regularly will continue with the same medication no changes I will get the lipid panel and also hemoglobin A1c for the next visit will see her back in about 4 months unless any problems  Problem List Items Addressed This Visit        Digestive    Postsurgical malabsorption - Primary (Chronic)    Relevant Orders    Lipid panel    Hemoglobin A1C       Other    Bariatric surgery status (Chronic)    Relevant Orders    Lipid panel    Hemoglobin A1C    Depression    Relevant Medications    buPROPion (WELLBUTRIN XL) 150 mg 24 hr tablet    Iron deficiency          BMI Counseling: Body mass index is 31 99 kg/m²  Discussed the patient's BMI with she  The BMI is above normal  Nutrition recommendations include reducing portion sizes, decreasing overall calorie intake, 3-5 servings of fruits/vegetables daily, reducing fast food intake, consuming healthier snacks and decreasing soda and/or juice intake  Exercise recommendations include exercising 3-5 times per week  M*Modal software was used to dictate this note  It may contain errors with dictating incorrect words or incorrect spelling  Please contact the provider directly with any questions  Subjective:   No chief complaint on file  Patient ID: Dl Bonds is a 45 y o  female  Patient presents today for a 4 month follow up  Patient has no complaints or issues at this time  She had mammogram done on 10/10 with no abnormal findings  She is no longer using omeprazole and states that her GERD is under control with diet and avoidance of trigger foods  She is still taking Wellbutrin as prescribed without issue and states that her depression is under control with medication  SIGECAPS- all negative   She had gastric bypass bariatric surgery in 2018, happy with results of the surgery and no issues at this time and taking vitamins and iron as prescribed  The following portions of the patient's history were reviewed and updated as appropriate: allergies, current medications, past family history, past medical history, past social history, past surgical history and problem list     Review of Systems   Constitutional: Negative for appetite change, chills, fatigue, fever and unexpected weight change  HENT: Negative for hearing loss and voice change  Eyes: Negative for visual disturbance  Respiratory: Negative for chest tightness and shortness of breath  Cardiovascular: Negative for chest pain and palpitations  Gastrointestinal: Negative for abdominal pain, diarrhea, nausea and vomiting  Genitourinary: Negative for difficulty urinating, dysuria, frequency and urgency  Musculoskeletal: Negative for arthralgias and myalgias  Skin: Negative for rash and wound  Neurological: Negative for dizziness, light-headedness and headaches  Psychiatric/Behavioral: Negative for decreased concentration, sleep disturbance and suicidal ideas  The patient is not nervous/anxious            Past Medical History:   Diagnosis Date    Anemia     Bariatric surgery status     CPAP (continuous positive airway pressure) dependence     Depression     History of anxiety     Left knee pain     "occas"    Low back pain     "mayra if standing for a while"    Morbid obesity (HCC)     Motion sickness     PONV (postoperative nausea and vomiting)     Postgastrectomy malabsorption     Prediabetes     Right ankle pain     " occas"    Shortness of breath     "exertional"    Sleep apnea     Mild    Walks frequently     for exercise    Wears glasses          Current Outpatient Medications:     buPROPion (WELLBUTRIN XL) 150 mg 24 hr tablet, Take 1 tablet (150 mg total) by mouth 2 (two) times a day, Disp: 180 tablet, Rfl: 1    CALCIUM PO, Take 1 each by mouth 3 (three) times a day  , Disp: , Rfl:     ferrous sulfate 325 (65 Fe) mg tablet, Take 325 mg by mouth daily with breakfast, Disp: , Rfl:     Multiple Vitamin (MULTIVITAMIN) tablet, Take 1 tablet by mouth daily, Disp: , Rfl:     No Known Allergies    Social History   Past Surgical History:   Procedure Laterality Date    ANKLE SURGERY Bilateral     Right with screws and plate implant  Left ACL repair Resolved: [address]     screws implanted    BARIATRIC SURGERY  05/08/2018    KNEE CARTILAGE SURGERY      right    KNEE SURGERY Left     SC EGD TRANSORAL BIOPSY SINGLE/MULTIPLE N/A 3/21/2018    Procedure: ESOPHAGOGASTRODUODENOSCOPY (EGD) with bx;  Surgeon: Rod Montalvo MD;  Location: [facility]; Service: Bariatrics    SC LAP, ELIN RESTRICT PROC, LONGITUDINAL GASTRECTOMY N/A 5/8/2018    Procedure: LAPAROSCOPIC SLEEVE GASTRECTOMY WITH ROBOTICS;  Surgeon: Rod Montalvo MD;  Location[facility];  Service: Bariatrics    REDUCTION MAMMAPLASTY Bilateral     Resolved: 2004    WISDOM TOOTH EXTRACTION       Family History   Problem Relation Age of Onset    Hypertension Mother     Heart attack Mother     Diabetes type II Mother         Mellitus    Hypertension Father     Diabetes type II Father         Mellitus    No Known Problems Sister     No Known Problems Maternal Grandmother     No Known Problems Maternal Grandfather     No Known Problems Paternal Grandmother     No Known Problems Paternal Grandfather     No Known Problems Sister        Objective:  /80 (BP Location: Left arm, Patient Position: Sitting, Cuff Size: Adult)   Pulse 64   Temp 98 1 °F (36 7 °C) (Oral)   Ht 5' 3" (1 6 m)   Wt 81 9 kg (180 lb 9 6 oz)   LMP 10/06/2019 (Approximate)   SpO2 99%   BMI 31 99 kg/m²      Physical Exam   Constitutional: She is oriented to person, place, and time  She appears well-developed and well-nourished     HENT:   Right Ear: External ear normal  Mouth/Throat: Oropharynx is clear and moist    Eyes: Pupils are equal, round, and reactive to light  Conjunctivae and EOM are normal    Neck: Normal range of motion  No JVD present  No thyromegaly present  Cardiovascular: Normal rate, regular rhythm, normal heart sounds and intact distal pulses  Pulmonary/Chest: Breath sounds normal    Abdominal: Soft  Bowel sounds are normal    Musculoskeletal: Normal range of motion  Lymphadenopathy:     She has no cervical adenopathy  Neurological: She is alert and oriented to person, place, and time  She has normal reflexes  Skin: Skin is dry  Psychiatric: She has a normal mood and affect   Her behavior is normal  Judgment and thought content normal

## 2019-10-25 ENCOUNTER — APPOINTMENT (OUTPATIENT)
Dept: LAB | Age: 44
End: 2019-10-25

## 2019-10-25 ENCOUNTER — OCCMED (OUTPATIENT)
Dept: URGENT CARE | Age: 44
End: 2019-10-25

## 2019-10-25 DIAGNOSIS — Z13.9 SCREENING FOR UNSPECIFIED CONDITION: ICD-10-CM

## 2019-10-25 DIAGNOSIS — Z13.9 SCREENING FOR UNSPECIFIED CONDITION: Primary | ICD-10-CM

## 2019-10-25 LAB
CHOLEST SERPL-MCNC: 240 MG/DL (ref 50–200)
GLUCOSE P FAST SERPL-MCNC: 83 MG/DL (ref 65–99)
HDLC SERPL-MCNC: 67 MG/DL
LDLC SERPL CALC-MCNC: 158 MG/DL (ref 0–100)
NONHDLC SERPL-MCNC: 173 MG/DL
TRIGL SERPL-MCNC: 74 MG/DL

## 2019-10-25 PROCEDURE — 80061 LIPID PANEL: CPT

## 2019-10-25 PROCEDURE — 82947 ASSAY GLUCOSE BLOOD QUANT: CPT

## 2019-10-25 PROCEDURE — 36415 COLL VENOUS BLD VENIPUNCTURE: CPT

## 2019-10-25 PROCEDURE — 80323 ALKALOIDS NOS: CPT

## 2019-10-30 LAB
COTININE SERPL-MCNC: NORMAL NG/ML
NICOTINE SERPL-MCNC: NORMAL NG/ML

## 2019-12-03 ENCOUNTER — OFFICE VISIT (OUTPATIENT)
Dept: URGENT CARE | Age: 44
End: 2019-12-03
Payer: COMMERCIAL

## 2019-12-03 VITALS
TEMPERATURE: 97.3 F | SYSTOLIC BLOOD PRESSURE: 127 MMHG | DIASTOLIC BLOOD PRESSURE: 64 MMHG | HEIGHT: 62 IN | HEART RATE: 76 BPM | RESPIRATION RATE: 18 BRPM | WEIGHT: 180 LBS | OXYGEN SATURATION: 99 % | BODY MASS INDEX: 33.13 KG/M2

## 2019-12-03 DIAGNOSIS — J01.00 ACUTE MAXILLARY SINUSITIS, RECURRENCE NOT SPECIFIED: Primary | ICD-10-CM

## 2019-12-03 PROCEDURE — 99213 OFFICE O/P EST LOW 20 MIN: CPT | Performed by: PHYSICIAN ASSISTANT

## 2019-12-03 RX ORDER — AMOXICILLIN AND CLAVULANATE POTASSIUM 875; 125 MG/1; MG/1
1 TABLET, FILM COATED ORAL EVERY 12 HOURS SCHEDULED
Qty: 14 TABLET | Refills: 0 | Status: SHIPPED | OUTPATIENT
Start: 2019-12-03 | End: 2019-12-10

## 2019-12-03 RX ORDER — LORATADINE 10 MG/1
10 TABLET ORAL DAILY
Qty: 30 TABLET | Refills: 0 | Status: SHIPPED | OUTPATIENT
Start: 2019-12-03 | End: 2021-01-14

## 2019-12-03 RX ORDER — FLUTICASONE PROPIONATE 50 MCG
1 SPRAY, SUSPENSION (ML) NASAL DAILY
Qty: 16 G | Refills: 0 | Status: SHIPPED | OUTPATIENT
Start: 2019-12-03 | End: 2021-01-14

## 2019-12-03 NOTE — PATIENT INSTRUCTIONS
Take medications as directed  Drink plenty of fluids  Follow up with family doctor this week  Go to ER immediately if new or worsening symptoms occur  Sinusitis   WHAT YOU NEED TO KNOW:   Sinusitis is inflammation or infection of your sinuses  It is most often caused by a virus  Acute sinusitis may last up to 12 weeks  Chronic sinusitis lasts longer than 12 weeks  Recurrent sinusitis means you have 4 or more times in 1 year  DISCHARGE INSTRUCTIONS:   Return to the emergency department if:   · Your eye and eyelid are red, swollen, and painful  · You cannot open your eye  · You have vision changes, such as double vision  · Your eyeball bulges out or you cannot move your eye  · You are more sleepy than normal, or you notice changes in your ability to think, move, or talk  · You have a stiff neck, a fever, or a bad headache  · You have swelling of your forehead or scalp  Contact your healthcare provider if:   · Your symptoms do not improve after 3 days  · Your symptoms do not go away after 10 days  · You have nausea and are vomiting  · Your nose is bleeding  · You have questions or concerns about your condition or care  Medicines: Your symptoms may go away on their own  Your healthcare provider may recommend watchful waiting for up to 10 days before starting antibiotics  You may  need any of the following:  · Acetaminophen  decreases pain and fever  It is available without a doctor's order  Ask how much to take and how often to take it  Follow directions  Read the labels of all other medicines you are using to see if they also contain acetaminophen, or ask your doctor or pharmacist  Acetaminophen can cause liver damage if not taken correctly  Do not use more than 4 grams (4,000 milligrams) total of acetaminophen in one day  · NSAIDs , such as ibuprofen, help decrease swelling, pain, and fever  This medicine is available with or without a doctor's order   NSAIDs can cause stomach bleeding or kidney problems in certain people  If you take blood thinner medicine, always ask your healthcare provider if NSAIDs are safe for you  Always read the medicine label and follow directions  · Nasal steroid sprays  may help decrease inflammation in your nose and sinuses  · Decongestants  help reduce swelling and drain mucus in the nose and sinuses  They may help you breathe easier  · Antihistamines  help dry mucus in the nose and relieve sneezing  · Antibiotics  help treat or prevent a bacterial infection  · Take your medicine as directed  Contact your healthcare provider if you think your medicine is not helping or if you have side effects  Tell him or her if you are allergic to any medicine  Keep a list of the medicines, vitamins, and herbs you take  Include the amounts, and when and why you take them  Bring the list or the pill bottles to follow-up visits  Carry your medicine list with you in case of an emergency  Self-care:   · Rinse your sinuses  Use a sinus rinse device to rinse your nasal passages with a saline (salt water) solution or distilled water  Do not use tap water  This will help thin the mucus in your nose and rinse away pollen and dirt  It will also help reduce swelling so you can breathe normally  Ask your healthcare provider how often to do this  · Breathe in steam   Heat a bowl of water until you see steam  Lean over the bowl and make a tent over your head with a large towel  Breathe deeply for about 20 minutes  Be careful not to get too close to the steam or burn yourself  Do this 3 times a day  You can also breathe deeply when you take a hot shower  · Sleep with your head elevated  Place an extra pillow under your head before you go to sleep to help your sinuses drain  · Drink liquids as directed  Ask your healthcare provider how much liquid to drink each day and which liquids are best for you   Liquids will thin the mucus in your nose and help it drain  Avoid drinks that contain alcohol or caffeine  · Do not smoke, and avoid secondhand smoke  Nicotine and other chemicals in cigarettes and cigars can make your symptoms worse  Ask your healthcare provider for information if you currently smoke and need help to quit  E-cigarettes or smokeless tobacco still contain nicotine  Talk to your healthcare provider before you use these products  Prevent the spread of germs that cause sinusitis:  Wash your hands often with soap and water  Wash your hands after you use the bathroom, change a child's diaper, or sneeze  Wash your hands before you prepare or eat food  Follow up with your healthcare provider as directed: You may be referred to an ear, nose, and throat specialist  Write down your questions so you remember to ask them during your visits  © 2017 [employer] Information is for End User's use only and may not be sold, redistributed or otherwise used for commercial purposes  All illustrations and images included in CareNotes® are the copyrighted property of [employer]  or [employer]  The above information is an  only  It is not intended as medical advice for individual conditions or treatments  Talk to your doctor, nurse or pharmacist before following any medical regimen to see if it is safe and effective for you

## 2019-12-03 NOTE — PROGRESS NOTES
[facility]        NAME: Joann Barakat is a 45 y o  female  : 1975    MRN: [id number]  DATE: December 3, 2019  TIME: 4:40 PM    Assessment and Plan   Acute maxillary sinusitis, recurrence not specified [J01 00]  1  Acute maxillary sinusitis, recurrence not specified  fluticasone (FLONASE) 50 mcg/act nasal spray    loratadine (CLARITIN) 10 mg tablet    amoxicillin-clavulanate (AUGMENTIN) 875-125 mg per tablet         Patient Instructions       Continue to monitor symptoms  If new or worsening symptoms develop, go immediately to Er  Drink plenty of fluids  Follow up with Family Doctor this week  Chief Complaint     Chief Complaint   Patient presents with    Cold Like Symptoms     Pt c/o sinus pain/pressure, headache, post-nasal drip, and cough for 2 5 weeks  Denies fever  History of Present Illness       Sinusitis   This is a new problem  Episode onset: 2 5 weeks  Evolving from cough to mostly sinus congestion and pressure  The problem has been gradually worsening since onset  There has been no fever  The pain is moderate  Associated symptoms include congestion, coughing, sinus pressure, sneezing and a sore throat  Pertinent negatives include no chills, diaphoresis, ear pain, headaches, hoarse voice, neck pain or shortness of breath  (Pain in L cheek radiating into L upper teeth) Past treatments include acetaminophen  The treatment provided mild relief  Review of Systems   Review of Systems   Constitutional: Negative for chills, diaphoresis, fatigue and fever  HENT: Positive for congestion, postnasal drip, sinus pressure, sinus pain, sneezing and sore throat  Negative for ear pain, hoarse voice, rhinorrhea and voice change  Eyes: Negative  Respiratory: Positive for cough  Negative for chest tightness, shortness of breath and wheezing  Cardiovascular: Negative for chest pain and palpitations     Gastrointestinal: Negative for abdominal pain, constipation, diarrhea, nausea and vomiting  Endocrine: Negative  Genitourinary: Negative for dysuria  Musculoskeletal: Negative for back pain, myalgias and neck pain  Skin: Negative for pallor and rash  Allergic/Immunologic: Negative  Neurological: Negative for dizziness, syncope and headaches  Hematological: Negative  Psychiatric/Behavioral: Negative            Current Medications       Current Outpatient Medications:     amoxicillin-clavulanate (AUGMENTIN) 875-125 mg per tablet, Take 1 tablet by mouth every 12 (twelve) hours for 7 days, Disp: 14 tablet, Rfl: 0    buPROPion (WELLBUTRIN XL) 150 mg 24 hr tablet, Take 1 tablet (150 mg total) by mouth 2 (two) times a day, Disp: 180 tablet, Rfl: 1    CALCIUM PO, Take 1 each by mouth 3 (three) times a day  , Disp: , Rfl:     ferrous sulfate 325 (65 Fe) mg tablet, Take 325 mg by mouth daily with breakfast, Disp: , Rfl:     fluticasone (FLONASE) 50 mcg/act nasal spray, 1 spray into each nostril daily, Disp: 16 g, Rfl: 0    loratadine (CLARITIN) 10 mg tablet, Take 1 tablet (10 mg total) by mouth daily, Disp: 30 tablet, Rfl: 0    Multiple Vitamin (MULTIVITAMIN) tablet, Take 1 tablet by mouth daily, Disp: , Rfl:     Current Allergies     Allergies as of 12/03/2019    (No Known Allergies)            The following portions of the patient's history were reviewed and updated as appropriate: allergies, current medications, past family history, past medical history, past social history, past surgical history and problem list      Past Medical History:   Diagnosis Date    Anemia     Bariatric surgery status     CPAP (continuous positive airway pressure) dependence     Depression     History of anxiety     Left knee pain     "occas"    Low back pain     "mayra if standing for a while"    Morbid obesity (HCC)     Motion sickness     PONV (postoperative nausea and vomiting)     Postgastrectomy malabsorption     Prediabetes     Right ankle pain     " occas"    Shortness of breath     "exertional"    Sleep apnea     Mild    Walks frequently     for exercise    Wears glasses        Past Surgical History:   Procedure Laterality Date    ANKLE SURGERY Bilateral     Right with screws and plate implant  Left ACL repair Resolved: [address] Left     screws implanted    BARIATRIC SURGERY  05/08/2018    KNEE CARTILAGE SURGERY      right    KNEE SURGERY Left     ND EGD TRANSORAL BIOPSY SINGLE/MULTIPLE N/A 3/21/2018    Procedure: ESOPHAGOGASTRODUODENOSCOPY (EGD) with bx;  Surgeon: Tran Armenta MD;  Location: [facility]; Service: Bariatrics    ND LAP, ELIN RESTRICT PROC, LONGITUDINAL GASTRECTOMY N/A 5/8/2018    Procedure: LAPAROSCOPIC SLEEVE GASTRECTOMY WITH ROBOTICS;  Surgeon: Tran Armenta MD;  Location: [facility];  Service: Bariatrics    REDUCTION MAMMAPLASTY Bilateral     Resolved: 2004    WISDOM TOOTH EXTRACTION         Family History   Problem Relation Age of Onset    Hypertension Mother     Heart attack Mother     Diabetes type II Mother         Mellitus    Hypertension Father     Diabetes type II Father         Mellitus    No Known Problems Sister     No Known Problems Maternal Grandmother     No Known Problems Maternal Grandfather     No Known Problems Paternal Grandmother     No Known Problems Paternal Grandfather     No Known Problems Sister          Medications have been verified  Objective   /64   Pulse 76   Temp (!) 97 3 °F (36 3 °C)   Resp 18   Ht 5' 2" (1 575 m)   Wt 81 6 kg (180 lb)   LMP 12/01/2019 (Approximate)   SpO2 99%   BMI 32 92 kg/m²        Physical Exam     Physical Exam   Constitutional: She appears well-developed and well-nourished  No distress  HENT:   Head: Normocephalic and atraumatic  Right Ear: Hearing, external ear and ear canal normal  Tympanic membrane is bulging  Tympanic membrane is not perforated and not erythematous     Left Ear: Hearing, external ear and ear canal normal  Tympanic membrane is bulging  Tympanic membrane is not perforated and not erythematous  Nose: Mucosal edema present  Right sinus exhibits no maxillary sinus tenderness and no frontal sinus tenderness  Left sinus exhibits maxillary sinus tenderness  Left sinus exhibits no frontal sinus tenderness  Mouth/Throat: Posterior oropharyngeal erythema present  No oropharyngeal exudate or posterior oropharyngeal edema  Eyes: Conjunctivae are normal  Right eye exhibits no discharge  Left eye exhibits no discharge  Neck: Normal range of motion  Neck supple  Cardiovascular: Normal rate, regular rhythm, normal heart sounds and intact distal pulses  Pulmonary/Chest: Effort normal and breath sounds normal  No respiratory distress  She has no wheezes  She has no rales  Lymphadenopathy:     She has no cervical adenopathy  Skin: Skin is warm  Capillary refill takes less than 2 seconds  No rash noted  She is not diaphoretic  No pallor  Nursing note and vitals reviewed

## 2020-02-03 ENCOUNTER — TELEPHONE (OUTPATIENT)
Dept: BARIATRICS | Facility: CLINIC | Age: 45
End: 2020-02-03

## 2020-02-03 NOTE — TELEPHONE ENCOUNTER
called to r/s patients apt due to a change in the providers schedule - left a voicemail and mailing a letter

## 2020-03-20 ENCOUNTER — OFFICE VISIT (OUTPATIENT)
Dept: URGENT CARE | Age: 45
End: 2020-03-20
Payer: COMMERCIAL

## 2020-03-20 VITALS
BODY MASS INDEX: 35.37 KG/M2 | DIASTOLIC BLOOD PRESSURE: 66 MMHG | HEART RATE: 81 BPM | TEMPERATURE: 98 F | HEIGHT: 62 IN | OXYGEN SATURATION: 100 % | RESPIRATION RATE: 17 BRPM | SYSTOLIC BLOOD PRESSURE: 127 MMHG | WEIGHT: 192.2 LBS

## 2020-03-20 DIAGNOSIS — M25.562 ACUTE PAIN OF LEFT KNEE: Primary | ICD-10-CM

## 2020-03-20 PROCEDURE — 99213 OFFICE O/P EST LOW 20 MIN: CPT | Performed by: NURSE PRACTITIONER

## 2020-03-20 RX ORDER — METHYLPREDNISOLONE 4 MG/1
TABLET ORAL
Qty: 21 TABLET | Refills: 0 | Status: SHIPPED | OUTPATIENT
Start: 2020-03-20 | End: 2021-01-14

## 2020-03-20 NOTE — PROGRESS NOTES
[facility]        NAME: Jesus Harper is a 45 y o  female  : 1975    MRN: [id number]  DATE: 2020  TIME: 11:27 AM    Assessment and Plan   Acute pain of left knee [M25 562]  1  Acute pain of left knee  methylPREDNISolone 4 MG tablet therapy pack         Patient Instructions     Patient Instructions   Can take tylenol as needed for pain  Wear ace wrap as directed  Elevate, ice and rest  Can try epsom salt soaks  If you develop any increased pain, redness, swelling, numbness, tingling, decreased range of motion, or any new or concerning symptoms please return or proceed ER  Recommend following up with PCP in 3-5 days  Knee Pain   WHAT YOU NEED TO KNOW:   Knee pain may start suddenly, or it may be a long-term problem  You may have pain on the side, front, or back of your knee  You may have knee stiffness and swelling  You may hear popping sounds or feel like your knee is giving way or locking up as you walk  You may feel pain when you sit, stand, walk, or climb up and down stairs  Knee pain can be caused by conditions such as obesity, inflammation, or strains or tears in ligaments or tendons  DISCHARGE INSTRUCTIONS:   Follow up with your healthcare provider within 24 hours or as directed: You may need follow-up treatments, such as steroid injections to decrease pain  Write down your questions so you remember to ask them during your visits  Self-care:   · Rest  your knee so it can heal  Limit activities that increase your pain  · Ice  can help reduce swelling  Wrap ice in a towel and put it on your knee for as long and as often as directed  · Compression  with a brace or bandage can help reduce swelling  Use a brace or bandage only as directed  · Elevation  helps decrease pain and swelling  Elevate your knee while you are sitting or lying down  Prop your leg on pillows to keep your knee above the level of your heart    Medicines:   · NSAIDs  help decrease swelling and pain or fever  This medicine is available with or without a doctor's order  NSAIDs can cause stomach bleeding or kidney problems in certain people  If you take blood thinner medicine, always ask your healthcare provider if NSAIDs are safe for you  Always read the medicine label and follow directions  · Acetaminophen  decreases pain and fever  It is available without a doctor's order  Ask how much to take and when to take it  Follow directions  Acetaminophen can cause liver damage if not taken correctly  · Take your medicine as directed  Contact your healthcare provider if you think your medicine is not helping or if you have side effects  Tell him or her if you are allergic to any medicine  Keep a list of the medicines, vitamins, and herbs you take  Include the amounts, and when and why you take them  Bring the list or the pill bottles to follow-up visits  Carry your medicine list with you in case of an emergency  Exercise as directed: You may need to see a physical therapist or do recommended exercises to improve movement and decrease your pain  You may be directed to walk, swim, or ride a bike  Follow your exercise plan exactly as directed to avoid further injury  Contact your healthcare provider if:   · You have questions or concerns about your condition or care  Return to the emergency department if:   · Your pain is worse, even after treatment  · You cannot bend or straighten your leg completely  · The swelling around your knee does not go down even with treatment  · Your knee is painful and hot to the touch  © 2017 [address] Information is for End User's use only and may not be sold, redistributed or otherwise used for commercial purposes  All illustrations and images included in CareNotes® are the copyrighted property of [employer]  or [employer]  The above information is an  only   It is not intended as medical advice for individual conditions or treatments  Talk to your doctor, nurse or pharmacist before following any medical regimen to see if it is safe and effective for you  Follow up with PCP in 3-5 days  Proceed to  ER if symptoms worsen  Chief Complaint     Chief Complaint   Patient presents with    Knee Pain     L knee pain, inner knee cap  - swelling, -radiating  Starting after kneeling w/ CPR training  Prior surg's  History of Present Illness       Patient is a 44-year-old female presents with a 2 day history of left knee pain  Patient states the pain began after kneeling on the ground during CPR training  Patient denies any injury or trauma  Patient notes previous history left ACL repair  Patient complaining of medial pain that is worse with movement and better with rest   Has been taking ibuprofen with mild relief  Patient denies any redness or swelling  Denies any numbness, tingling, decreased sensation, or decreased range of motion of left knee  Denies any fever, chills, or body aches  Denies any recent sick contacts or recent travel  Review of Systems   Review of Systems   Constitutional: Negative for chills, diaphoresis, fatigue and fever  HENT: Negative  Respiratory: Negative for cough, chest tightness, shortness of breath, wheezing and stridor  Cardiovascular: Negative for chest pain, palpitations and leg swelling  Gastrointestinal: Negative  Genitourinary: Negative  Musculoskeletal: Positive for arthralgias and gait problem  Negative for back pain, joint swelling, myalgias, neck pain and neck stiffness  Skin: Negative for rash  Neurological: Negative for dizziness, syncope, weakness, light-headedness, numbness and headaches           Current Medications       Current Outpatient Medications:     buPROPion (WELLBUTRIN XL) 150 mg 24 hr tablet, Take 1 tablet (150 mg total) by mouth 2 (two) times a day, Disp: 180 tablet, Rfl: 1    CALCIUM PO, Take 1 each by mouth 3 (three) times a day  , Disp: , Rfl:     ferrous sulfate 325 (65 Fe) mg tablet, Take 325 mg by mouth daily with breakfast, Disp: , Rfl:     Multiple Vitamin (MULTIVITAMIN) tablet, Take 1 tablet by mouth daily, Disp: , Rfl:     fluticasone (FLONASE) 50 mcg/act nasal spray, 1 spray into each nostril daily (Patient not taking: Reported on 3/20/2020), Disp: 16 g, Rfl: 0    loratadine (CLARITIN) 10 mg tablet, Take 1 tablet (10 mg total) by mouth daily (Patient not taking: Reported on 3/20/2020), Disp: 30 tablet, Rfl: 0    methylPREDNISolone 4 MG tablet therapy pack, Use as directed on package, Disp: 21 tablet, Rfl: 0    Current Allergies     Allergies as of 03/20/2020    (No Known Allergies)            The following portions of the patient's history were reviewed and updated as appropriate: allergies, current medications, past family history, past medical history, past social history, past surgical history and problem list      Past Medical History:   Diagnosis Date    Anemia     Bariatric surgery status     CPAP (continuous positive airway pressure) dependence     Depression     History of anxiety     Left knee pain     "occas"    Low back pain     "mayra if standing for a while"    Morbid obesity (HCC)     Motion sickness     PONV (postoperative nausea and vomiting)     Postgastrectomy malabsorption     Prediabetes     Right ankle pain     " occas"    Shortness of breath     "exertional"    Sleep apnea     Mild    Walks frequently     for exercise    Wears glasses        Past Surgical History:   Procedure Laterality Date    ANKLE SURGERY Bilateral     Right with screws and plate implant   Left ACL repair Resolved: [address] Left     screws implanted    BARIATRIC SURGERY  05/08/2018    KNEE CARTILAGE SURGERY      right    KNEE SURGERY Left     [place] EGD TRANSORAL BIOPSY SINGLE/MULTIPLE N/A 3/21/2018    Procedure: ESOPHAGOGASTRODUODENOSCOPY (EGD) with bx;  Surgeon: Stas Adler Thalia Levi MD;  Location: [facility]; Service: Bariatrics    PA LAP, ELIN RESTRICT PROC, LONGITUDINAL GASTRECTOMY N/A 5/8/2018    Procedure: LAPAROSCOPIC SLEEVE GASTRECTOMY WITH ROBOTICS;  Surgeon: Maribeth Lawrence MD;  Location: [facility];  Service: Bariatrics    REDUCTION MAMMAPLASTY Bilateral     Resolved: 2004    WISDOM TOOTH EXTRACTION         Family History   Problem Relation Age of Onset    Hypertension Mother     Heart attack Mother     Diabetes type II Mother         Mellitus    Hypertension Father     Diabetes type II Father         Mellitus    No Known Problems Sister     No Known Problems Maternal Grandmother     No Known Problems Maternal Grandfather     No Known Problems Paternal Grandmother     No Known Problems Paternal Grandfather     No Known Problems Sister          Medications have been verified  Objective   /66   Pulse 81   Temp 98 °F (36 7 °C)   Resp 17   Ht 5' 2" (1 575 m)   Wt 87 2 kg (192 lb 3 2 oz)   SpO2 100%   BMI 35 15 kg/m²        Physical Exam     Physical Exam   Constitutional: She is oriented to person, place, and time  She appears well-developed and well-nourished  No distress  HENT:   Head: Normocephalic and atraumatic  Cardiovascular: Normal rate, regular rhythm, S1 normal, S2 normal, normal heart sounds, intact distal pulses and normal pulses  Pulses:       Dorsalis pedis pulses are 2+ on the right side, and 2+ on the left side  Pulmonary/Chest: Effort normal and breath sounds normal    Musculoskeletal:        Left knee: She exhibits normal range of motion, no swelling, no effusion, no ecchymosis, no deformity, no erythema, no LCL laxity, normal patellar mobility, no bony tenderness and no MCL laxity  Tenderness found  Medial joint line and MCL tenderness noted  No lateral joint line, no LCL and no patellar tendon tenderness noted  Neurological: She is alert and oriented to person, place, and time  She has normal strength   GCS eye subscore is 4  GCS verbal subscore is 5  GCS motor subscore is 6  Skin: Skin is warm and dry  Capillary refill takes less than 2 seconds  She is not diaphoretic

## 2020-03-20 NOTE — PATIENT INSTRUCTIONS
Can take tylenol as needed for pain  Wear ace wrap as directed  Elevate, ice and rest  Can try epsom salt soaks  If you develop any increased pain, redness, swelling, numbness, tingling, decreased range of motion, or any new or concerning symptoms please return or proceed ER  Recommend following up with PCP in 3-5 days  Knee Pain   WHAT YOU NEED TO KNOW:   Knee pain may start suddenly, or it may be a long-term problem  You may have pain on the side, front, or back of your knee  You may have knee stiffness and swelling  You may hear popping sounds or feel like your knee is giving way or locking up as you walk  You may feel pain when you sit, stand, walk, or climb up and down stairs  Knee pain can be caused by conditions such as obesity, inflammation, or strains or tears in ligaments or tendons  DISCHARGE INSTRUCTIONS:   Follow up with your healthcare provider within 24 hours or as directed: You may need follow-up treatments, such as steroid injections to decrease pain  Write down your questions so you remember to ask them during your visits  Self-care:   · Rest  your knee so it can heal  Limit activities that increase your pain  · Ice  can help reduce swelling  Wrap ice in a towel and put it on your knee for as long and as often as directed  · Compression  with a brace or bandage can help reduce swelling  Use a brace or bandage only as directed  · Elevation  helps decrease pain and swelling  Elevate your knee while you are sitting or lying down  Prop your leg on pillows to keep your knee above the level of your heart  Medicines:   · NSAIDs  help decrease swelling and pain or fever  This medicine is available with or without a doctor's order  NSAIDs can cause stomach bleeding or kidney problems in certain people  If you take blood thinner medicine, always ask your healthcare provider if NSAIDs are safe for you  Always read the medicine label and follow directions      · Acetaminophen  decreases pain and fever  It is available without a doctor's order  Ask how much to take and when to take it  Follow directions  Acetaminophen can cause liver damage if not taken correctly  · Take your medicine as directed  Contact your healthcare provider if you think your medicine is not helping or if you have side effects  Tell him or her if you are allergic to any medicine  Keep a list of the medicines, vitamins, and herbs you take  Include the amounts, and when and why you take them  Bring the list or the pill bottles to follow-up visits  Carry your medicine list with you in case of an emergency  Exercise as directed: You may need to see a physical therapist or do recommended exercises to improve movement and decrease your pain  You may be directed to walk, swim, or ride a bike  Follow your exercise plan exactly as directed to avoid further injury  Contact your healthcare provider if:   · You have questions or concerns about your condition or care  Return to the emergency department if:   · Your pain is worse, even after treatment  · You cannot bend or straighten your leg completely  · The swelling around your knee does not go down even with treatment  · Your knee is painful and hot to the touch  © 2017 [address]  Information is for End User's use only and may not be sold, redistributed or otherwise used for commercial purposes  All illustrations and images included in CareNotes® are the copyrighted property of [employer] , [employer]  or Ron Hayes  The above information is an  only  It is not intended as medical advice for individual conditions or treatments  Talk to your doctor, nurse or pharmacist before following any medical regimen to see if it is safe and effective for you

## 2020-05-15 DIAGNOSIS — F32.A DEPRESSION, UNSPECIFIED DEPRESSION TYPE: ICD-10-CM

## 2020-05-18 RX ORDER — BUPROPION HYDROCHLORIDE 150 MG/1
TABLET ORAL
Qty: 180 TABLET | Refills: 1 | Status: SHIPPED | OUTPATIENT
Start: 2020-05-18 | End: 2020-12-22

## 2020-05-26 ENCOUNTER — OFFICE VISIT (OUTPATIENT)
Dept: INTERNAL MEDICINE CLINIC | Facility: CLINIC | Age: 45
End: 2020-05-26
Payer: COMMERCIAL

## 2020-05-26 VITALS
HEIGHT: 62 IN | HEART RATE: 71 BPM | DIASTOLIC BLOOD PRESSURE: 84 MMHG | WEIGHT: 197.4 LBS | TEMPERATURE: 98.5 F | BODY MASS INDEX: 36.33 KG/M2 | SYSTOLIC BLOOD PRESSURE: 128 MMHG | OXYGEN SATURATION: 99 %

## 2020-05-26 DIAGNOSIS — L72.3 INFECTED SEBACEOUS CYST: Primary | ICD-10-CM

## 2020-05-26 DIAGNOSIS — L08.9 INFECTED SEBACEOUS CYST: Primary | ICD-10-CM

## 2020-05-26 PROCEDURE — 3008F BODY MASS INDEX DOCD: CPT | Performed by: INTERNAL MEDICINE

## 2020-05-26 PROCEDURE — 10060 I&D ABSCESS SIMPLE/SINGLE: CPT | Performed by: INTERNAL MEDICINE

## 2020-05-26 PROCEDURE — 99213 OFFICE O/P EST LOW 20 MIN: CPT | Performed by: INTERNAL MEDICINE

## 2020-05-26 PROCEDURE — 1036F TOBACCO NON-USER: CPT | Performed by: INTERNAL MEDICINE

## 2020-05-26 RX ORDER — CEPHALEXIN 500 MG/1
500 CAPSULE ORAL EVERY 8 HOURS SCHEDULED
Qty: 21 CAPSULE | Refills: 0 | Status: SHIPPED | OUTPATIENT
Start: 2020-05-26 | End: 2020-06-02

## 2020-05-26 RX ORDER — DOXYCYCLINE HYCLATE 100 MG/1
100 CAPSULE ORAL EVERY 12 HOURS SCHEDULED
Qty: 14 CAPSULE | Refills: 0 | Status: SHIPPED | OUTPATIENT
Start: 2020-05-26 | End: 2020-06-02

## 2020-05-29 ENCOUNTER — OFFICE VISIT (OUTPATIENT)
Dept: INTERNAL MEDICINE CLINIC | Facility: CLINIC | Age: 45
End: 2020-05-29

## 2020-05-29 VITALS
DIASTOLIC BLOOD PRESSURE: 84 MMHG | TEMPERATURE: 97.9 F | HEART RATE: 75 BPM | BODY MASS INDEX: 36.1 KG/M2 | HEIGHT: 62 IN | WEIGHT: 196.2 LBS | OXYGEN SATURATION: 99 % | SYSTOLIC BLOOD PRESSURE: 120 MMHG

## 2020-05-29 DIAGNOSIS — L08.9 INFECTED SEBACEOUS CYST: Primary | ICD-10-CM

## 2020-05-29 DIAGNOSIS — L72.3 INFECTED SEBACEOUS CYST: Primary | ICD-10-CM

## 2020-05-29 PROCEDURE — 99024 POSTOP FOLLOW-UP VISIT: CPT | Performed by: NURSE PRACTITIONER

## 2020-05-29 PROCEDURE — 3008F BODY MASS INDEX DOCD: CPT | Performed by: NURSE PRACTITIONER

## 2020-05-29 PROCEDURE — 3008F BODY MASS INDEX DOCD: CPT | Performed by: INTERNAL MEDICINE

## 2020-05-29 PROCEDURE — 1036F TOBACCO NON-USER: CPT | Performed by: NURSE PRACTITIONER

## 2020-12-01 ENCOUNTER — TELEPHONE (OUTPATIENT)
Dept: INTERNAL MEDICINE CLINIC | Age: 45
End: 2020-12-01

## 2020-12-22 DIAGNOSIS — F32.A DEPRESSION, UNSPECIFIED DEPRESSION TYPE: ICD-10-CM

## 2020-12-22 RX ORDER — BUPROPION HYDROCHLORIDE 150 MG/1
TABLET ORAL
Qty: 180 TABLET | Refills: 1 | Status: SHIPPED | OUTPATIENT
Start: 2020-12-22 | End: 2021-06-21

## 2021-01-14 ENCOUNTER — OFFICE VISIT (OUTPATIENT)
Dept: INTERNAL MEDICINE CLINIC | Age: 46
End: 2021-01-14
Payer: COMMERCIAL

## 2021-01-14 VITALS
HEIGHT: 62 IN | WEIGHT: 214 LBS | BODY MASS INDEX: 39.38 KG/M2 | OXYGEN SATURATION: 99 % | HEART RATE: 73 BPM | SYSTOLIC BLOOD PRESSURE: 112 MMHG | TEMPERATURE: 97.9 F | DIASTOLIC BLOOD PRESSURE: 72 MMHG

## 2021-01-14 DIAGNOSIS — M54.2 NECK PAIN ON RIGHT SIDE: ICD-10-CM

## 2021-01-14 DIAGNOSIS — Z12.31 SCREENING MAMMOGRAM, ENCOUNTER FOR: Primary | ICD-10-CM

## 2021-01-14 DIAGNOSIS — M25.511 ACUTE PAIN OF RIGHT SHOULDER: Primary | ICD-10-CM

## 2021-01-14 PROCEDURE — 99214 OFFICE O/P EST MOD 30 MIN: CPT | Performed by: INTERNAL MEDICINE

## 2021-01-14 RX ORDER — MELOXICAM 15 MG/1
15 TABLET ORAL DAILY
Qty: 15 TABLET | Refills: 1 | Status: SHIPPED | OUTPATIENT
Start: 2021-01-14 | End: 2021-01-28 | Stop reason: ALTCHOICE
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
## 2021-01-21 ENCOUNTER — APPOINTMENT (OUTPATIENT)
Dept: RADIOLOGY | Age: 46
End: 2021-01-21
Payer: COMMERCIAL

## 2021-01-21 DIAGNOSIS — M25.511 ACUTE PAIN OF RIGHT SHOULDER: ICD-10-CM

## 2021-01-21 DIAGNOSIS — M54.2 NECK PAIN ON RIGHT SIDE: ICD-10-CM

## 2021-01-21 PROCEDURE — 72040 X-RAY EXAM NECK SPINE 2-3 VW: CPT

## 2021-01-21 PROCEDURE — 73030 X-RAY EXAM OF SHOULDER: CPT

## 2021-01-22 ENCOUNTER — EVALUATION (OUTPATIENT)
Dept: PHYSICAL THERAPY | Facility: CLINIC | Age: 46
End: 2021-01-22
Payer: COMMERCIAL

## 2021-01-22 DIAGNOSIS — M54.2 NECK PAIN ON RIGHT SIDE: ICD-10-CM

## 2021-01-22 DIAGNOSIS — M25.511 ACUTE PAIN OF RIGHT SHOULDER: ICD-10-CM

## 2021-01-22 PROCEDURE — 97161 PT EVAL LOW COMPLEX 20 MIN: CPT | Performed by: PHYSICAL THERAPIST

## 2021-01-22 PROCEDURE — 97110 THERAPEUTIC EXERCISES: CPT | Performed by: PHYSICAL THERAPIST
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
## 2021-01-26 ENCOUNTER — OFFICE VISIT (OUTPATIENT)
Dept: PHYSICAL THERAPY | Facility: CLINIC | Age: 46
End: 2021-01-26
Payer: COMMERCIAL

## 2021-01-26 DIAGNOSIS — M54.2 NECK PAIN ON RIGHT SIDE: ICD-10-CM

## 2021-01-26 DIAGNOSIS — M25.511 ACUTE PAIN OF RIGHT SHOULDER: Primary | ICD-10-CM

## 2021-01-26 PROCEDURE — 97140 MANUAL THERAPY 1/> REGIONS: CPT | Performed by: PHYSICAL THERAPIST

## 2021-01-26 PROCEDURE — 97110 THERAPEUTIC EXERCISES: CPT | Performed by: PHYSICAL THERAPIST

## 2021-01-26 NOTE — PROGRESS NOTES
Daily Note     Today's date: 2021  Patient name: Chano Vera  : 1975  MRN: [id number]  Referring provider: Hussain Aguilar MD  Dx:   Encounter Diagnosis     ICD-10-CM    1  Acute pain of right shoulder  M25 511    2  Neck pain on right side  M54 2                   Subjective: Pt reports that she is getting some increased medial arm pain intensity and greater duration recently  She notes hand parasthesia with active elongation UT stretch  All other exercises going well  Objective: See treatment diary below  Reproduction of distal sxs with axillary STM and GH mobs but sxs improved post mobilization  +okay sign continues on right    Assessment: Tolerated treatment fair  Patient would benefit from continued PT  Pt continues to present with median nerve dysfunction resulting in cervical pain and medial arm parasthesia that can radiate to the first 2 fingers  Decreased peripheral sxs with posture correction and chin tucks today  Educated on external rotation shoulder stretching as subscap was highly restricted and hourly posture correction to minimize daily stress on the shoulder/neck  Plan: Continue per plan of care        Precautions: none      Manuals             LV/UT/scalene STM CB            Axillary STM and GH gapping CB            External rotation shoulder stretch CB                         Neuro Re-Ed             Median nerve glides home            tband scap retraction home            Repeated retractions 10x5:                                                                Ther Ex             UT stretch 5x10:            External rotation table stretch 5:x10                                                                                          Ther Activity                                       Gait Training                                       Modalities             Cervical MHP 8'            Median nerve low Hwave nv

## 2021-01-28 ENCOUNTER — OFFICE VISIT (OUTPATIENT)
Dept: INTERNAL MEDICINE CLINIC | Age: 46
End: 2021-01-28
Payer: COMMERCIAL

## 2021-01-28 VITALS
WEIGHT: 213.2 LBS | DIASTOLIC BLOOD PRESSURE: 84 MMHG | HEIGHT: 62 IN | BODY MASS INDEX: 39.23 KG/M2 | OXYGEN SATURATION: 98 % | SYSTOLIC BLOOD PRESSURE: 128 MMHG | HEART RATE: 58 BPM | TEMPERATURE: 98 F

## 2021-01-28 DIAGNOSIS — M54.2 NECK PAIN ON RIGHT SIDE: ICD-10-CM

## 2021-01-28 DIAGNOSIS — M62.838 CERVICAL PARASPINAL MUSCLE SPASM: ICD-10-CM

## 2021-01-28 DIAGNOSIS — M25.511 ACUTE PAIN OF RIGHT SHOULDER: Primary | ICD-10-CM

## 2021-01-28 PROCEDURE — 99214 OFFICE O/P EST MOD 30 MIN: CPT | Performed by: INTERNAL MEDICINE

## 2021-01-28 RX ORDER — CYCLOBENZAPRINE HCL 10 MG
10 TABLET ORAL
Qty: 7 TABLET | Refills: 0 | Status: SHIPPED | OUTPATIENT
Start: 2021-01-28 | End: 2022-02-10 | Stop reason: ALTCHOICE

## 2021-01-28 RX ORDER — MELOXICAM 15 MG/1
15 TABLET ORAL DAILY
Qty: 15 TABLET | Refills: 1 | Status: SHIPPED | OUTPATIENT
Start: 2021-01-28 | End: 2022-02-10 | Stop reason: ALTCHOICE
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
## 2021-01-29 ENCOUNTER — OFFICE VISIT (OUTPATIENT)
Dept: PHYSICAL THERAPY | Facility: CLINIC | Age: 46
End: 2021-01-29
Payer: COMMERCIAL

## 2021-01-29 DIAGNOSIS — M25.511 ACUTE PAIN OF RIGHT SHOULDER: Primary | ICD-10-CM

## 2021-01-29 DIAGNOSIS — M54.2 NECK PAIN ON RIGHT SIDE: ICD-10-CM

## 2021-01-29 PROCEDURE — 97140 MANUAL THERAPY 1/> REGIONS: CPT | Performed by: PHYSICAL THERAPIST

## 2021-01-29 PROCEDURE — 97010 HOT OR COLD PACKS THERAPY: CPT | Performed by: PHYSICAL THERAPIST

## 2021-01-29 PROCEDURE — 97110 THERAPEUTIC EXERCISES: CPT | Performed by: PHYSICAL THERAPIST

## 2021-01-29 NOTE — PROGRESS NOTES
Daily Note     Today's date: 2021  Patient name: Justina Wright  : 1975  MRN: [id number]  Referring provider: Nathaniel Avila MD  Dx:   Encounter Diagnosis     ICD-10-CM    1  Acute pain of right shoulder  M25 511    2  Neck pain on right side  M54 2                   Subjective: Pt reports improvement in sxs since lv  She feels the postural exercises are helping and the arm pain is less  She was prescribed muscle relaxers yesterday at f/u with referring  Objective: See treatment diary below      Assessment: Tolerated treatment well  Patient would benefit from continued PT  Decreased arm tingling with [address] distraction during external rotation, adding to reasoning for nerve compression through axilarry region  STM to subscap  and reproducing sxs but able to take greater force today with less pain  Plan: Continue per plan of care        Precautions: none      Manuals            LV/UT/scalene STM CB CB           Axillary STM and GH gapping CB CB           External rotation shoulder stretch CB CB w/gh distraction                        Neuro Re-Ed             Median nerve glides home home           tband scap retraction home home           Repeated retractions 10x5: 5:x20                                                               Ther Ex             UT stretch 5x10: CB           External rotation table stretch 5:x10 CB                                                                                         Ther Activity                                       Gait Training                                       Modalities             Cervical MHP 8' 10'           Median nerve low Hwave nv np

## 2021-02-01 ENCOUNTER — APPOINTMENT (OUTPATIENT)
Dept: PHYSICAL THERAPY | Facility: CLINIC | Age: 46
End: 2021-02-01
Payer: COMMERCIAL

## 2021-02-04 ENCOUNTER — OFFICE VISIT (OUTPATIENT)
Dept: PHYSICAL THERAPY | Facility: CLINIC | Age: 46
End: 2021-02-04
Payer: COMMERCIAL

## 2021-02-04 DIAGNOSIS — M25.511 ACUTE PAIN OF RIGHT SHOULDER: Primary | ICD-10-CM

## 2021-02-04 DIAGNOSIS — M54.2 NECK PAIN ON RIGHT SIDE: ICD-10-CM

## 2021-02-04 PROCEDURE — 97010 HOT OR COLD PACKS THERAPY: CPT | Performed by: PHYSICAL THERAPIST

## 2021-02-04 PROCEDURE — 97110 THERAPEUTIC EXERCISES: CPT | Performed by: PHYSICAL THERAPIST

## 2021-02-04 PROCEDURE — 97140 MANUAL THERAPY 1/> REGIONS: CPT | Performed by: PHYSICAL THERAPIST

## 2021-02-04 NOTE — PROGRESS NOTES
Daily Note     Today's date: 2021  Patient name: Yash Byrd  : 1975  MRN: [id number]  Referring provider: Miguelito Clinton MD  Dx:   Encounter Diagnosis     ICD-10-CM    1  Acute pain of right shoulder  M25 511    2  Neck pain on right side  M54 2                   Subjective: Pt reports minimal arm numbness at this point  She does notice occasional sharp shooting pains in the shoulder but resolve quickly  Objective: See treatment diary below      Assessment: Tolerated treatment well  Patient would benefit from continued PT  Resolved ULTT1 sxs today but continued to note arm tingling with overhead PROM  Lat STM reproducing sxs and improvement in PROM post stretching/STM  Provided this for home as well  Plan: Continue per plan of care  Precautions: none      Manuals           LV/UT/scalene STM CB CB CB          Axillary STM and GH gapping CB CB CB          External rotation shoulder stretch CB CB w/gh distraction CB          Lat STM   CB          Neuro Re-Ed             Median nerve glides home home 3'          tband scap retraction home home home          Repeated retractions 10x5: 5:x20 home          Self lat STM   3'          erika pose R lat stretch   10x10:                                     Ther Ex             UT stretch 5x10: CB CB          External rotation table stretch 5:x10 CB home                                                                                        Ther Activity                                       Gait Training                                       Modalities             Cervical MHP 8' 10' 10'          Median nerve low Hwave nv np

## 2021-02-09 ENCOUNTER — OFFICE VISIT (OUTPATIENT)
Dept: PHYSICAL THERAPY | Facility: CLINIC | Age: 46
End: 2021-02-09
Payer: COMMERCIAL

## 2021-02-09 DIAGNOSIS — M25.511 ACUTE PAIN OF RIGHT SHOULDER: Primary | ICD-10-CM

## 2021-02-09 PROCEDURE — 97140 MANUAL THERAPY 1/> REGIONS: CPT | Performed by: PHYSICAL THERAPIST

## 2021-02-09 PROCEDURE — 97112 NEUROMUSCULAR REEDUCATION: CPT | Performed by: PHYSICAL THERAPIST

## 2021-02-09 PROCEDURE — 97110 THERAPEUTIC EXERCISES: CPT | Performed by: PHYSICAL THERAPIST

## 2021-02-09 NOTE — PROGRESS NOTES
Daily Note     Today's date: 2021  Patient name: Gerardo Hopkins  : 1975  MRN: [id number]  Referring provider: Sierra Feliciano MD  Dx:   Encounter Diagnosis     ICD-10-CM    1  Acute pain of right shoulder  M25 511                   Subjective: Pt reports that she has been having decreased arm pain recently and notes the pain as more localized to the neck/right scapula  Objective: See treatment diary below      Assessment: Tolerated treatment   Patient would benefit from continued PT  Pt with medial border scap muscle tightness and improvements in cervical flexion pain post mobilization of those groups  Mid thoracic tightness noted and added stretching for this  Progressing well  Plan: Continue per plan of care        Precautions: none      Manuals          LV/UT/scalene STM CB CB CB CB         Axillary STM and GH gapping CB CB CB CB         External rotation shoulder stretch CB CB w/gh distraction CB np         Lat STM   CB CB         Neuro Re-Ed             Median nerve glides home home 3' DC         tband scap retraction home home home home         Repeated retractions 10x5: 5:x20 home x20         Self lat STM   3' home         erika pose R lat stretch   10x10: CB                                   Ther Ex             UT stretch 5x10: CB CB CB         Thoracic ext o/pillow w/LTR    10x10: ea                                                                          Ther Activity                                       Gait Training                                       Modalities             Cervical MHP 8' 10' 10' np         Median nerve low Hwave nv np

## 2021-02-11 ENCOUNTER — OFFICE VISIT (OUTPATIENT)
Dept: PHYSICAL THERAPY | Facility: CLINIC | Age: 46
End: 2021-02-11
Payer: COMMERCIAL

## 2021-02-11 DIAGNOSIS — M25.511 ACUTE PAIN OF RIGHT SHOULDER: Primary | ICD-10-CM

## 2021-02-11 DIAGNOSIS — M54.2 NECK PAIN ON RIGHT SIDE: ICD-10-CM

## 2021-02-11 PROCEDURE — 97112 NEUROMUSCULAR REEDUCATION: CPT

## 2021-02-11 PROCEDURE — 97140 MANUAL THERAPY 1/> REGIONS: CPT

## 2021-02-11 PROCEDURE — 97110 THERAPEUTIC EXERCISES: CPT

## 2021-02-11 NOTE — PROGRESS NOTES
Daily Note     Today's date: 2021  Patient name: Brad Calloway  : 1975  MRN: [id number]  Referring provider: Jonas Mulligan MD  Dx:   Encounter Diagnosis     ICD-10-CM    1  Acute pain of right shoulder  M25 511    2  Neck pain on right side  M54 2                   Subjective: Pt reports increase soreness after last visit  Pt states pain level 2/10 right scapular region pre treatment  Objective: See treatment diary below      Assessment: Tolerated treatment well  Pt progressing steadily with improved cerv and right shoulder mobility  Mild right UE paresthesias reported with shoulder ER ROM but subsided quickly  Moderate TTP noted with STM right axilla and medial scapula  Patient would benefit from continued PT  Plan: Continue per plan of care        Precautions: none      Manuals         LV/UT/scalene STM CB CB CB CB GH        Axillary STM and GH gapping CB CB CB CB GH STM        External rotation shoulder stretch CB CB w/gh distraction CB np         Lat STM   CB CB GH        Neuro Re-Ed             Median nerve glides home home 3' DC         tband scap retraction home home home home         Repeated retractions 10x5: 5:x20 home x20         Self lat STM   3' home         erika pose R lat stretch   10x10: CB GH                                  Ther Ex             UT stretch 5x10: CB CB CB GH        Thoracic ext o/pillow w/LTR    10x10: ea GH                                                                         Ther Activity                                       Gait Training                                       Modalities             Cervical MHP 8' 10' 10' np 10'        Median nerve low Hwave nv np

## 2021-02-15 ENCOUNTER — OFFICE VISIT (OUTPATIENT)
Dept: PHYSICAL THERAPY | Facility: CLINIC | Age: 46
End: 2021-02-15
Payer: COMMERCIAL

## 2021-02-15 DIAGNOSIS — M54.2 NECK PAIN ON RIGHT SIDE: ICD-10-CM

## 2021-02-15 DIAGNOSIS — M25.511 ACUTE PAIN OF RIGHT SHOULDER: Primary | ICD-10-CM

## 2021-02-15 PROCEDURE — 97110 THERAPEUTIC EXERCISES: CPT

## 2021-02-15 PROCEDURE — 97112 NEUROMUSCULAR REEDUCATION: CPT

## 2021-02-15 PROCEDURE — 97140 MANUAL THERAPY 1/> REGIONS: CPT

## 2021-02-15 NOTE — PROGRESS NOTES
Daily Note     Today's date: 2/15/2021  Patient name: Tre Cagle  : 1975  MRN: [id number]  Referring provider: Velia Saenz MD  Dx:   Encounter Diagnosis     ICD-10-CM    1  Acute pain of right shoulder  M25 511    2  Neck pain on right side  M54 2                   Subjective: Pt cont's to c/o soreness right cerv/scapular region stating pain level 3/10 today  Right UE symptoms remain decreased  Objective: See treatment diary below      Assessment: Tolerated treatment well  Continue to note moderate TTP/soft tissue restriction right axilla, scalenes and medial scapular region  Pt responded well to manual therapy and exercise with decrease pain/tightness reported post treatment  Mild right UE paresthesias reported end ranges shoulder flexion and ER  Patient would benefit from continued PT  Plan: Continue per plan of care        Precautions: none      Manuals 1/26 1/29 2/4 2/9 2/11 2/15       LV/UT/scalene STM CB CB CB CB GH GH       Axillary STM and GH gapping CB CB CB CB GH STM GH       External rotation shoulder stretch CB CB w/gh distraction CB np         Lat STM   CB CB [address] GH       STM R medial scapula       GH       Neuro Re-Ed             Median nerve glides home home 3' DC         tband scap retraction home home home home  home        Repeated retractions 10x5: 5:x20 home x20  Prone scap sets 5"x10       Self lat STM   3' home  home       erika pose R lat stretch   10x10: CB GH GH                                 Ther Ex             UT stretch 5x10: CB CB CB [address] GH       Thoracic ext o/pillow w/LTR    10x10: ea GH 2 min f/b LTR 10"x  10                                                                        Ther Activity                                       Gait Training                                       Modalities             Cervical MHP 8' 10' 10' np 10' 10'       Median nerve low Hwave nv np

## 2021-02-18 ENCOUNTER — APPOINTMENT (OUTPATIENT)
Dept: PHYSICAL THERAPY | Facility: CLINIC | Age: 46
End: 2021-02-18
Payer: COMMERCIAL

## 2021-02-19 ENCOUNTER — APPOINTMENT (OUTPATIENT)
Dept: PHYSICAL THERAPY | Facility: CLINIC | Age: 46
End: 2021-02-19
Payer: COMMERCIAL

## 2021-02-22 ENCOUNTER — OFFICE VISIT (OUTPATIENT)
Dept: PHYSICAL THERAPY | Facility: CLINIC | Age: 46
End: 2021-02-22
Payer: COMMERCIAL

## 2021-02-22 DIAGNOSIS — M25.511 ACUTE PAIN OF RIGHT SHOULDER: Primary | ICD-10-CM

## 2021-02-22 DIAGNOSIS — M54.2 NECK PAIN ON RIGHT SIDE: ICD-10-CM

## 2021-02-22 PROCEDURE — 97110 THERAPEUTIC EXERCISES: CPT

## 2021-02-22 PROCEDURE — 97140 MANUAL THERAPY 1/> REGIONS: CPT

## 2021-02-22 PROCEDURE — 97112 NEUROMUSCULAR REEDUCATION: CPT

## 2021-02-22 NOTE — PROGRESS NOTES
Daily Note     Today's date: 2021  Patient name: Sofia Morales  : 1975  MRN: [id number]  Referring provider: Cynthia Truong MD  Dx:   Encounter Diagnosis     ICD-10-CM    1  Acute pain of right shoulder  M25 511    2  Neck pain on right side  M54 2                   Subjective: Pt reports improvement the past week with decrease pain right cerv/scapular region and decrease right UE paresthesias  Pt reports decrease tightness after doing home exercise but increases again with activity  Objective: See treatment diary below      Assessment: Tolerated treatment well  TTP/soft tissue restriction slowly decreasing with manual therapy right axilla, scalenes and medial scapular region  Continue to note intermittent right UE paresthesias during manual therapy and exercise but subsides quickly  Patient would benefit from continued PT  Plan: Continue per plan of care        Precautions: none      Manuals 1/26 1/29 2/4 2/9 2/11 2/15 2/22      LV/UT/scalene STM CB CB CB CB GH GH GH      Axillary STM and GH gapping CB CB CB CB GH STM [address] GH      External rotation shoulder stretch CB CB w/gh distraction CB np         Lat STM   CB CB [address] GH GH      STM R medial scapula       [address] GH      Neuro Re-Ed             Median nerve glides home home 3' DC         tband scap retraction home home home home  home        Repeated retractions 10x5: 5:x20 home x20  Prone scap sets 5"x10 [address]      Self lat STM   3' home  home       erika pose R lat stretch   10x10: CB [address] GH GH                                Ther Ex             UT stretch 5x10: CB CB CB [address] GH      Thoracic ext o/pillow w/LTR    10x10: ea GH 2 min f/b LTR 10"x  10 [address]                                                                       Ther Activity                                       Gait Training                                       Modalities             Cervical MHP 8' 10' 10' np 10' 10' 10'      Median nerve low Hwave nv np

## 2021-02-25 ENCOUNTER — OFFICE VISIT (OUTPATIENT)
Dept: PHYSICAL THERAPY | Facility: CLINIC | Age: 46
End: 2021-02-25
Payer: COMMERCIAL

## 2021-02-25 DIAGNOSIS — M25.511 ACUTE PAIN OF RIGHT SHOULDER: Primary | ICD-10-CM

## 2021-02-25 PROCEDURE — 97010 HOT OR COLD PACKS THERAPY: CPT | Performed by: PHYSICAL THERAPIST

## 2021-02-25 PROCEDURE — 97110 THERAPEUTIC EXERCISES: CPT | Performed by: PHYSICAL THERAPIST

## 2021-02-25 PROCEDURE — 97140 MANUAL THERAPY 1/> REGIONS: CPT | Performed by: PHYSICAL THERAPIST

## 2021-02-25 NOTE — PROGRESS NOTES
Daily Note     Today's date: 2021  Patient name: Mary Rodney  : 1975  MRN: [id number]  Referring provider: Fouzia Bello MD  Dx:   Encounter Diagnosis     ICD-10-CM    1  Acute pain of right shoulder  M25 511                   Subjective: Pt reports that she continues with some medial scapular pain      Objective: See treatment diary below      Assessment: Tolerated treatment well  Patient would benefit from continued PT  Pain with shoulder flexion unchanged with thoracic HVLAT, mobilizations and rhomboid STM  Shoulder flexion pain did resolve post first rib mobs and scalene stretching  Educated to further stretch the neck as it appears to be contributing to larger exten than some of the other regions  Plan: Continue per plan of care  Precautions: none      Manuals 1/26 1/29 2/4 2/9 2/11 2/15 2/22 2/25     LV/UT/scalene STM CB CB CB CB GH GH GH CB 1st rib mobs w/shoulder flex     Axillary STM and GH gapping CB CB CB CB GH STM [address] GH CB     External rotation shoulder stretch CB CB w/gh distraction CB np         Lat STM   CB CB [address] GH GH      STM R medial scapula       [address] GH CB     Neuro Re-Ed             Median nerve glides home home 3' DC         tband scap retraction home home home home  home        Repeated retractions 10x5: 5:x20 home x20  Prone scap sets 5"x10 [address] CB     Self lat STM   3' home  home       erika pose R lat stretch   10x10: CB GH GH GH CB                               Ther Ex             UT stretch 5x10: CB CB CB [address] GH GH CB     Thoracic ext o/pillow w/LTR    10x10: ea GH 2 min f/b LTR 10"x  10 GH CB     Seated scalene towel stretch        10x10:     Scalene towel stretch shoulder flexion        10x10;                                             Ther Activity                                       Gait Training                                       Modalities             Cervical MHP 8' 10' 10' np 10' 10' 10' 10'     Median nerve low Hwave nv np

## 2021-03-01 ENCOUNTER — OFFICE VISIT (OUTPATIENT)
Dept: PHYSICAL THERAPY | Facility: CLINIC | Age: 46
End: 2021-03-01
Payer: COMMERCIAL

## 2021-03-01 DIAGNOSIS — M25.511 ACUTE PAIN OF RIGHT SHOULDER: Primary | ICD-10-CM

## 2021-03-01 DIAGNOSIS — M54.2 NECK PAIN ON RIGHT SIDE: ICD-10-CM

## 2021-03-01 PROCEDURE — 97112 NEUROMUSCULAR REEDUCATION: CPT

## 2021-03-01 PROCEDURE — 97140 MANUAL THERAPY 1/> REGIONS: CPT

## 2021-03-01 PROCEDURE — 97110 THERAPEUTIC EXERCISES: CPT

## 2021-03-01 NOTE — PROGRESS NOTES
Daily Note     Today's date: 3/1/2021  Patient name: Jesica Norris  : 1975  MRN: [id number]  Referring provider: Christos Bonds MD  Dx:   Encounter Diagnosis     ICD-10-CM    1  Acute pain of right shoulder  M25 511    2  Neck pain on right side  M54 2                   Subjective: Pt reports feeling better since last visit with good response to manual therapy and scalene stretches  Objective: See treatment diary below      Assessment: Tolerated treatment well  Moderate TTP/tightness noted right scalenes/1st rib region  Pt responding well to 1st rib and scalene stretches with improved pain free shoulder flexion AROM noted post treatment  Patient would benefit from continued PT  Plan: Continue per plan of care        Precautions: none      Manuals 1/26 1/29 2/4 2/9 2/11 2/15 2/22 2/25 3/1    LV/UT/scalene STM CB CB CB CB GH GH GH CB 1st rib mobs w/shoulder flex GH    Axillary STM and GH gapping CB CB CB CB GH STM GH GH CB GH    External rotation shoulder stretch CB CB w/gh distraction CB np         Lat STM   CB CB [address] GH GH      STM R medial scapula       [address] GH CB     Neuro Re-Ed             Median nerve glides home home 3' DC         tband scap retraction home home home home  home        Repeated retractions 10x5: 5:x20 home x20  Prone scap sets 5"x10 [address] CB     Self lat STM   3' home  home       erika pose R lat stretch   10x10: CB [address] GH [address] CB home                              Ther Ex             UT stretch 5x10: CB CB CB [address] GH GH CB [address]    Thoracic ext o/pillow w/LTR    10x10: ea GH 2 min f/b LTR 10"x  10 GH CB 3 min f/b LTR 10"x10    Seated scalene towel stretch        10x10: 10"x10    Scalene towel stretch shoulder flexion        10x10; 10"x10                                           Ther Activity                                       Gait Training                                       Modalities             Cervical MHP 8' 10' 10' np 10' 10' 10' 10' 10'    Median nerve low Hwave nv np

## 2021-03-04 ENCOUNTER — OFFICE VISIT (OUTPATIENT)
Dept: PHYSICAL THERAPY | Facility: CLINIC | Age: 46
End: 2021-03-04
Payer: COMMERCIAL

## 2021-03-04 DIAGNOSIS — M25.511 ACUTE PAIN OF RIGHT SHOULDER: Primary | ICD-10-CM

## 2021-03-04 DIAGNOSIS — M54.2 NECK PAIN ON RIGHT SIDE: ICD-10-CM

## 2021-03-04 PROCEDURE — 97112 NEUROMUSCULAR REEDUCATION: CPT

## 2021-03-04 PROCEDURE — 97140 MANUAL THERAPY 1/> REGIONS: CPT

## 2021-03-04 PROCEDURE — 97110 THERAPEUTIC EXERCISES: CPT

## 2021-03-04 NOTE — PROGRESS NOTES
Daily Note     Today's date: 3/4/2021  Patient name: Crystal Samuels  : 1975  MRN: [id number]  Referring provider: Cisco Tineo MD  Dx:   Encounter Diagnosis     ICD-10-CM    1  Acute pain of right shoulder  M25 511    2  Neck pain on right side  M54 2                   Subjective: Pt cont's to report improvement overall with decrease right cerv/scapular/shoulder pain  Pt states pain level 3/10 at worst; 0/10 at best    Minimal c/o stiffness today  Objective: See treatment diary below      Assessment: Tolerated treatment well  Moderate TTP/soft tissue restriction right scalenes/1st rib  Decrease TTP/tightness noted right medial scapular region  Pt cont's to respond well to manual therapy and exercise with decrease pain and improved mobility post treatment  Pt demonstrates proper technique with exercise requiring minimal verbal/tactile cues  Patient would benefit from continued PT  Plan: Continue per plan of care        Precautions: none      Manuals  2/4 2/9 2/11 2/15 2/22 2/25 3/1 3/4   LV/UT/scalene STM CB CB CB CB GH GH GH CB 1st rib mobs w/shoulder flex [facility] GH   Axillary STM and GH gapping CB CB CB CB GH STM [facility] GH CB [facility] GH   External rotation shoulder stretch CB CB w/gh distraction CB np         Lat STM   CB CB [facility]      STM R medial scapula       [facility] GH CB  GH   Neuro Re-Ed             Median nerve glides home home 3' DC         tband scap retraction home home home home  home        Repeated retractions 10x5: 5:x20 home x20  Prone scap sets 5"x10 [facility] CB     Self lat STM   3' home  home       erika pose R lat stretch   10x10: CB [facility] CB home                              Ther Ex             UT stretch 5x10: CB CB CB [facility] CB [facility]   Thoracic ext o/pillow w/LTR    10x10: ea GH 2 min f/b LTR 10"x  10 GH CB 3 min f/b LTR 10"x10 GH   Seated scalene towel stretch        10x10: 10"x10    Scalene towel stretch shoulder flexion        10x10; 10"x10 [facility] Ther Activity                                       Gait Training                                       Modalities             Cervical MHP 8' 10' 10' np 10' 10' 10' 10' 10' 10'   Median nerve low Hwave nv np

## 2021-03-08 ENCOUNTER — OFFICE VISIT (OUTPATIENT)
Dept: PHYSICAL THERAPY | Facility: CLINIC | Age: 46
End: 2021-03-08
Payer: COMMERCIAL

## 2021-03-08 DIAGNOSIS — M25.511 ACUTE PAIN OF RIGHT SHOULDER: Primary | ICD-10-CM

## 2021-03-08 DIAGNOSIS — M54.2 NECK PAIN ON RIGHT SIDE: ICD-10-CM

## 2021-03-08 PROCEDURE — 97112 NEUROMUSCULAR REEDUCATION: CPT

## 2021-03-08 PROCEDURE — 97140 MANUAL THERAPY 1/> REGIONS: CPT

## 2021-03-08 PROCEDURE — 97110 THERAPEUTIC EXERCISES: CPT

## 2021-03-08 NOTE — PROGRESS NOTES
Daily Note     Today's date: 3/8/2021  Patient name: Narciso Rangel  : 1975  MRN: [id number]  Referring provider: Octaviano More MD  Dx:   Encounter Diagnosis     ICD-10-CM    1  Acute pain of right shoulder  M25 511    2  Neck pain on right side  M54 2                   Subjective: Pt c/o increase stiffness/soreness right cerv,scapular and shoulder region the past few days without any known reason  Pt states pain level 4/10 pre treatment  Pt states approx 75% improvement since starting therapy  Objective: See treatment diary below  Pt seen x12 visits with FOTO outcome measure 65 at IE and 80 this visit  Assessment: Tolerated treatment well  Pt responding well to treatment interventions with decrease pain and improved cerv/right shoulder mobility  Pt demonstrates proper technique with exercise requiring minimal verbal/tactile cues  Plan: Continue per plan of care  Discussed with therapist (CB) and will continue PT x1 visit this week and transition to HEP         Precautions: none      Manuals 3/8 1/29 2/4 2/9 2/11 2/15 2/22 2/25 3/1 3   LV/UT/scalene STM GH CB CB CB [address] GH GH CB 1st rib mobs w/shoulder flex [address] GH   Axillary STM and GH gapping  CB CB CB [address] STM [address] GH CB [address] GH   External rotation shoulder stretch  CB w/gh distraction CB np         Lat STM   CB CB [address] GH [address]      STM R medial scapula       [address] GH CB  GH   Neuro Re-Ed             Median nerve glides home home 3' DC         tband scap retraction home home home home  home        Prone scap sets  5:x20 home x20  Prone scap sets 5"x10 [address] CB     Self lat STM home  3' home  home       erika pose R lat stretch home  10x10: CB [address] GH [address] CB home                              Ther Ex             Supine active elongation UT stretch R 10"x10 CB CB CB [address] GH [address] CB [address] GH   Thoracic ext o/pillow w/LTR 3 min f/b LTR 10"x10   10x10: ea GH 2 min f/b LTR 10"x  10 GH CB 3 min f/b LTR 10"x10 GH   Seated scalene towel stretch 10"x10 10x10: 10"x10 GH   Scalene towel stretch shoulder flexion 10"x10       10x10; 10"x10 [facility]                                          Ther Activity                                       Gait Training                                       Modalities             Cervical MHP 10' 10' 10' np 10' 10' 10' 10' 10' 10'   Median nerve low Hwave np np

## 2021-03-11 ENCOUNTER — OFFICE VISIT (OUTPATIENT)
Dept: INTERNAL MEDICINE CLINIC | Age: 46
End: 2021-03-11
Payer: COMMERCIAL

## 2021-03-11 ENCOUNTER — OFFICE VISIT (OUTPATIENT)
Dept: PHYSICAL THERAPY | Facility: CLINIC | Age: 46
End: 2021-03-11
Payer: COMMERCIAL

## 2021-03-11 VITALS
BODY MASS INDEX: 39.56 KG/M2 | TEMPERATURE: 98 F | OXYGEN SATURATION: 99 % | WEIGHT: 215 LBS | HEIGHT: 62 IN | SYSTOLIC BLOOD PRESSURE: 96 MMHG | DIASTOLIC BLOOD PRESSURE: 60 MMHG | HEART RATE: 79 BPM

## 2021-03-11 DIAGNOSIS — Z00.00 ANNUAL PHYSICAL EXAM: Primary | ICD-10-CM

## 2021-03-11 DIAGNOSIS — Z28.21 INFLUENZA VACCINATION DECLINED BY PATIENT: ICD-10-CM

## 2021-03-11 DIAGNOSIS — M25.511 ACUTE PAIN OF RIGHT SHOULDER: Primary | ICD-10-CM

## 2021-03-11 DIAGNOSIS — Z23 NEED FOR INFLUENZA VACCINATION: ICD-10-CM

## 2021-03-11 DIAGNOSIS — M54.2 NECK PAIN ON RIGHT SIDE: ICD-10-CM

## 2021-03-11 PROCEDURE — 3008F BODY MASS INDEX DOCD: CPT | Performed by: INTERNAL MEDICINE

## 2021-03-11 PROCEDURE — 97140 MANUAL THERAPY 1/> REGIONS: CPT

## 2021-03-11 PROCEDURE — 97112 NEUROMUSCULAR REEDUCATION: CPT

## 2021-03-11 PROCEDURE — 99396 PREV VISIT EST AGE 40-64: CPT | Performed by: INTERNAL MEDICINE

## 2021-03-11 PROCEDURE — 1036F TOBACCO NON-USER: CPT | Performed by: INTERNAL MEDICINE

## 2021-03-11 PROCEDURE — 97110 THERAPEUTIC EXERCISES: CPT

## 2021-03-11 NOTE — PROGRESS NOTES
Daily Note / Discharge Summary     Today's date: 3/11/2021  Patient name: Russell Calloway  : 1975  MRN: [id number]  Referring provider: Kiarra Rogers MD  Dx:   Encounter Diagnosis     ICD-10-CM    1  Acute pain of right shoulder  M25 511    2  Neck pain on right side  M54 2                   Subjective: Pt cont's to report improvement overall stating 75% improved since starting therapy  Pt states pain level 4/10 at worst; 0/10 at best; 0/10 at present  Objective: See treatment diary below  Assessment: Tolerated treatment well  Decreased TTP/tightness noted right scalenes  Pt has good understanding of exercise program and demonstrates proper technique  Pt independent with HEP          Plan: Discussed with therapist (CB) and will D/C PT to HEP       Precautions: none      Manuals 3/8 3/11 2/4 2/9 2/11 2/15 2/22 2/25 3/1 3/4   LV/UT/scalene STM GH GH CB CB [address] GH GH CB 1st rib mobs w/shoulder flex [address] GH   Axillary STM and [address] gapping   CB CB [address] STM [address] CB [address]   External rotation shoulder stretch   CB np         Lat STM   CB CB [address]      STM R medial scapula       [address] GH CB  [address]   Neuro Re-Ed             Median nerve glides home home 3' DC         tband scap retraction home home home home  home        Prone scap sets   home x20  Prone scap sets 5"x10 [address] CB     Self lat STM home  3' home  home       erika pose R lat stretch home  10x10: CB [address] CB home                              Ther Ex             Supine active elongation UT stretch R 10"x10 [address] CB CB [address] GH [address] CB [address] GH   Thoracic ext o/pillow w/LTR 3 min f/b LTR 10"x10 GH  10x10: ea GH 2 min f/b LTR 10"x  10 GH CB 3 min f/b LTR 10"x10 [address]   Seated scalene towel stretch 10"x10 GH      10x10: 10"x10 GH   Scalene towel stretch shoulder flexion 10"x10 GH      10x10; 10"x10 [facility]                                          Ther Activity                                       Gait Training                                       Modalities Cervical MHP 10' 10' 10' np 10' 10' 10' 10' 10' 10'   Median nerve low Hwave np np

## 2021-03-11 NOTE — PATIENT INSTRUCTIONS

## 2021-03-11 NOTE — PROGRESS NOTES
Assessment/Plan:    Annual physical exam  - History and physical examination done  - Pt was counseled to eat a heart healthy diet, to drink at least 2 L of water daily, to take a daily multivitamin and to exercise for at least 30 minutes of cardio exercise daily, for at least 5 days a week  - CBC, CMP, TSH and lipid panel have been ordered and we will follow up with the results  - patient does not want to get the flu vaccine and she is due for the tetanus vaccine which she will return on another day to get  - patient needs but a mammogram and a Pap smear  She has scheduled for her mammogram and will follow-up with her OBGYN for a Pap smear   - follow up in 6 months with PCP or sooner as needed  Need for influenza vaccination  -Patient declines the flu shot because she states that the only time she got the flu shot, she developed the flu  -she was counseled to reconsider when she gets older as the influenza infection can be severe in older patients     Diagnoses and all orders for this visit:    Annual physical exam  -     CBC and differential; Future  -     TSH, 3rd generation with Free T4 reflex; Future  -     Comprehensive metabolic panel; Future  -     Lipid panel; Future    Need for influenza vaccination    Influenza vaccination declined by patient          Subjective:      Patient ID: Crystal Samuels is a 39 y o  female      HPI  Patient presents for an annual physical exam     Last annual physical exam- cant remember    Past medical history- gerd, anxiety, depression, hld, obesity, vit d def, bariatic sx, malabsorption, hx of anemia    Past surgical history- gastric sleeve, L acl repair, left knee arthroscopic sx for torn cartilage, R ankle sx s/p fracture    Medications- see list     Allergies-NKDA    Diet-a mixture of balanced diet and junk, water - about 50-60 oz a day    Exercise- some    Alcohol use-occasionally, once or twice a month, max of 2 drinks    Caffeine and soda use- no soda, 16 oz of coffee daily about 4-5 times a week    Nicotine use- never    Recreational drug use-none    Work-     Sexual history, STD history and HIV testing-monogamous with male partner, std hx - never, HIV testing - never    Gynecological history/Prostate health/testicular health history- LMP - 3/11/21, last pap smear - ?  2016, due, last mammogram - 2020, already scheduled for this year    Colonoscopy- N/A    Immunization history- last flu shot -  Years ago, last tetanus - cant remember    Dental visit- every 6 months    Vision- glasses, hyperopia    Family history- htn - dad, dm - parents, MI - mom at mid 62s,     Today, patient admits to occasional headaches when she does not wear her glasses and 1 occasion of lightheadedness when she stood up but denies fever, chills, night sweats, nasal congestion, rhinorrhea, sore throat, chest pain, shortness of breath, palpitations, nausea, vomiting, abdominal pain, diarrhea, constipation, myalgias, arthralgias  She states that her depression and anxiety are very well controlled on her current medications  The following portions of the patient's history were reviewed and updated as appropriate:   She  has a past medical history of Anemia, Bariatric surgery status, CPAP (continuous positive airway pressure) dependence, Depression, History of anxiety, Left knee pain, Low back pain, Morbid obesity ([address] ), Motion sickness, Obesity, PONV (postoperative nausea and vomiting), Postgastrectomy malabsorption, Prediabetes, Right ankle pain, Shortness of breath, Sleep apnea, Walks frequently, and Wears glasses    She   Patient Active Problem List    Diagnosis Date Noted    Annual physical exam 03/11/2021    Need for influenza vaccination 03/11/2021    Influenza vaccination declined by patient 03/11/2021    Right shoulder pain 01/14/2021    Neck pain on right side 01/14/2021    Infected sebaceous cyst 05/26/2020    Vitamin D deficiency 05/29/2019    Gastroesophageal reflux disease without esophagitis 09/12/2018    Obesity, Class I, BMI 30-34 9 08/30/2018    Postsurgical malabsorption 08/29/2018    Iron deficiency 08/29/2018    Bariatric surgery status 06/20/2018    Anxiety disorder 10/22/2014    Depression 10/22/2014    Hypercholesterolemia 10/30/2013     She  has a past surgical history that includes Anterior cruciate ligament repair (Left); Ankle surgery (Bilateral); Knee cartilage surgery; pr egd transoral biopsy single/multiple (N/A, 3/21/2018); [place] tooth extraction; pr lap, diaz restrict proc, longitudinal gastrectomy (N/A, 5/8/2018); Bariatric Surgery (05/08/2018); Knee surgery (Left); and Reduction mammaplasty (Bilateral)  Her family history includes Diabetes type II in her father and mother; Heart attack in her mother; Hypertension in her father and mother; No Known Problems in her maternal grandfather, maternal grandmother, paternal grandfather, paternal grandmother, sister, and sister  She  reports that she has never smoked  She has never used smokeless tobacco  She reports previous alcohol use  She reports that she does not use drugs  Current Outpatient Medications   Medication Sig Dispense Refill    buPROPion (WELLBUTRIN XL) 150 mg 24 hr tablet TAKE 1 TABLET BY MOUTH TWICE A  tablet 1    CALCIUM PO Take 1 each by mouth 3 (three) times a day        ferrous sulfate 325 (65 Fe) mg tablet Take 325 mg by mouth daily with breakfast      meloxicam (MOBIC) 15 mg tablet Take 1 tablet (15 mg total) by mouth daily 15 tablet 1    Multiple Vitamin (MULTIVITAMIN) tablet Take 1 tablet by mouth daily      cyclobenzaprine (FLEXERIL) 10 mg tablet Take 1 tablet (10 mg total) by mouth daily at bedtime (Patient not taking: Reported on 3/11/2021) 7 tablet 0     No current facility-administered medications for this visit        Current Outpatient Medications on File Prior to Visit   Medication Sig    buPROPion (WELLBUTRIN XL) 150 mg 24 hr tablet TAKE 1 TABLET BY MOUTH TWICE A DAY    CALCIUM PO Take 1 each by mouth 3 (three) times a day      ferrous sulfate 325 (65 Fe) mg tablet Take 325 mg by mouth daily with breakfast    meloxicam (MOBIC) 15 mg tablet Take 1 tablet (15 mg total) by mouth daily    Multiple Vitamin (MULTIVITAMIN) tablet Take 1 tablet by mouth daily    cyclobenzaprine (FLEXERIL) 10 mg tablet Take 1 tablet (10 mg total) by mouth daily at bedtime (Patient not taking: Reported on 3/11/2021)     No current facility-administered medications on file prior to visit  She has No Known Allergies       Review of Systems   Constitutional: Negative for activity change, chills, fatigue, fever and unexpected weight change  HENT: Negative for ear pain, postnasal drip, rhinorrhea, sinus pressure and sore throat  Eyes: Negative for pain  Respiratory: Negative for cough, choking, chest tightness, shortness of breath and wheezing  Cardiovascular: Negative for chest pain, palpitations and leg swelling  Gastrointestinal: Negative for abdominal pain, constipation, diarrhea, nausea and vomiting  Genitourinary: Negative for dysuria and hematuria  Musculoskeletal: Negative for arthralgias, back pain, gait problem, joint swelling, myalgias and neck stiffness  Skin: Negative for pallor and rash  Neurological: Positive for headaches (related to not wearing glasses)  Negative for dizziness, tremors, seizures, syncope and light-headedness  Hematological: Negative for adenopathy  Psychiatric/Behavioral: Positive for dysphoric mood (controlled)  Negative for behavioral problems  The patient is nervous/anxious (controlled)  Objective:      BP 96/60 (BP Location: Left arm, Patient Position: Sitting, Cuff Size: Large)   Pulse 79   Temp 98 °F (36 7 °C) (Temporal)   Ht 5' 2" (1 575 m)   Wt 97 5 kg (215 lb)   SpO2 99%   BMI 39 32 kg/m²          Physical Exam  Constitutional:       General: She is not in acute distress       Appearance: She is well-developed  She is obese  She is not diaphoretic  Comments: BMI is 39 32   HENT:      Head: Normocephalic and atraumatic  Right Ear: External ear normal       Left Ear: External ear normal       Nose: Septal deviation (Septal deviation to the left) present  Mouth/Throat:      Mouth: Mucous membranes are dry  Pharynx: No oropharyngeal exudate or posterior oropharyngeal erythema  Comments: Mallampati class 3 oropharynx with dry mucous membranes  Eyes:      General: No scleral icterus  Right eye: No discharge  Left eye: No discharge  Conjunctiva/sclera: Conjunctivae normal       Pupils: Pupils are equal, round, and reactive to light  Neck:      Musculoskeletal: Normal range of motion and neck supple  Thyroid: No thyromegaly  Vascular: No JVD  Trachea: No tracheal deviation  Cardiovascular:      Rate and Rhythm: Normal rate and regular rhythm  Heart sounds: Normal heart sounds  No murmur  No friction rub  No gallop  Pulmonary:      Effort: Pulmonary effort is normal  No respiratory distress  Breath sounds: Normal breath sounds  No wheezing or rales  Chest:      Chest wall: No tenderness  Abdominal:      General: Bowel sounds are normal  There is no distension  Palpations: Abdomen is soft  There is no mass  Tenderness: There is no abdominal tenderness  There is no guarding or rebound  Musculoskeletal: Normal range of motion  General: No tenderness or deformity  Lymphadenopathy:      Cervical: No cervical adenopathy  Skin:     General: Skin is warm and dry  Coloration: Skin is not pale  Findings: No erythema or rash  Neurological:      Mental Status: She is alert and oriented to person, place, and time  Cranial Nerves: No cranial nerve deficit  Motor: No abnormal muscle tone  Coordination: Coordination normal       Deep Tendon Reflexes: Reflexes are normal and symmetric        Comments: Cranial nerves 2-12 are intact bilaterally  Muscle strength is 5/5 in all extremities  Sensation is intact in bilateral face and extremities  Rapid alternating movement and finger-to-nose pointing test intact   Deep tendon reflexes are 2+ bilaterally  Deep gait is intact     Psychiatric:         Behavior: Behavior normal            No visits with results within 12 Month(s) from this visit  Latest known visit with results is:   Appointment on 10/25/2019   Component Date Value Ref Range Status    Glucose, Fasting 10/25/2019 83  65 - 99 mg/dL Final      Specimen collection should occur prior to Sulfasalazine administration due to the potential for falsely depressed results  Specimen collection should occur prior to Sulfapyridine administration due to the potential for falsely elevated results   Cholesterol 10/25/2019 240* 50 - 200 mg/dL Final      Cholesterol:       Desirable         <200 mg/dl       Borderline         200-239 mg/dl       High              >239           Triglycerides 10/25/2019 74  <=150 mg/dL Final      Triglyceride:     Normal          <150 mg/dl     Borderline High 150-199 mg/dl     High            200-499 mg/dl        Very High       >499 mg/dl    Specimen collection should occur prior to N-Acetylcysteine or Metamizole administration due to the potential for falsely depressed results   HDL, Direct 10/25/2019 67  >=40 mg/dL Final      HDL Cholesterol:       Low     <41 mg/dL  Specimen collection should occur prior to Metamizole administration due to the potential for falsley depressed results   LDL Calculated 10/25/2019 158* 0 - 100 mg/dL Final      LDL Cholesterol:     Optimal           <100 mg/dl     Near Optimal      100-129 mg/dl     Above Optimal       Borderline High 130-159 mg/dl       High            160-189 mg/dl       Very High       >189 mg/dl         This screening LDL is a calculated result     It does not have the accuracy of the Direct Measured LDL in the monitoring of patients with hyperlipidemia and/or statin therapy  Direct Measure LDL (VUG309) must be ordered separately in these patients   Non-HDL-Chol (CHOL-HDL) 10/25/2019 173  mg/dl Final    Nicotine 10/25/2019 None Detected  ng/mL Final    This test was developed and its performance characteristics  determined by [facility]  It has not been cleared or approved  by the Food and Drug Administration  Nicotine levels greater than 2 0 are consistent with the use of  tobacco or tobacco cessation products   Cotinine 10/25/2019 None Detected  ng/mL Final    This test was developed and its performance characteristics  determined by [facility]  It has not been cleared or approved  by the Food and Drug Administration  Cotinine levels greater than 20 0 are consistent with the use of  tobacco or tobacco cessation products

## 2021-06-20 DIAGNOSIS — F32.A DEPRESSION, UNSPECIFIED DEPRESSION TYPE: ICD-10-CM

## 2021-06-21 RX ORDER — BUPROPION HYDROCHLORIDE 150 MG/1
TABLET ORAL
Qty: 180 TABLET | Refills: 1 | Status: SHIPPED | OUTPATIENT
Start: 2021-06-21

## 2021-08-02 ENCOUNTER — VBI (OUTPATIENT)
Dept: ADMINISTRATIVE | Facility: OTHER | Age: 46
End: 2021-08-02

## 2021-09-07 ENCOUNTER — TELEMEDICINE (OUTPATIENT)
Dept: INTERNAL MEDICINE CLINIC | Facility: CLINIC | Age: 46
End: 2021-09-07
Payer: COMMERCIAL

## 2021-09-07 VITALS — WEIGHT: 215 LBS | HEIGHT: 62 IN | BODY MASS INDEX: 39.56 KG/M2

## 2021-09-07 DIAGNOSIS — U07.1 COVID-19: Primary | ICD-10-CM

## 2021-09-07 PROCEDURE — 99212 OFFICE O/P EST SF 10 MIN: CPT | Performed by: NURSE PRACTITIONER

## 2021-09-07 PROCEDURE — 1036F TOBACCO NON-USER: CPT | Performed by: NURSE PRACTITIONER

## 2021-09-07 PROCEDURE — 3008F BODY MASS INDEX DOCD: CPT | Performed by: NURSE PRACTITIONER

## 2021-09-07 NOTE — PROGRESS NOTES
COVID-19 Outpatient Progress Note    Assessment/Plan:    Problem List Items Addressed This Visit     None      Visit Diagnoses     COVID-19    -  Primary         Disposition:     I recommended continued isolation until at least 24 hours have passed since recovery defined as resolution of fever without the use of fever-reducing medications AND improvement in COVID symptoms AND 10 days have passed since onset of symptoms (or 10 days have passed since date of first positive viral diagnostic test for asymptomatic patients)  She is now cleared off isolation d/t 10 days passing and being 24 hours fever free without medications  Note given to return to work without restrictions  Reviewed red flag signs to call the office with or go to the Emergency Department with  Patient verbalizes understanding  I have spent 10 minutes directly with the patient  Greater than 50% of this time was spent in counseling/coordination of care regarding: prognosis, risks and benefits of treatment options, instructions for management, patient and family education, importance of treatment compliance, risk factor reductions and impressions  Verification of patient location:    Patient is located in the following state in which I hold an active license [place]    Encounter provider STEVE Castorena    Provider located at [address] [place] [phone]    Recent Visits  No visits were found meeting these conditions  Showing recent visits within past 7 days and meeting all other requirements  Today's Visits  Date Type Provider Dept   09/07/21 Telemedicine STEVE Ledezma [facility]   Showing today's visits and meeting all other requirements  Future Appointments  No visits were found meeting these conditions    Showing future appointments within next 150 days and meeting all other requirements     This virtual check-in was done via [employer] and patient was informed that this is a secure, HIPAA-compliant platform  She agrees to proceed  Patient agrees to participate in a virtual check in via telephone or video visit instead of presenting to the office to address urgent/immediate medical needs  Patient is aware this is a billable service  After connecting through [facility], the patient was identified by name and date of birth  Roberto Robles was informed that this was a telemedicine visit and that the exam was being conducted confidentially over secure lines  My office door was closed  No one else was in the room  Roberto Robles acknowledged consent and understanding of privacy and security of the telemedicine visit  I informed the patient that I have reviewed her record in Epic and presented the opportunity for her to ask any questions regarding the visit today  The patient agreed to participate  Subjective: Roberto Robles is a 39 y o  female who has been screened for COVID-19  Symptom change since last report: resolving  Patient's symptoms include fatigue (mild) and nasal congestion (mild)  Patient denies fever, chills, rhinorrhea, sore throat, anosmia, loss of taste, cough, shortness of breath, chest tightness, abdominal pain, nausea, vomiting, diarrhea, myalgias and headaches  Date of symptom onset: 8/28/2021  Date of positive COVID-19 PCR: 8/29/2021  COVID-19 vaccination status: Not vaccinated    Anival Philippe has been staying home and has isolated themselves in her home  She is taking care to not share personal items and is cleaning all surfaces that are touched often, like counters, tabletops, and doorknobs using household cleaning sprays or wipes  She is wearing a mask when she leaves her room       She tested positive through [facility]     No results found for: [address] [facility] [address], [address] [facility], [address] [facility], [place] [address]  Past Medical History:   Diagnosis Date    Anemia     Bariatric surgery status     CPAP (continuous positive airway pressure) dependence     Depression     History of anxiety     Left knee pain     "occas"    Low back pain     "mayra if standing for a while"    Morbid obesity (HCC)     Motion sickness     Obesity     PONV (postoperative nausea and vomiting)     Postgastrectomy malabsorption     Prediabetes     Right ankle pain     " occas"    Shortness of breath     "exertional"    Sleep apnea     Mild    Walks frequently     for exercise    Wears glasses      Past Surgical History:   Procedure Laterality Date    ANKLE SURGERY Bilateral     Right with screws and plate implant  Left ACL repair Resolved: [address] Left     screws implanted    BARIATRIC SURGERY  05/08/2018    KNEE CARTILAGE SURGERY      right    KNEE SURGERY Left     [place] EGD TRANSORAL BIOPSY SINGLE/MULTIPLE N/A 3/21/2018    Procedure: ESOPHAGOGASTRODUODENOSCOPY (EGD) with bx;  Surgeon: Jessica aCballero MD;  [facility];   Service: Bariatrics    AK LAP, ELIN RESTRICT PROC, LONGITUDINAL GASTRECTOMY N/A 5/8/2018    Procedure: LAPAROSCOPIC SLEEVE GASTRECTOMY WITH ROBOTICS;  Surgeon: Jessica Caballero MD;  [facility];  Service: Bariatrics    REDUCTION MAMMAPLASTY Bilateral     Resolved: 2004    WISDOM TOOTH EXTRACTION       Current Outpatient Medications   Medication Sig Dispense Refill    buPROPion (WELLBUTRIN XL) 150 mg 24 hr tablet TAKE 1 TABLET BY MOUTH TWICE A  tablet 1    CALCIUM PO Take 1 each by mouth 3 (three) times a day        ferrous sulfate 325 (65 Fe) mg tablet Take 325 mg by mouth daily with breakfast      Multiple Vitamin (MULTIVITAMIN) tablet Take 1 tablet by mouth daily      cyclobenzaprine (FLEXERIL) 10 mg tablet Take 1 tablet (10 mg total) by mouth daily at bedtime (Patient not taking: Reported on 3/11/2021) 7 tablet 0    meloxicam (MOBIC) 15 mg tablet Take 1 tablet (15 mg total) by mouth daily (Patient not taking: Reported on 9/7/2021) 15 tablet 1     No current facility-administered medications for this visit  No Known Allergies    Review of Systems   Constitutional: Positive for fatigue (mild)  Negative for chills and fever  HENT: Positive for congestion (mild)  Negative for rhinorrhea and sore throat  Respiratory: Negative for cough, chest tightness and shortness of breath  Cardiovascular: Negative for chest pain and leg swelling  Gastrointestinal: Negative for abdominal pain, diarrhea, nausea and vomiting  Genitourinary: Negative for difficulty urinating  Musculoskeletal: Negative for myalgias  Skin: Negative for rash  Neurological: Negative for dizziness, light-headedness and headaches  Psychiatric/Behavioral: Negative for sleep disturbance  Objective:    Vitals:    09/07/21 0817   Weight: 97 5 kg (215 lb)   Height: 5' 2" (1 575 m)       Physical Exam  Constitutional:       General: She is not in acute distress  Appearance: She is well-developed  HENT:      Head: Normocephalic and atraumatic  Eyes:      General: No scleral icterus  Pulmonary:      Effort: Pulmonary effort is normal    Abdominal:      Palpations: Abdomen is soft  Comments: Per patient self palpation   Musculoskeletal:      Cervical back: Normal range of motion  Skin:     Coloration: Skin is not pale  Neurological:      Mental Status: She is alert and oriented to person, place, and time  Psychiatric:         Behavior: Behavior normal          Thought Content: Thought content normal          VIRTUAL VISIT DISCLAIMER    Anita Wood verbally agrees to participate in [employer]  Pt is aware that [employer] could be limited without vital signs or the ability to perform a full hands-on physical Montez Paling understands she or the provider may request at any time to terminate the video visit and request the patient to seek care or treatment in person

## 2021-09-07 NOTE — LETTER
September 7, 2021    Patient: Callie Closs  YOB: 1975  Date of Last Encounter: 6/20/2021      To whom it may concern: Callie Closs has tested positive for COVID-19 (Coronavirus)  She may return to work on 9/8/2021, which is 10 days from illness onset (provided symptoms are improving) and 24 hours without fever  She has no restrictions       Sincerely,         STEVE Rao

## 2021-10-08 ENCOUNTER — TELEPHONE (OUTPATIENT)
Dept: BARIATRICS | Facility: CLINIC | Age: 46
End: 2021-10-08

## 2021-12-07 ENCOUNTER — VBI (OUTPATIENT)
Dept: ADMINISTRATIVE | Facility: OTHER | Age: 46
End: 2021-12-07

## 2021-12-30 ENCOUNTER — HOSPITAL ENCOUNTER (OUTPATIENT)
Dept: RADIOLOGY | Age: 46
Discharge: HOME | End: 2021-12-30
Payer: COMMERCIAL

## 2021-12-30 VITALS — BODY MASS INDEX: 39.56 KG/M2 | HEIGHT: 62 IN | WEIGHT: 215 LBS

## 2021-12-30 DIAGNOSIS — Z12.31 SCREENING MAMMOGRAM, ENCOUNTER FOR: ICD-10-CM

## 2021-12-30 PROCEDURE — 77063 BREAST TOMOSYNTHESIS BI: CPT

## 2021-12-30 PROCEDURE — 77067 SCR MAMMO BI INCL CAD: CPT

## 2022-01-05 ENCOUNTER — OFFICE VISIT (OUTPATIENT)
Dept: URGENT CARE | Age: 47
End: 2022-01-05
Payer: COMMERCIAL

## 2022-01-05 VITALS
WEIGHT: 215 LBS | TEMPERATURE: 98.6 F | HEART RATE: 74 BPM | BODY MASS INDEX: 39.56 KG/M2 | RESPIRATION RATE: 22 BRPM | HEIGHT: 62 IN | OXYGEN SATURATION: 100 %

## 2022-01-05 DIAGNOSIS — J01.00 ACUTE NON-RECURRENT MAXILLARY SINUSITIS: Primary | ICD-10-CM

## 2022-01-05 PROCEDURE — 99213 OFFICE O/P EST LOW 20 MIN: CPT

## 2022-01-05 RX ORDER — AMOXICILLIN AND CLAVULANATE POTASSIUM 875; 125 MG/1; MG/1
1 TABLET, FILM COATED ORAL EVERY 12 HOURS SCHEDULED
Qty: 14 TABLET | Refills: 0 | Status: SHIPPED | OUTPATIENT
Start: 2022-01-05 | End: 2022-01-12

## 2022-01-05 NOTE — LETTER
January 5, 2022       Patient: Dada Paz   YOB: 1975   Date of Visit: 1/5/2022       To Whom it May Concern: Daniel Zaldivar was seen in my clinic on 1/5/2022  She may return to work on 1/6/2022           Sincerely,          Adriane Sandoval

## 2022-01-05 NOTE — PROGRESS NOTES
[facility]      NAME: Ángela Petit is a 42 y o  female  : 1975    MRN: [id number]  DATE: 2022  TIME: 2:02 PM    Assessment and Plan   Acute non-recurrent maxillary sinusitis [J01 00]  1  Acute non-recurrent maxillary sinusitis  amoxicillin-clavulanate (AUGMENTIN) 875-125 mg per tablet    CANCELED: COVID Only -Office Collect         Patient Instructions     Antibiotic as directed  Continue supportive care  Follow up with PCP in 3-5 days  Proceed to ER if symptoms worsen  Chief Complaint     Chief Complaint   Patient presents with    COVID-19     runny nose, cough, headache and congestion symptoms started 2021 went away and now have been worse since 1/3/2022  History of Present Illness     42 y o  F presents with complaint of sinus pressure, congestion, cough and headache x 11 days  Denies CP, SOB, fevers, chills, body aches, fatigue  Vaccinated x 2 for COVID  Denies exposure or sick contacts  Denies recent travel  Nonsmoker  No hx asthma  Review of Systems   Review of Systems   Constitutional: Negative for chills, fatigue and fever  HENT: Positive for congestion, sinus pressure and sinus pain  Negative for ear discharge, ear pain, rhinorrhea, sore throat and trouble swallowing  Eyes: Negative for photophobia and visual disturbance  Respiratory: Negative for cough, chest tightness, shortness of breath and wheezing  Cardiovascular: Negative for chest pain, palpitations and leg swelling  Gastrointestinal: Negative for abdominal pain, diarrhea, nausea and vomiting  Genitourinary: Negative for dysuria and flank pain  Musculoskeletal: Negative for arthralgias, back pain and myalgias  Skin: Negative for pallor  Neurological: Positive for headaches  Negative for dizziness  Psychiatric/Behavioral: Negative for sleep disturbance           Current Medications       Current Outpatient Medications:     buPROPion (WELLBUTRIN XL) 150 mg 24 hr tablet, TAKE 1 TABLET BY MOUTH TWICE A DAY, Disp: 180 tablet, Rfl: 1    CALCIUM PO, Take 1 each by mouth 3 (three) times a day  , Disp: , Rfl:     Multiple Vitamin (MULTIVITAMIN) tablet, Take 1 tablet by mouth daily, Disp: , Rfl:     amoxicillin-clavulanate (AUGMENTIN) 875-125 mg per tablet, Take 1 tablet by mouth every 12 (twelve) hours for 7 days, Disp: 14 tablet, Rfl: 0    cyclobenzaprine (FLEXERIL) 10 mg tablet, Take 1 tablet (10 mg total) by mouth daily at bedtime (Patient not taking: Reported on 3/11/2021), Disp: 7 tablet, Rfl: 0    ferrous sulfate 325 (65 Fe) mg tablet, Take 325 mg by mouth daily with breakfast (Patient not taking: Reported on 1/5/2022 ), Disp: , Rfl:     meloxicam (MOBIC) 15 mg tablet, Take 1 tablet (15 mg total) by mouth daily (Patient not taking: Reported on 9/7/2021), Disp: 15 tablet, Rfl: 1    Current Allergies     Allergies as of 01/05/2022    (No Known Allergies)            The following portions of the patient's history were reviewed and updated as appropriate: allergies, current medications, past family history, past medical history, past social history, past surgical history and problem list      Past Medical History:   Diagnosis Date    Anemia     Bariatric surgery status     CPAP (continuous positive airway pressure) dependence     Depression     History of anxiety     Left knee pain     "occas"    Low back pain     "mayra if standing for a while"    Morbid obesity (HCC)     Motion sickness     Obesity     PONV (postoperative nausea and vomiting)     Postgastrectomy malabsorption     Prediabetes     Right ankle pain     " occas"    Shortness of breath     "exertional"    Sleep apnea     Mild    Walks frequently     for exercise    Wears glasses        Past Surgical History:   Procedure Laterality Date    ANKLE SURGERY Bilateral     Right with screws and plate implant   Left ACL repair Resolved: 1997    ANTERIOR CRUCIATE LIGAMENT REPAIR Left     screws implanted    BARIATRIC SURGERY  05/08/2018    KNEE CARTILAGE SURGERY      right    KNEE SURGERY Left     [place] EGD TRANSORAL BIOPSY SINGLE/MULTIPLE N/A 3/21/2018    Procedure: ESOPHAGOGASTRODUODENOSCOPY (EGD) with bx;  Surgeon: Marylou Iyer MD;  [facility]; Service: Bariatrics    [place] LAP, ELIN RESTRICT PROC, LONGITUDINAL GASTRECTOMY N/A 5/8/2018    Procedure: LAPAROSCOPIC SLEEVE GASTRECTOMY WITH ROBOTICS;  Surgeon: Marylou Iyer MD;  [facility];  Service: Bariatrics    REDUCTION MAMMAPLASTY Bilateral     Resolved: 2004    WISDOM TOOTH EXTRACTION         Family History   Problem Relation Age of Onset    Hypertension Mother     Heart attack Mother     Diabetes type II Mother         Mellitus    Hypertension Father     Diabetes type II Father         Mellitus    No Known Problems Sister     No Known Problems Maternal Grandmother     No Known Problems Maternal Grandfather     No Known Problems Paternal Grandmother     No Known Problems Paternal Grandfather     No Known Problems Sister     No Known Problems Paternal Aunt          Medications have been verified  Objective   Pulse 74   Temp 98 6 °F (37 °C)   Resp 22   Ht 5' 2" (1 575 m)   Wt 97 5 kg (215 lb)   SpO2 100%   BMI 39 32 kg/m²   No LMP recorded  Physical Exam     Physical Exam  Vitals reviewed  Constitutional:       General: She is not in acute distress  Appearance: Normal appearance  She is not toxic-appearing  HENT:      Head: Normocephalic  Right Ear: Tympanic membrane normal       Left Ear: Tympanic membrane normal       Nose: No congestion or rhinorrhea  Right Sinus: Maxillary sinus tenderness present  No frontal sinus tenderness  Left Sinus: Maxillary sinus tenderness present  No frontal sinus tenderness  Mouth/Throat:      Pharynx: Posterior oropharyngeal erythema present  No oropharyngeal exudate  Tonsils: No tonsillar exudate or tonsillar abscesses     Eyes:      Pupils: Pupils are equal, round, and reactive to light  Cardiovascular:      Rate and Rhythm: Normal rate and regular rhythm  Pulses: Normal pulses  Heart sounds: Normal heart sounds  Pulmonary:      Effort: Pulmonary effort is normal  No tachypnea or respiratory distress  Breath sounds: Normal breath sounds and air entry  No decreased breath sounds, wheezing, rhonchi or rales  Abdominal:      General: Bowel sounds are normal       Palpations: Abdomen is soft  Musculoskeletal:         General: Normal range of motion  Cervical back: Normal range of motion  Lymphadenopathy:      Cervical: No cervical adenopathy  Skin:     General: Skin is warm and dry  Neurological:      General: No focal deficit present  Mental Status: She is alert

## 2022-01-05 NOTE — PATIENT INSTRUCTIONS
Sinusitis     Take antibiotic as prescribed for full course  Supportive care as directed  Tylenol or Motrin for headaches, body aches, fevers  Encourage adequate fluid intake  Follow up with PCP in 3-5 days  Proceed to ER if symptoms worsen  AMBULATORY CARE:   Sinusitis  is inflammation or infection of your sinuses  Sinusitis is most often caused by a virus  Acute sinusitis may last up to 12 weeks  Chronic sinusitis lasts longer than 12 weeks  Recurrent sinusitis means you have 4 or more infections in 1 year  Common signs and symptoms:   · Fever    · Pain, pressure, redness, or swelling around the forehead, cheeks, or eyes    · Thick yellow or green discharge from your nose    · Tenderness when you touch your face over your sinuses    · Dry cough that happens mostly at night or when you lie down    · Headache and face pain that is worse when you lean forward    · Tooth pain, or pain when you chew    Seek care immediately if:   · You have trouble breathing or wheezing that is getting worse  · You have a stiff neck, a fever, or a bad headache  · You cannot open your eye  · Your eyeball bulges out or you cannot move your eye  · You are more sleepy than normal, or you notice changes in your ability to think, move, or talk  · You have swelling of your forehead or scalp  Call your doctor if:   · You have vision changes, such as double vision  · Your eye and eyelid are red, swollen, and painful  · Your symptoms do not improve or go away after 10 days  · You have nausea and are vomiting  · Your nose is bleeding  · You have questions or concerns about your condition or care  Medicines: Your symptoms may go away on their own  Your healthcare provider may recommend watchful waiting for up to 10 days before starting antibiotics  You may need any of the following:  · Acetaminophen  decreases pain and fever  It is available without a doctor's order   Ask how much to take and how often to take it  Follow directions  Read the labels of all other medicines you are using to see if they also contain acetaminophen, or ask your doctor or pharmacist  Acetaminophen can cause liver damage if not taken correctly  Do not use more than 4 grams (4,000 milligrams) total of acetaminophen in one day  · NSAIDs , such as ibuprofen, help decrease swelling, pain, and fever  This medicine is available with or without a doctor's order  NSAIDs can cause stomach bleeding or kidney problems in certain people  If you take blood thinner medicine, always ask your healthcare provider if NSAIDs are safe for you  Always read the medicine label and follow directions  · Nasal steroid sprays  may help decrease inflammation in your nose and sinuses  · Decongestants  help reduce swelling and drain mucus in the nose and sinuses  They may help you breathe easier  · Antihistamines  help dry mucus in the nose and relieve sneezing  · Antibiotics  help treat or prevent a bacterial infection  Self-care:   · Rinse your sinuses as directed  Use a sinus rinse device to rinse your nasal passages with a saline (salt water) solution or distilled water  Do not use tap water  This will help thin the mucus in your nose and rinse away pollen and dirt  It will also help reduce swelling so you can breathe normally  · Use a humidifier  to increase air moisture in your home  This may make it easier for you to breathe and help decrease your cough  · Sleep with your head elevated  Place an extra pillow under your head before you go to sleep to help your sinuses drain  · Drink liquids as directed  Ask your healthcare provider how much liquid to drink each day and which liquids are best for you  Liquids will thin the mucus in your nose and help it drain  Avoid drinks that contain alcohol or caffeine  · Do not smoke, and avoid secondhand smoke    Nicotine and other chemicals in cigarettes and cigars can make your symptoms worse  Ask your healthcare provider for information if you currently smoke and need help to quit  E-cigarettes or smokeless tobacco still contain nicotine  Talk to your healthcare provider before you use these products  Prevent the spread of germs:   · Wash your hands often with soap and water  Wash your hands after you use the bathroom, change a child's diaper, or sneeze  Wash your hands before you prepare or eat food  · Stay away from people who are sick  Some germs spread easily and quickly through contact  Follow up with your doctor as directed: You may be referred to an ear, nose, and throat specialist  Write down your questions so you remember to ask them during your visits  © Copyright [employer] 2021 Information is for End User's use only and may not be sold, redistributed or otherwise used for commercial purposes  All illustrations and images included in CareNotes® are the copyrighted property of [employer] Inc  or Ramon Abdalla   The above information is an  only  It is not intended as medical advice for individual conditions or treatments  Talk to your doctor, nurse or pharmacist before following any medical regimen to see if it is safe and effective for you

## 2022-01-11 ENCOUNTER — OFFICE VISIT (OUTPATIENT)
Dept: OBGYN CLINIC | Facility: CLINIC | Age: 47
End: 2022-01-11
Payer: COMMERCIAL

## 2022-01-11 VITALS
HEIGHT: 63 IN | WEIGHT: 221 LBS | SYSTOLIC BLOOD PRESSURE: 112 MMHG | DIASTOLIC BLOOD PRESSURE: 70 MMHG | BODY MASS INDEX: 39.16 KG/M2

## 2022-01-11 DIAGNOSIS — Z12.4 CERVICAL CANCER SCREENING: ICD-10-CM

## 2022-01-11 DIAGNOSIS — Z01.419 ENCOUNTER FOR WELL WOMAN EXAM: Primary | ICD-10-CM

## 2022-01-11 DIAGNOSIS — Z12.39 ENCOUNTER FOR SCREENING BREAST EXAMINATION: ICD-10-CM

## 2022-01-11 DIAGNOSIS — Z11.51 SCREENING FOR HPV (HUMAN PAPILLOMAVIRUS): ICD-10-CM

## 2022-01-11 DIAGNOSIS — Z12.31 ENCOUNTER FOR SCREENING MAMMOGRAM FOR BREAST CANCER: ICD-10-CM

## 2022-01-11 DIAGNOSIS — Z01.419 ROUTINE GYNECOLOGICAL EXAMINATION: ICD-10-CM

## 2022-01-11 PROCEDURE — S0610 ANNUAL GYNECOLOGICAL EXAMINA: HCPCS | Performed by: PHYSICIAN ASSISTANT

## 2022-01-11 PROCEDURE — G0145 SCR C/V CYTO,THINLAYER,RESCR: HCPCS | Performed by: PHYSICIAN ASSISTANT

## 2022-01-11 PROCEDURE — G0476 HPV COMBO ASSAY CA SCREEN: HCPCS | Performed by: PHYSICIAN ASSISTANT

## 2022-01-11 NOTE — PROGRESS NOTES
The patient is here for a yearly  The patient is due for a pap smear  The patient has been having irregular periods for the past year  She will skip periods and then have her period for almost a month  Her last period she bled for a month and had steady bleeding  The patient has pelvic pain on her left side sometimes when she has the bleeding  The patient feels fatigued when she is bleeding  No vaginal, bowel, bladder or breast problems

## 2022-01-11 NOTE — PROGRESS NOTES
Assessment/Plan:    No problem-specific Assessment & Plan notes found for this encounter  Diagnoses and all orders for this visit:    Encounter for well woman exam    Encounter for screening breast examination    Encounter for screening mammogram for breast cancer  -     Mammo screening bilateral w 3d & cad; Future    Cervical cancer screening          Subjective:      Patient ID: Emma Cárdenas is a 42 y o  female  Pt presents as a new old patient for her annual exam today--  She has no complaints except irregular cycles--coming monthly but not predictable and lasting longer  She has no pelvic pain  Bowel and bladder are regular  No breast concerns today  Last mammo--12/21    No pap today  rx mammo  Observe and track cycles  Call with any problems        The following portions of the patient's history were reviewed and updated as appropriate: allergies, current medications, past family history, past medical history, past social history, past surgical history and problem list     Review of Systems   Constitutional: Negative for chills, fever and unexpected weight change  Gastrointestinal: Negative for abdominal pain, blood in stool, constipation and diarrhea  Genitourinary: Negative  Objective:      /70   Ht 5' 3 39" (1 61 m)   Wt 100 kg (221 lb)   LMP 12/05/2021 (Exact Date)   BMI 38 67 kg/m²          Physical Exam  Vitals and nursing note reviewed  Constitutional:       Appearance: She is well-developed  HENT:      Head: Normocephalic and atraumatic  Chest:   Breasts:      Right: No inverted nipple, mass, nipple discharge or skin change  Left: No inverted nipple, mass, nipple discharge or skin change  Abdominal:      Palpations: Abdomen is soft  Genitourinary:     Exam position: Supine  Labia:         Right: No rash, tenderness or lesion  Left: No rash, tenderness or lesion         Vagina: Normal       Cervix: No cervical motion tenderness, discharge or friability  Adnexa:         Right: No mass, tenderness or fullness  Left: No mass, tenderness or fullness  Musculoskeletal:      Cervical back: Normal range of motion  Lymphadenopathy:      Lower Body: No right inguinal adenopathy  No left inguinal adenopathy

## 2022-01-15 LAB
HPV HR 12 DNA CVX QL NAA+PROBE: NEGATIVE
HPV16 DNA CVX QL NAA+PROBE: NEGATIVE
HPV18 DNA CVX QL NAA+PROBE: NEGATIVE

## 2022-01-19 LAB
LAB AP GYN PRIMARY INTERPRETATION: NORMAL
Lab: NORMAL

## 2022-02-04 ENCOUNTER — TELEPHONE (OUTPATIENT)
Dept: BARIATRICS | Facility: CLINIC | Age: 47
End: 2022-02-04

## 2022-02-10 ENCOUNTER — OFFICE VISIT (OUTPATIENT)
Dept: BARIATRICS | Facility: CLINIC | Age: 47
End: 2022-02-10
Payer: COMMERCIAL

## 2022-02-10 VITALS
SYSTOLIC BLOOD PRESSURE: 124 MMHG | OXYGEN SATURATION: 98 % | DIASTOLIC BLOOD PRESSURE: 70 MMHG | HEIGHT: 63 IN | BODY MASS INDEX: 38.09 KG/M2 | TEMPERATURE: 98 F | HEART RATE: 94 BPM | WEIGHT: 215 LBS

## 2022-02-10 DIAGNOSIS — K91.2 POSTSURGICAL MALABSORPTION: ICD-10-CM

## 2022-02-10 DIAGNOSIS — Z98.84 BARIATRIC SURGERY STATUS: ICD-10-CM

## 2022-02-10 DIAGNOSIS — Z48.815 ENCOUNTER FOR SURGICAL AFTERCARE FOLLOWING SURGERY OF DIGESTIVE SYSTEM: Primary | ICD-10-CM

## 2022-02-10 DIAGNOSIS — E61.1 IRON DEFICIENCY: ICD-10-CM

## 2022-02-10 DIAGNOSIS — E55.9 VITAMIN D DEFICIENCY: ICD-10-CM

## 2022-02-10 DIAGNOSIS — E66.9 OBESITY, CLASS II, BMI 35-39.9: ICD-10-CM

## 2022-02-10 DIAGNOSIS — K21.9 GASTROESOPHAGEAL REFLUX DISEASE WITHOUT ESOPHAGITIS: ICD-10-CM

## 2022-02-10 PROCEDURE — 99214 OFFICE O/P EST MOD 30 MIN: CPT | Performed by: NURSE PRACTITIONER

## 2022-02-10 NOTE — PATIENT INSTRUCTIONS
PLAN:   - Food log to avoid foods that may precipitate heart burn  - Order UGI   - EGD with BRAVO   - Can take famotidine/pepcid - 20 mg PO BID PRN  - lab checks and adjust if any deficiencies   - Follow up with dietitian and         · Follow-up in 1 year  We kindly ask that your arrive 15 minutes before your scheduled appointment time with your provider to allow our staff to room you, get your vital signs and update your chart  · Get lab work done prior to annual visit  Please call the office if you need a script  It is recommended to check with your insurance BEFORE getting labs done to make sure they are covered by your policy  · Call our office if you have any problems with abdominal pain especially associated with fever, chills, nausea, vomiting or any other concerns  · All  Post-bariatric surgery patients should be aware that very small quantities of any alcohol can cause impairment and it is very possible not to feel the effect  The effect can be in the system for several hours  It is also a stomach irritant  · It is advised to AVOID alcohol, Nonsteroidal antiinflammatory drugs (NSAIDS) and nicotine of all forms   Any of these can cause stomach irritation/pain  · Discussed the effects of alcohol on a bariatric patient and the increased impairment risk  · Keep up the good work!

## 2022-02-10 NOTE — PROGRESS NOTES
ADDENDUM:     UGI findings 06/02/2022-   "FINDINGS:     The esophagus is normal in caliber  Esophageal motility is normal and emptying of contrast from the esophagus is prompt  There is no evidence of discrete mucosal mass, ulceration or fold thickening identified      There is mild narrowing of the gastric lumen in keeping with history of sleeve gastrectomy  The visualized gastric mucosa is unremarkable  No discrete penetrating ulcers or masses are seen      Contrast empties promptly into the duodenum  The duodenum is normal in caliber  The ligament of Treitz/duodenojejunal junction lies in a normal position      A small amount of gastroesophageal reflux was observed during the exam      There is a small reducible hiatal hernia  There is a small luminal outpouching seen along the left aspect of the hiatal hernia which is favored to be postsurgical in etiology      IMPRESSION:  1  Postsurgical changes from prior sleeve gastrectomy with small hiatal hernia  There is a small luminal outpouching along the left aspect of the hernia which is favored to be postsurgical in etiology  2   Mild gastric esophageal reflux was noted during the exam "    PLAN:   - Will plan for EGD with BRAVO to assess reflux and for screening purposes  Denies nickel sensitivity  Made aware of stopping all antacids a week prior to scope  Assessment/Plan:     Patient ID: Garth Goodrich is a 42 y o  female  Bariatric Surgery Status    -s/p Vertical Sleeve Gastrectomy with Dr Enrique Peralta on 05/08/2018  Presents to the office today for OD annual and having heartburn worsening in the past month  Heartburn occurring intermittently once or twice throughout the week  Burning and heavy feeling mid-abdomen with feeling of "food is stuck on the right side of my throat " Has started to take tums in the past month which is effective  Worsening with laying flat shortly after eating  Unsure which foods are precipitating this       Denies use of Smoking or NSAIDs use  Drinks 16-20 oz of caffeinated coffee  Stress at work at times  Overall doing well, tolerating a regular diet  Denies having associated nausea, vomiting, constipation, diarrhea, abdominal pain, changes in her bowels  PLAN:   - Food log to avoid foods that may precipitate heart burn  - Discussed with patient there is a possibility weight regain may contribute to her symptoms  Encourage healthy eating/lifestyle, adequate protein and fluid intake  Recommend RD and SW visit to get back to basics to assist with weight lost  Patient is agreeable  - Order UGI to assess anatomy and significance of her reflux  Dependent on results will schedule for patient to have EGD with or without bravo  Patient still needs EGD for screening purposes  - Denies nickel sensitivity  Discussed with patient no antacid use for 7 days prior to EGD if BRAVO is needed  Patient is agreeable to plan  - Can take famotidine/pepcid - 20 mg PO BID PRN if tums if not helpful  - Continue with MVI daily  - lab checks and adjust if any deficiencies   - Follow up in 1 year for annual visit  · Continued/Maintain healthy weight loss with good nutrition intakes  · Adequate hydration with at least 64oz  fluid intake  · Follow diet as discussed  · Follow vitamin and mineral recommendations as reviewed with you  · Exercise as tolerated  · Colonoscopy referral made: not of age range  · Mammogram: UTD  · Sleeve screening - due  Will order UGI to assess reflux and schedule for EGD  · Follow-up in 1 year  We kindly ask that your arrive 15 minutes before your scheduled appointment time with your provider to allow our staff to room you, get your vital signs and update your chart  · Get lab work done prior to annual visit  Please call the office if you need a script  It is recommended to check with your insurance BEFORE getting labs done to make sure they are covered by your policy        · Call our office if you have any problems with abdominal pain especially associated with fever, chills, nausea, vomiting or any other concerns  · All  Post-bariatric surgery patients should be aware that very small quantities of any alcohol can cause impairment and it is very possible not to feel the effect  The effect can be in the system for several hours  It is also a stomach irritant  · It is advised to AVOID alcohol, Nonsteroidal antiinflammatory drugs (NSAIDS) and nicotine of all forms   Any of these can cause stomach irritation/pain  · Discussed the effects of alcohol on a bariatric patient and the increased impairment risk  · Keep up the good work! Postsurgical Malabsorption   -At risk for malabsorption of vitamins/minerals secondary to malabsorption and restriction of intake from bariatric surgery  -Currently taking adequate postop bariatric surgery vitamin supplementation  -Last set of bariatric labs completed on 2019 and showed low and high levels of vitamin D  Will check full panel again    -Next set of bariatric labs ordered for approximately 2 weeks  -Patient received education about the importance of adhering to a lifelong supplementation regimen to avoid vitamin/mineral deficiencies      Diagnoses and all orders for this visit:    Encounter for surgical aftercare following surgery of digestive system  -     Ferritin; Future  -     Folate; Future  -     Iron Saturation %; Future  -     PTH, intact; Future  -     Vitamin A; Future  -     Vitamin B1, whole blood; Future  -     Vitamin B12; Future  -     Vitamin D 25 hydroxy; Future  -     Zinc; Future  -     FL UPPER GI UGI; Future    Bariatric surgery status  -     Ferritin; Future  -     Folate; Future  -     Iron Saturation %; Future  -     PTH, intact; Future  -     Vitamin A; Future  -     Vitamin B1, whole blood; Future  -     Vitamin B12; Future  -     Vitamin D 25 hydroxy;  Future  -     Zinc; Future  -     FL UPPER GI UGI; Future    Postsurgical malabsorption  -     Ferritin; Future  -     Folate; Future  -     Iron Saturation %; Future  -     PTH, intact; Future  -     Vitamin A; Future  -     Vitamin B1, whole blood; Future  -     Vitamin B12; Future  -     Vitamin D 25 hydroxy; Future  -     Zinc; Future  -     FL UPPER GI UGI; Future    Obesity, Class II, BMI 35-39 9  -     Ferritin; Future  -     Folate; Future  -     Iron Saturation %; Future  -     PTH, intact; Future  -     Vitamin A; Future  -     Vitamin B1, whole blood; Future  -     Vitamin B12; Future  -     Vitamin D 25 hydroxy; Future  -     Zinc; Future  -     FL UPPER GI UGI; Future    Vitamin D deficiency  -     Vitamin D 25 hydroxy; Future    Gastroesophageal reflux disease without esophagitis  -     FL UPPER GI UGI; Future    Iron deficiency  -     Ferritin; Future  -     Iron Saturation %; Future         Subjective:      Patient ID: Leesa Alves is a 42 y o  female  -s/p Vertical Sleeve Gastrectomy with Dr Jennifer Rodriguez on 05/08/2018  Presents to the office today for OD annual and having heartburn worsening in the past month  Heartburn occurring intermittently once or twice throughout the week  Burning and heavy feeling mid-abdomen with feeling of "food is stuck on the right side of my throat " Has started to take tums in the past month which is effective  Worsening with laying flat shortly after eating  Unsure which foods are precipitating this  Denies use of Smoking or NSAIDs use  Drinks 16-20 oz of caffeinated coffee  Stress at work at times  Overall doing well, tolerating a regular diet  Denies having associated nausea, vomiting, constipation, diarrhea, abdominal pain, changes in her bowels  Initial: 246 5 lbs  Current: 215 lbs  EWL: (Weight loss is ahead of schedule at this post surgical period )  Gary: 169 5 lbs  Current BMI is Body mass index is 38 09 kg/m²      · Tolerating a regular diet-yes  · Eating at least 60 grams of protein per day-yes  · Following 30/60 minute rule with liquids-occasionally - advise to try at least the 30/30 and increase  · Drinking at least 64 ounces of fluid per day-yes  · Drinking carbonated beverages-no  · Sufficient exercise-limited due to fatigue  · Using NSAIDs regularly-no  · Using nicotine-no  · Using alcohol-no  · Supplements: Multivitamins + calcium use    · EWL is 30%, which places the patient ahead of schedule for expected post surgical weight loss at this time  The following portions of the patient's history were reviewed and updated as appropriate: allergies, current medications, past family history, past medical history, past social history, past surgical history and problem list     Review of Systems   Constitutional: Positive for fatigue  Respiratory: Positive for shortness of breath (with mask use  )  Cardiovascular: Negative  Gastrointestinal: Negative for abdominal pain, nausea and vomiting         + REFLUX   Musculoskeletal: Positive for arthralgias (right elbow - refer to PCP )  Skin: Negative  Neurological: Negative  Psychiatric/Behavioral: Negative  Objective:    /70 (BP Location: Left arm, Patient Position: Sitting)   Pulse 94   Temp 98 °F (36 7 °C)   Ht 5' 3" (1 6 m)   Wt 97 5 kg (215 lb)   SpO2 98%   BMI 38 09 kg/m²      Physical Exam  Vitals and nursing note reviewed  Constitutional:       Appearance: Normal appearance  She is obese  Cardiovascular:      Rate and Rhythm: Normal rate and regular rhythm  Pulses: Normal pulses  Heart sounds: Normal heart sounds  Pulmonary:      Effort: Pulmonary effort is normal       Breath sounds: Normal breath sounds  Abdominal:      General: Bowel sounds are normal       Palpations: Abdomen is soft  Musculoskeletal:         General: Normal range of motion  Skin:     General: Skin is warm and dry  Neurological:      General: No focal deficit present        Mental Status: She is alert and oriented to person, place, and time  Psychiatric:         Mood and Affect: Mood normal          Behavior: Behavior normal          Thought Content:  Thought content normal          Judgment: Judgment normal

## 2022-02-11 ENCOUNTER — TELEPHONE (OUTPATIENT)
Dept: BARIATRICS | Facility: CLINIC | Age: 47
End: 2022-02-11

## 2022-02-11 ENCOUNTER — APPOINTMENT (OUTPATIENT)
Dept: LAB | Age: 47
End: 2022-02-11
Payer: COMMERCIAL

## 2022-02-11 DIAGNOSIS — Z48.815 ENCOUNTER FOR SURGICAL AFTERCARE FOLLOWING SURGERY OF DIGESTIVE SYSTEM: ICD-10-CM

## 2022-02-11 DIAGNOSIS — Z98.84 BARIATRIC SURGERY STATUS: ICD-10-CM

## 2022-02-11 DIAGNOSIS — K91.2 POSTSURGICAL MALABSORPTION: ICD-10-CM

## 2022-02-11 DIAGNOSIS — E55.9 VITAMIN D DEFICIENCY: ICD-10-CM

## 2022-02-11 DIAGNOSIS — E55.9 VITAMIN D DEFICIENCY: Primary | ICD-10-CM

## 2022-02-11 DIAGNOSIS — D50.9 IRON DEFICIENCY ANEMIA: ICD-10-CM

## 2022-02-11 DIAGNOSIS — E61.1 IRON DEFICIENCY: ICD-10-CM

## 2022-02-11 DIAGNOSIS — Z98.84 BARIATRIC SURGERY STATUS: Primary | ICD-10-CM

## 2022-02-11 DIAGNOSIS — Z00.00 ANNUAL PHYSICAL EXAM: ICD-10-CM

## 2022-02-11 DIAGNOSIS — E66.9 OBESITY, CLASS II, BMI 35-39.9: ICD-10-CM

## 2022-02-11 LAB
25(OH)D3 SERPL-MCNC: 18.4 NG/ML (ref 30–100)
ALBUMIN SERPL BCP-MCNC: 3.8 G/DL (ref 3.5–5)
ALP SERPL-CCNC: 69 U/L (ref 46–116)
ALT SERPL W P-5'-P-CCNC: 22 U/L (ref 12–78)
ANION GAP SERPL CALCULATED.3IONS-SCNC: 8 MMOL/L (ref 4–13)
AST SERPL W P-5'-P-CCNC: 12 U/L (ref 5–45)
BASOPHILS # BLD AUTO: 0.06 THOUSANDS/ΜL (ref 0–0.1)
BASOPHILS NFR BLD AUTO: 1 % (ref 0–1)
BILIRUB SERPL-MCNC: 0.48 MG/DL (ref 0.2–1)
BUN SERPL-MCNC: 13 MG/DL (ref 5–25)
CALCIUM SERPL-MCNC: 9.1 MG/DL (ref 8.3–10.1)
CHLORIDE SERPL-SCNC: 108 MMOL/L (ref 100–108)
CHOLEST SERPL-MCNC: 214 MG/DL
CO2 SERPL-SCNC: 22 MMOL/L (ref 21–32)
CREAT SERPL-MCNC: 0.87 MG/DL (ref 0.6–1.3)
EOSINOPHIL # BLD AUTO: 0.23 THOUSAND/ΜL (ref 0–0.61)
EOSINOPHIL NFR BLD AUTO: 4 % (ref 0–6)
ERYTHROCYTE [DISTWIDTH] IN BLOOD BY AUTOMATED COUNT: 16.7 % (ref 11.6–15.1)
FERRITIN SERPL-MCNC: 3 NG/ML (ref 8–388)
FOLATE SERPL-MCNC: 11.3 NG/ML (ref 3.1–17.5)
GFR SERPL CREATININE-BSD FRML MDRD: 81 ML/MIN/1.73SQ M
GLUCOSE P FAST SERPL-MCNC: 85 MG/DL (ref 65–99)
HCT VFR BLD AUTO: 29.6 % (ref 34.8–46.1)
HDLC SERPL-MCNC: 55 MG/DL
HGB BLD-MCNC: 7.9 G/DL (ref 11.5–15.4)
IMM GRANULOCYTES # BLD AUTO: 0.02 THOUSAND/UL (ref 0–0.2)
IMM GRANULOCYTES NFR BLD AUTO: 0 % (ref 0–2)
IRON SATN MFR SERPL: 2 % (ref 15–50)
IRON SERPL-MCNC: 12 UG/DL (ref 50–170)
LDLC SERPL CALC-MCNC: 143 MG/DL (ref 0–100)
LYMPHOCYTES # BLD AUTO: 2.28 THOUSANDS/ΜL (ref 0.6–4.47)
LYMPHOCYTES NFR BLD AUTO: 39 % (ref 14–44)
MCH RBC QN AUTO: 18 PG (ref 26.8–34.3)
MCHC RBC AUTO-ENTMCNC: 26.7 G/DL (ref 31.4–37.4)
MCV RBC AUTO: 68 FL (ref 82–98)
MONOCYTES # BLD AUTO: 0.35 THOUSAND/ΜL (ref 0.17–1.22)
MONOCYTES NFR BLD AUTO: 6 % (ref 4–12)
NEUTROPHILS # BLD AUTO: 2.98 THOUSANDS/ΜL (ref 1.85–7.62)
NEUTS SEG NFR BLD AUTO: 50 % (ref 43–75)
NONHDLC SERPL-MCNC: 159 MG/DL
NRBC BLD AUTO-RTO: 0 /100 WBCS
PLATELET # BLD AUTO: 481 THOUSANDS/UL (ref 149–390)
PMV BLD AUTO: 9.5 FL (ref 8.9–12.7)
POTASSIUM SERPL-SCNC: 4 MMOL/L (ref 3.5–5.3)
PROT SERPL-MCNC: 7.5 G/DL (ref 6.4–8.2)
PTH-INTACT SERPL-MCNC: 138.9 PG/ML (ref 18.4–80.1)
RBC # BLD AUTO: 4.38 MILLION/UL (ref 3.81–5.12)
SODIUM SERPL-SCNC: 138 MMOL/L (ref 136–145)
TIBC SERPL-MCNC: 558 UG/DL (ref 250–450)
TRIGL SERPL-MCNC: 82 MG/DL
TSH SERPL DL<=0.05 MIU/L-ACNC: 2.55 UIU/ML (ref 0.36–3.74)
VIT B12 SERPL-MCNC: 385 PG/ML (ref 100–900)
WBC # BLD AUTO: 5.92 THOUSAND/UL (ref 4.31–10.16)

## 2022-02-11 PROCEDURE — 80053 COMPREHEN METABOLIC PANEL: CPT

## 2022-02-11 PROCEDURE — 82306 VITAMIN D 25 HYDROXY: CPT

## 2022-02-11 PROCEDURE — 80061 LIPID PANEL: CPT

## 2022-02-11 PROCEDURE — 84590 ASSAY OF VITAMIN A: CPT

## 2022-02-11 PROCEDURE — 84425 ASSAY OF VITAMIN B-1: CPT

## 2022-02-11 PROCEDURE — 83970 ASSAY OF PARATHORMONE: CPT

## 2022-02-11 PROCEDURE — 82746 ASSAY OF FOLIC ACID SERUM: CPT

## 2022-02-11 PROCEDURE — 84443 ASSAY THYROID STIM HORMONE: CPT

## 2022-02-11 PROCEDURE — 82728 ASSAY OF FERRITIN: CPT

## 2022-02-11 PROCEDURE — 84630 ASSAY OF ZINC: CPT

## 2022-02-11 PROCEDURE — 83540 ASSAY OF IRON: CPT

## 2022-02-11 PROCEDURE — 82607 VITAMIN B-12: CPT

## 2022-02-11 PROCEDURE — 36415 COLL VENOUS BLD VENIPUNCTURE: CPT

## 2022-02-11 PROCEDURE — 83550 IRON BINDING TEST: CPT

## 2022-02-11 PROCEDURE — 85025 COMPLETE CBC W/AUTO DIFF WBC: CPT

## 2022-02-11 RX ORDER — ERGOCALCIFEROL 1.25 MG/1
50000 CAPSULE ORAL 2 TIMES WEEKLY
Qty: 24 CAPSULE | Refills: 0 | Status: SHIPPED | OUTPATIENT
Start: 2022-02-14 | End: 2022-06-16

## 2022-02-11 NOTE — TELEPHONE ENCOUNTER
Patient called office to discuss lab results  Discussed patient's lab results at length in regards to HGB levels and Iron levels  Patient is asymptomatic at this time besides baseline fatigue and SOB with mask use  She denies - heart palpitations, chest pain, black/BRB color stool, vomitus, heavy period, dizziness, syncope episodes, or lightheadedness  I have provided patient's ER precautions if worsening in condition  At this time, patient appears stable - advise to start iron supplements - 65 mg PO QD x 2 weeks and increase to twice a day x 2-4 weeks and increase to three times a day as tolerated  Advise to start colace to prevent constipation  Have also referred patient to hematology  Discussed vitamin D levels - sent RX for 50,000 IU twice weekly x 12 weeks  Discussed Vitamin B12 levels slightly below 400 which is goal after bariatric surgery  Advised to start 500 mcg of B12 PO or SL  Labs will be rechecked in 4 months

## 2022-02-11 NOTE — TELEPHONE ENCOUNTER
I have attempted to call patient in regards to iron deficiency and low hgb levels  Unable to reach at this time  Voicemail was left with call back number  Will place orders for hematology consult and will discuss with patient to start on iron supplements ASAP

## 2022-02-14 ENCOUNTER — TELEPHONE (OUTPATIENT)
Dept: HEMATOLOGY ONCOLOGY | Facility: CLINIC | Age: 47
End: 2022-02-14

## 2022-02-14 NOTE — TELEPHONE ENCOUNTER
New Patient Intake Form   Patient Details: Rodrigo Juan  1975  [id number]    Appointment Information   Who is calling to schedule? Patient   If not self, what is the caller's name? Please put name of RBC nurse as well  Self   Referring provider Harsha Terrazas    What is the diagnosis? iron deficiency; anemia       Is there a confirmed tissue diagnosis?   no   Is there a biopsy ordered or pending? Please specify dates   no     Is patient aware of diagnosis? yes   Have you had any imaging or labs done? If yes, where? (If imaging done outside of [facility], please remind patient to bring a disk ) Yes 2/11/22     If imaging done at outside facility, did you instruct patient to obtain discs and bring to visit? na   Have you been seen by another Oncologist/Hematologist?  If so, who and where? no   Are the records in [facility] or Care Everywhere? yes   Does the patient have records at another facility/hospital? na   If yes, Name of facility, city and state where facility is located  Did you instruct patient to have records faxed to [facility] and provide [facility] number? na   Preferred [place]   Is the patient willing to be seen by another provider?   (This is for breast patients only) na   Miscellaneous Information: 2/24/22 @ 9:00am

## 2022-02-15 LAB — ZINC SERPL-MCNC: 77 UG/DL (ref 44–115)

## 2022-02-21 LAB — VIT A SERPL-MCNC: 22.2 UG/DL (ref 20.1–62)

## 2022-02-24 ENCOUNTER — CONSULT (OUTPATIENT)
Dept: HEMATOLOGY ONCOLOGY | Facility: CLINIC | Age: 47
End: 2022-02-24
Payer: COMMERCIAL

## 2022-02-24 VITALS
HEIGHT: 63 IN | TEMPERATURE: 98 F | WEIGHT: 220 LBS | DIASTOLIC BLOOD PRESSURE: 98 MMHG | RESPIRATION RATE: 17 BRPM | SYSTOLIC BLOOD PRESSURE: 140 MMHG | OXYGEN SATURATION: 100 % | BODY MASS INDEX: 38.98 KG/M2 | HEART RATE: 97 BPM

## 2022-02-24 DIAGNOSIS — E53.8 B12 DEFICIENCY: ICD-10-CM

## 2022-02-24 DIAGNOSIS — Z98.84 BARIATRIC SURGERY STATUS: ICD-10-CM

## 2022-02-24 DIAGNOSIS — E61.1 IRON DEFICIENCY: Primary | ICD-10-CM

## 2022-02-24 LAB — VIT B1 BLD-SCNC: 87.6 NMOL/L (ref 66.5–200)

## 2022-02-24 PROCEDURE — 1036F TOBACCO NON-USER: CPT | Performed by: PHYSICIAN ASSISTANT

## 2022-02-24 PROCEDURE — 3008F BODY MASS INDEX DOCD: CPT | Performed by: PHYSICIAN ASSISTANT

## 2022-02-24 PROCEDURE — 99204 OFFICE O/P NEW MOD 45 MIN: CPT | Performed by: PHYSICIAN ASSISTANT

## 2022-02-24 RX ORDER — SODIUM CHLORIDE 9 MG/ML
20 INJECTION, SOLUTION INTRAVENOUS ONCE
Status: CANCELLED | OUTPATIENT
Start: 2022-03-02

## 2022-02-24 NOTE — PROGRESS NOTES
Oncology Outpatient Consult Note  Huy Fox 42 y o  female MRN: @ Encounter: [id number]        Date:  2/24/2022      Assessment/ Plan:    1  Iron deficiency anemia which preceded gastric sleeve surgery which was performed May 2018 by Dr Tish Valverde  She does have menometrorrhagia  2/11/2022 hemoglobin 7 9, MCV of 68   platelets 091  Iron saturation 2%, ferritin 3  Vitamin B12 385  CMP normal   She has been taking oral iron which does cause some constipation and nausea  We discussed the possibility of IV iron  Potential side effects  of IV iron could include but may not be limited to:  change in taste, diarrhea, muscle cramps, nausea or vomiting, pain in the arms or legs, pain or burning sensation in the injection site, allergic reaction  The patient verbalized understanding and wishes to proceed  Venofer 300 milligrams x 8 doses requested  She is taking sublingual B12  She also is taking vitamin D 08318 units twice weekly  She is asked to follow-up in approximately 4 months with repeat CBC, vitamin B12 level, iron panel  We discussed the possibility of maintenance Venofer  HPI:  Huy Fox is a 42 y o  seen for initial consultation 2/24/2022 at the referral of Leesa Canseco accompanied by her  Garret Verma regarding iron deficiency  She has past medical history hypercholesterolemia, depression, anxiety, right shoulder pain    She status post vertical sleeve gastrectomy by Dr Tish Valverde 5/8/2018 5/9/2018 hemoglobin 9 7, MCV 77, white blood cell count 10 9, platelet count 780, iron saturation 8%, ferritin 37    10/13/2018 hemoglobin 11 9, MCV of 81, white blood cell count 6 82, platelet count 242, iron saturation 13%, ferritin 9    5/15/2019 hemoglobin 11 9, MCV of 80, white blood cell count 7 45, platelets 012, iron saturation 9%, ferritin 4    8/22/2019 iron saturation 25%, ferritin 9      10/22/2019 hemoglobin 12 5, MCV 83, white blood cell count 7 55, platelet count 423     2/11/2022 hemoglobin 7 9, MCV of 68, white blood cell count 5 92, 50% neutrophils, 39% lymphocytes, 6% monocytes, 4% eosinophils, platelets 742  Iron saturation 2%, ferritin 3  Vitamin B12 385  CMP normal     Last week she started sublingual vitamin B12  She also started vitamin D 73171 units twice weekly  Patient reports she is very fatigued, she does have ice chip Pica  Denies any hair loss, muscle cramping  She does have chronic heartburn  Denies any dysphagia or sensation of food getting stuck  Denies any GI or  symptoms  Denies any melena, hematochezia, hematuria  She does not utilize NSAIDs with any regularity  She has been taking oral iron which causes some nausea and constipation  She is a nonsmoker  Patient reports she has had iron deficiency anemia my whole life  menses are somewhat irregular  She can have month where she does not have periods and then heavy menses back in last up to a month  Test Results:      Labs:   Lab Results   Component Value Date    HGB 7 9 (L) 02/11/2022    HCT 29 6 (L) 02/11/2022    MCV 68 (L) 02/11/2022     (H) 02/11/2022    WBC 5 92 02/11/2022    NRBC 0 02/11/2022     Lab Results   Component Value Date    K 4 0 02/11/2022     02/11/2022    CO2 22 02/11/2022    BUN 13 02/11/2022    CREATININE 0 87 02/11/2022    GLUF 85 02/11/2022    CALCIUM 9 1 02/11/2022    AST 12 02/11/2022    ALT 22 02/11/2022    ALKPHOS 69 02/11/2022    EGFR 81 02/11/2022           Imaging:   No results found  ROS: As mentioned in HPI & Interval History otherwise 14 point ROS negative        Active Problems:   Patient Active Problem List   Diagnosis    Anxiety disorder    Depression    Hypercholesterolemia    Bariatric surgery status    Postsurgical malabsorption    Iron deficiency    Obesity, Class I, BMI 30-34 9    Gastroesophageal reflux disease without esophagitis    Vitamin D deficiency    Infected sebaceous cyst    Right shoulder pain    Neck pain on right side    Annual physical exam    Need for influenza vaccination    Influenza vaccination declined by patient       Past Medical History:   Past Medical History:   Diagnosis Date    Anemia     Bariatric surgery status     CPAP (continuous positive airway pressure) dependence     Depression     History of anxiety     Left knee pain     "occas"    Low back pain     "mayra if standing for a while"    Morbid obesity (HCC)     Motion sickness     Obesity     PONV (postoperative nausea and vomiting)     Postgastrectomy malabsorption     Prediabetes     Right ankle pain     " occas"    Shortness of breath     "exertional"    Sleep apnea     Mild    Walks frequently     for exercise    Wears glasses        Surgical History:   Past Surgical History:   Procedure Laterality Date    ANKLE SURGERY Bilateral     Right with screws and plate implant  Left ACL repair Resolved: [address] Left     screws implanted    BARIATRIC SURGERY  05/08/2018    KNEE CARTILAGE SURGERY      right    KNEE SURGERY Left     AR EGD TRANSORAL BIOPSY SINGLE/MULTIPLE N/A 3/21/2018    Procedure: ESOPHAGOGASTRODUODENOSCOPY (EGD) with bx;  Surgeon: Tammy Fried MD;  Location: [facility];   Service: Bariatrics    AR LAP, ELIN RESTRICT PROC, LONGITUDINAL GASTRECTOMY N/A 5/8/2018    Procedure: LAPAROSCOPIC SLEEVE GASTRECTOMY WITH ROBOTICS;  Surgeon: Tammy Fried MD;  Location: [facility];  Service: Bariatrics    REDUCTION MAMMAPLASTY Bilateral     Resolved: 2004    WISDOM TOOTH EXTRACTION         Family History:    Family History   Problem Relation Age of Onset    Hypertension Mother     Heart attack Mother     Diabetes type II Mother         Mellitus    Hypertension Father     Diabetes type II Father         Mellitus    No Known Problems Sister     No Known Problems Maternal Grandmother     No Known Problems Maternal Grandfather     No Known Problems Paternal Grandmother     No Known Problems Paternal Grandfather     No Known Problems Sister     No Known Problems Paternal Aunt        Cancer-related family history is not on file  Social History:   Social History     Socioeconomic History    Marital status: /Civil Union     Spouse name: Not on file    Number of children: Not on file    Years of education: Not on file    Highest education level: Not on file   Occupational History     Comment: [employer]   Tobacco Use    Smoking status: Never Smoker    Smokeless tobacco: Never Used   Vaping Use    Vaping Use: Never used   Substance and Sexual Activity    Alcohol use: Yes     Alcohol/week: 0 0 standard drinks     Comment: social     Drug use: No    Sexual activity: Yes     Partners: Male     Birth control/protection: None     Comment: Carrier of STD   Other Topics Concern    Not on file   Social History Narrative    Denied: History of Exercising Regularly     Social Determinants of Health     Financial Resource Strain: Not on file   Food Insecurity: Not on file   Transportation Needs: Not on file   Physical Activity: Not on file   Stress: Not on file   Social Connections: Not on file   Intimate Partner Violence: Not on file   Housing Stability: Not on file       Current Medications:   Current Outpatient Medications   Medication Sig Dispense Refill    buPROPion (WELLBUTRIN XL) 150 mg 24 hr tablet TAKE 1 TABLET BY MOUTH TWICE A  tablet 1    CALCIUM PO Take 1 each by mouth 3 (three) times a day        ergocalciferol (VITAMIN D2) 50,000 units Take 1 capsule (50,000 Units total) by mouth 2 (two) times a week 24 capsule 0    Multiple Vitamin (MULTIVITAMIN) tablet Take 1 tablet by mouth daily       No current facility-administered medications for this visit  Allergies: No Known Allergies      Physical Exam:    Body surface area is 2 01 meters squared     Ht Readings from Last 3 Encounters:   02/24/22 5' 3" (1 6 m)   02/10/22 5' 3" (1 6 m)   01/11/22 5' 3 39" (1 61 m)       Wt Readings from Last 3 Encounters:   02/24/22 99 8 kg (220 lb)   02/10/22 97 5 kg (215 lb)   01/11/22 100 kg (221 lb)        Temp Readings from Last 3 Encounters:   02/24/22 98 °F (36 7 °C)   02/10/22 98 °F (36 7 °C)   01/05/22 98 6 °F (37 °C)        BP Readings from Last 3 Encounters:   02/24/22 140/98   02/10/22 124/70   01/11/22 112/70         Pulse Readings from Last 3 Encounters:   02/24/22 97   02/10/22 94   01/05/22 74         Physical Exam    Physical Exam  Vitals reviewed  Constitutional:       General: She is not in acute distress  Appearance: She is well-developed  She is not diaphoretic  HENT:      Head: Normocephalic and atraumatic  Eyes:      Conjunctiva/sclera: Conjunctivae normal    Neck:      Trachea: No tracheal deviation  Cardiovascular:      Rate and Rhythm: Normal rate and regular rhythm  Heart sounds: No murmur heard  No friction rub  No gallop  Pulmonary:      Effort: Pulmonary effort is normal  No respiratory distress  Breath sounds: Normal breath sounds  No wheezing or rales  Chest:      Chest wall: No tenderness  Abdominal:      General: There is no distension  Palpations: Abdomen is soft  Tenderness: There is no abdominal tenderness  Musculoskeletal:      Cervical back: Normal range of motion and neck supple  Lymphadenopathy:      Cervical: No cervical adenopathy  Skin:     General: Skin is warm and dry  Coloration: Skin is not pale  Findings: No erythema  Neurological:      Mental Status: She is alert and oriented to person, place, and time  Psychiatric:         Behavior: Behavior normal          Thought Content:  Thought content normal          Judgment: Judgment normal              Emergency Contacts:    Extended Emergency Contact Information  Primary Emergency Contact: [address]  Phone: [phone]  Relation: Spouse

## 2022-03-07 ENCOUNTER — TELEPHONE (OUTPATIENT)
Dept: INFUSION CENTER | Facility: HOSPITAL | Age: 47
End: 2022-03-07

## 2022-03-08 ENCOUNTER — HOSPITAL ENCOUNTER (OUTPATIENT)
Dept: INFUSION CENTER | Facility: HOSPITAL | Age: 47
Discharge: HOME | End: 2022-03-08
Attending: INTERNAL MEDICINE
Payer: COMMERCIAL

## 2022-03-08 VITALS
DIASTOLIC BLOOD PRESSURE: 74 MMHG | TEMPERATURE: 98.2 F | HEART RATE: 77 BPM | SYSTOLIC BLOOD PRESSURE: 142 MMHG | WEIGHT: 216.05 LBS | BODY MASS INDEX: 38.27 KG/M2 | RESPIRATION RATE: 18 BRPM

## 2022-03-08 DIAGNOSIS — E61.1 IRON DEFICIENCY: Primary | ICD-10-CM

## 2022-03-08 DIAGNOSIS — Z98.84 BARIATRIC SURGERY STATUS: ICD-10-CM

## 2022-03-08 PROCEDURE — 96365 THER/PROPH/DIAG IV INF INIT: CPT

## 2022-03-08 PROCEDURE — 96366 THER/PROPH/DIAG IV INF ADDON: CPT

## 2022-03-08 RX ORDER — SODIUM CHLORIDE 9 MG/ML
20 INJECTION, SOLUTION INTRAVENOUS ONCE
Status: CANCELLED | OUTPATIENT
Start: 2022-03-10

## 2022-03-08 RX ORDER — SODIUM CHLORIDE 9 MG/ML
20 INJECTION, SOLUTION INTRAVENOUS ONCE
Status: COMPLETED | OUTPATIENT
Start: 2022-03-08 | End: 2022-03-08

## 2022-03-08 RX ADMIN — IRON SUCROSE 300 MG: 20 INJECTION, SOLUTION INTRAVENOUS at 08:47

## 2022-03-08 RX ADMIN — SODIUM CHLORIDE 20 ML/HR: 0.9 INJECTION, SOLUTION INTRAVENOUS at 08:47

## 2022-03-08 NOTE — PROGRESS NOTES
Venofer completed without incident  PT declines AVS, has all future appts    PT reminded to call physician with any issues after infusion, verb  understanding

## 2022-03-10 ENCOUNTER — HOSPITAL ENCOUNTER (OUTPATIENT)
Dept: INFUSION CENTER | Facility: HOSPITAL | Age: 47
Discharge: HOME | End: 2022-03-10
Attending: INTERNAL MEDICINE
Payer: COMMERCIAL

## 2022-03-10 VITALS
SYSTOLIC BLOOD PRESSURE: 120 MMHG | TEMPERATURE: 98 F | HEART RATE: 85 BPM | RESPIRATION RATE: 16 BRPM | DIASTOLIC BLOOD PRESSURE: 65 MMHG

## 2022-03-10 DIAGNOSIS — E61.1 IRON DEFICIENCY: Primary | ICD-10-CM

## 2022-03-10 DIAGNOSIS — Z98.84 BARIATRIC SURGERY STATUS: ICD-10-CM

## 2022-03-10 PROCEDURE — 96365 THER/PROPH/DIAG IV INF INIT: CPT

## 2022-03-10 PROCEDURE — 96366 THER/PROPH/DIAG IV INF ADDON: CPT

## 2022-03-10 RX ORDER — SODIUM CHLORIDE 9 MG/ML
20 INJECTION, SOLUTION INTRAVENOUS ONCE
Status: CANCELLED | OUTPATIENT
Start: 2022-03-15

## 2022-03-10 RX ORDER — SODIUM CHLORIDE 9 MG/ML
20 INJECTION, SOLUTION INTRAVENOUS ONCE
Status: COMPLETED | OUTPATIENT
Start: 2022-03-10 | End: 2022-03-10

## 2022-03-10 RX ADMIN — SODIUM CHLORIDE 20 ML/HR: 0.9 INJECTION, SOLUTION INTRAVENOUS at 13:49

## 2022-03-10 RX ADMIN — IRON SUCROSE 300 MG: 20 INJECTION, SOLUTION INTRAVENOUS at 13:49

## 2022-03-15 ENCOUNTER — HOSPITAL ENCOUNTER (OUTPATIENT)
Dept: INFUSION CENTER | Facility: HOSPITAL | Age: 47
Discharge: HOME | End: 2022-03-15
Attending: INTERNAL MEDICINE
Payer: COMMERCIAL

## 2022-03-15 VITALS
TEMPERATURE: 97.3 F | DIASTOLIC BLOOD PRESSURE: 84 MMHG | RESPIRATION RATE: 18 BRPM | HEART RATE: 84 BPM | SYSTOLIC BLOOD PRESSURE: 132 MMHG

## 2022-03-15 DIAGNOSIS — E61.1 IRON DEFICIENCY: Primary | ICD-10-CM

## 2022-03-15 DIAGNOSIS — Z98.84 BARIATRIC SURGERY STATUS: ICD-10-CM

## 2022-03-15 PROCEDURE — 96366 THER/PROPH/DIAG IV INF ADDON: CPT

## 2022-03-15 PROCEDURE — 96365 THER/PROPH/DIAG IV INF INIT: CPT

## 2022-03-15 RX ORDER — SODIUM CHLORIDE 9 MG/ML
20 INJECTION, SOLUTION INTRAVENOUS ONCE
Status: COMPLETED | OUTPATIENT
Start: 2022-03-15 | End: 2022-03-15

## 2022-03-15 RX ORDER — SODIUM CHLORIDE 9 MG/ML
20 INJECTION, SOLUTION INTRAVENOUS ONCE
Status: CANCELLED | OUTPATIENT
Start: 2022-03-16

## 2022-03-15 RX ADMIN — IRON SUCROSE 300 MG: 20 INJECTION, SOLUTION INTRAVENOUS at 13:55

## 2022-03-15 RX ADMIN — SODIUM CHLORIDE 20 ML/HR: 9 INJECTION, SOLUTION INTRAVENOUS at 13:54

## 2022-03-17 ENCOUNTER — HOSPITAL ENCOUNTER (OUTPATIENT)
Dept: INFUSION CENTER | Facility: HOSPITAL | Age: 47
Discharge: HOME | End: 2022-03-17
Attending: INTERNAL MEDICINE
Payer: COMMERCIAL

## 2022-03-17 VITALS
SYSTOLIC BLOOD PRESSURE: 127 MMHG | TEMPERATURE: 97.9 F | RESPIRATION RATE: 16 BRPM | DIASTOLIC BLOOD PRESSURE: 86 MMHG | HEART RATE: 70 BPM

## 2022-03-17 DIAGNOSIS — Z98.84 BARIATRIC SURGERY STATUS: ICD-10-CM

## 2022-03-17 DIAGNOSIS — E61.1 IRON DEFICIENCY: Primary | ICD-10-CM

## 2022-03-17 PROCEDURE — 96366 THER/PROPH/DIAG IV INF ADDON: CPT

## 2022-03-17 PROCEDURE — 96365 THER/PROPH/DIAG IV INF INIT: CPT

## 2022-03-17 RX ORDER — SODIUM CHLORIDE 9 MG/ML
20 INJECTION, SOLUTION INTRAVENOUS ONCE
Status: COMPLETED | OUTPATIENT
Start: 2022-03-17 | End: 2022-03-17

## 2022-03-17 RX ORDER — SODIUM CHLORIDE 9 MG/ML
20 INJECTION, SOLUTION INTRAVENOUS ONCE
Status: CANCELLED | OUTPATIENT
Start: 2022-03-22

## 2022-03-17 RX ADMIN — IRON SUCROSE 300 MG: 20 INJECTION, SOLUTION INTRAVENOUS at 13:53

## 2022-03-17 RX ADMIN — SODIUM CHLORIDE 20 ML/HR: 9 INJECTION, SOLUTION INTRAVENOUS at 13:53

## 2022-03-22 ENCOUNTER — HOSPITAL ENCOUNTER (OUTPATIENT)
Dept: INFUSION CENTER | Facility: HOSPITAL | Age: 47
Discharge: HOME | End: 2022-03-22
Attending: INTERNAL MEDICINE
Payer: COMMERCIAL

## 2022-03-22 VITALS
SYSTOLIC BLOOD PRESSURE: 105 MMHG | DIASTOLIC BLOOD PRESSURE: 78 MMHG | RESPIRATION RATE: 18 BRPM | HEART RATE: 78 BPM | TEMPERATURE: 97.9 F

## 2022-03-22 DIAGNOSIS — Z98.84 BARIATRIC SURGERY STATUS: ICD-10-CM

## 2022-03-22 DIAGNOSIS — E61.1 IRON DEFICIENCY: Primary | ICD-10-CM

## 2022-03-22 PROCEDURE — 96365 THER/PROPH/DIAG IV INF INIT: CPT

## 2022-03-22 RX ORDER — SODIUM CHLORIDE 9 MG/ML
20 INJECTION, SOLUTION INTRAVENOUS ONCE
Status: COMPLETED | OUTPATIENT
Start: 2022-03-22 | End: 2022-03-22

## 2022-03-22 RX ORDER — SODIUM CHLORIDE 9 MG/ML
20 INJECTION, SOLUTION INTRAVENOUS ONCE
Status: CANCELLED | OUTPATIENT
Start: 2022-03-24

## 2022-03-22 RX ADMIN — IRON SUCROSE 300 MG: 20 INJECTION, SOLUTION INTRAVENOUS at 14:21

## 2022-03-22 RX ADMIN — SODIUM CHLORIDE 20 ML/HR: 9 INJECTION, SOLUTION INTRAVENOUS at 14:20

## 2022-03-22 NOTE — PROGRESS NOTES
Pt tolerated infusion today  Reported some soreness along iv at completion of venofer infusion  Verified iv flushes and aspirates freely  Applied hot pack to site with relief   Pt aware of next appointment declines avs

## 2022-03-24 ENCOUNTER — HOSPITAL ENCOUNTER (OUTPATIENT)
Dept: INFUSION CENTER | Facility: HOSPITAL | Age: 47
Discharge: HOME | End: 2022-03-24
Attending: INTERNAL MEDICINE
Payer: COMMERCIAL

## 2022-03-24 VITALS
TEMPERATURE: 97.2 F | SYSTOLIC BLOOD PRESSURE: 129 MMHG | DIASTOLIC BLOOD PRESSURE: 81 MMHG | RESPIRATION RATE: 18 BRPM | HEART RATE: 80 BPM

## 2022-03-24 DIAGNOSIS — E61.1 IRON DEFICIENCY: Primary | ICD-10-CM

## 2022-03-24 DIAGNOSIS — Z98.84 BARIATRIC SURGERY STATUS: ICD-10-CM

## 2022-03-24 PROCEDURE — 96366 THER/PROPH/DIAG IV INF ADDON: CPT

## 2022-03-24 PROCEDURE — 96365 THER/PROPH/DIAG IV INF INIT: CPT

## 2022-03-24 RX ORDER — SODIUM CHLORIDE 9 MG/ML
20 INJECTION, SOLUTION INTRAVENOUS ONCE
Status: COMPLETED | OUTPATIENT
Start: 2022-03-24 | End: 2022-03-24

## 2022-03-24 RX ORDER — SODIUM CHLORIDE 9 MG/ML
20 INJECTION, SOLUTION INTRAVENOUS ONCE
Status: CANCELLED | OUTPATIENT
Start: 2022-03-29

## 2022-03-24 RX ADMIN — SODIUM CHLORIDE 20 ML/HR: 0.9 INJECTION, SOLUTION INTRAVENOUS at 14:15

## 2022-03-24 RX ADMIN — IRON SUCROSE 300 MG: 20 INJECTION, SOLUTION INTRAVENOUS at 14:31

## 2022-03-29 ENCOUNTER — HOSPITAL ENCOUNTER (OUTPATIENT)
Dept: INFUSION CENTER | Facility: HOSPITAL | Age: 47
Discharge: HOME | End: 2022-03-29
Attending: INTERNAL MEDICINE
Payer: COMMERCIAL

## 2022-03-29 VITALS
TEMPERATURE: 97.6 F | DIASTOLIC BLOOD PRESSURE: 82 MMHG | SYSTOLIC BLOOD PRESSURE: 130 MMHG | RESPIRATION RATE: 16 BRPM | HEART RATE: 74 BPM

## 2022-03-29 DIAGNOSIS — E61.1 IRON DEFICIENCY: Primary | ICD-10-CM

## 2022-03-29 DIAGNOSIS — Z98.84 BARIATRIC SURGERY STATUS: ICD-10-CM

## 2022-03-29 PROCEDURE — 96366 THER/PROPH/DIAG IV INF ADDON: CPT

## 2022-03-29 PROCEDURE — 96365 THER/PROPH/DIAG IV INF INIT: CPT

## 2022-03-29 RX ORDER — SODIUM CHLORIDE 9 MG/ML
20 INJECTION, SOLUTION INTRAVENOUS ONCE
Status: COMPLETED | OUTPATIENT
Start: 2022-03-29 | End: 2022-03-29

## 2022-03-29 RX ORDER — SODIUM CHLORIDE 9 MG/ML
20 INJECTION, SOLUTION INTRAVENOUS ONCE
Status: CANCELLED | OUTPATIENT
Start: 2022-04-01

## 2022-03-29 RX ADMIN — SODIUM CHLORIDE 20 ML/HR: 0.9 INJECTION, SOLUTION INTRAVENOUS at 13:28

## 2022-03-29 RX ADMIN — IRON SUCROSE 300 MG: 20 INJECTION, SOLUTION INTRAVENOUS at 13:28

## 2022-04-01 ENCOUNTER — HOSPITAL ENCOUNTER (OUTPATIENT)
Dept: INFUSION CENTER | Facility: HOSPITAL | Age: 47
Discharge: HOME | End: 2022-04-01
Attending: INTERNAL MEDICINE
Payer: COMMERCIAL

## 2022-04-01 VITALS
HEART RATE: 74 BPM | SYSTOLIC BLOOD PRESSURE: 136 MMHG | DIASTOLIC BLOOD PRESSURE: 66 MMHG | RESPIRATION RATE: 20 BRPM | TEMPERATURE: 96.6 F

## 2022-04-01 DIAGNOSIS — Z98.84 BARIATRIC SURGERY STATUS: ICD-10-CM

## 2022-04-01 DIAGNOSIS — E61.1 IRON DEFICIENCY: Primary | ICD-10-CM

## 2022-04-01 PROCEDURE — 96365 THER/PROPH/DIAG IV INF INIT: CPT

## 2022-04-01 PROCEDURE — 96366 THER/PROPH/DIAG IV INF ADDON: CPT

## 2022-04-01 RX ORDER — SODIUM CHLORIDE 9 MG/ML
20 INJECTION, SOLUTION INTRAVENOUS ONCE
Status: COMPLETED | OUTPATIENT
Start: 2022-04-01 | End: 2022-04-01

## 2022-04-01 RX ORDER — SODIUM CHLORIDE 9 MG/ML
20 INJECTION, SOLUTION INTRAVENOUS ONCE
Status: CANCELLED | OUTPATIENT
Start: 2022-04-01

## 2022-04-01 RX ADMIN — IRON SUCROSE 300 MG: 20 INJECTION, SOLUTION INTRAVENOUS at 09:56

## 2022-04-01 RX ADMIN — SODIUM CHLORIDE 20 ML/HR: 9 INJECTION, SOLUTION INTRAVENOUS at 09:56

## 2022-04-01 NOTE — PROGRESS NOTES
Venofer completed without incident  Pt declines AVS   No further appts at this time  PT has f/u scheduled with hematology in [place]

## 2022-04-22 ENCOUNTER — OFFICE VISIT (OUTPATIENT)
Dept: INTERNAL MEDICINE CLINIC | Age: 47
End: 2022-04-22
Payer: COMMERCIAL

## 2022-04-22 VITALS
WEIGHT: 216 LBS | BODY MASS INDEX: 39.75 KG/M2 | HEART RATE: 82 BPM | SYSTOLIC BLOOD PRESSURE: 122 MMHG | HEIGHT: 62 IN | OXYGEN SATURATION: 99 % | DIASTOLIC BLOOD PRESSURE: 84 MMHG | TEMPERATURE: 97.9 F

## 2022-04-22 DIAGNOSIS — K21.9 GASTROESOPHAGEAL REFLUX DISEASE WITHOUT ESOPHAGITIS: ICD-10-CM

## 2022-04-22 DIAGNOSIS — M77.01 MEDIAL EPICONDYLITIS OF ELBOW, RIGHT: Primary | ICD-10-CM

## 2022-04-22 DIAGNOSIS — E55.9 VITAMIN D DEFICIENCY: ICD-10-CM

## 2022-04-22 PROCEDURE — 99214 OFFICE O/P EST MOD 30 MIN: CPT | Performed by: INTERNAL MEDICINE

## 2022-04-22 PROCEDURE — 3008F BODY MASS INDEX DOCD: CPT | Performed by: INTERNAL MEDICINE

## 2022-04-22 PROCEDURE — 1036F TOBACCO NON-USER: CPT | Performed by: INTERNAL MEDICINE

## 2022-04-22 NOTE — PROGRESS NOTES
Assessment/Plan:    Medial epicondylitis of right elbow  -  Patient was counseled to avoid making the repetitive rotational movements of her elbow and wrist but states that this is difficult because of her job  I offered to write her a work note but she declined at this time  I did explain to her that this overuse injury will continue if she continues to perform does rotational tasks  -will start patient on Voltaren gel to be applied 4 times a day as needed   - she was counseled to continue with Tylenol and to take it regularly   -will avoid NSAIDs because of her history of bariatric surgery and GERD   - will refer patient to physical therapy   - will prescribe and elbow brace to be worn especially at nighttime    GERD   -  Stable   -will avoid use of NSAIDs because of patient's GERD and history of bariatric surgery   Will use Voltaren gel to avoid GI effect   -continue to avoid diets and behaviors that trigger symptoms    Vitamin-D deficiency  - stable   - continue with ergocalciferol for better joint health     Diagnoses and all orders for this visit:    Medial epicondylitis of elbow, right  -     Elbow Rom Brace  -     Diclofenac Sodium (VOLTAREN) 1 %; Apply 2 g topically 4 (four) times a day  -     Ambulatory Referral to Physical Therapy; Future    Gastroesophageal reflux disease without esophagitis    Vitamin D deficiency             Subjective:      Patient ID: Brad Calloway is a 42 y o  female  HPI   Patient presents with complaints that she has been having R elbow pain for 3 months  No hx of trauma, fall or MVA prior to onset of pain  Pain is a 2-3/10 and at the worst 6/10   Pain is worse on straightening her elbow after being in one position for a while and wakes her up sometimes  Pain sometimes radiates down the proximal forearm and she has associated + decreased  Right hand  strength with pain in the elbow      Of note, she is left hand dominant  No swelling in the right elbow  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 and states that opening the doors involves a rotational movement of the wrist that is somewhat hard  Has been trying not to use that right hand to open cell doors for a couple of weeks , and a max of 4 weeks  She states hat the pain still has not resolved  Played soft ball in school  She denies any numbness of the fingers but states that her right index finger sometimes feels sore at the knuckle  She denies fever, chills, night sweats, headache, dizziness, chest pain, shortness of breath, palpitations, nausea, vomiting abdominal pain, diarrhea, constipation  Of note, patient has a history of GERD a vitamin-D deficiency  She states that she tries to use Tylenol occasionally but does not like to take medications and so does not take it regularly  She also cannot take NSAIDs because she has a history of bariatric surgery  The following portions of the patient's history were reviewed and updated as appropriate:   She  has a past medical history of Anemia, Bariatric surgery status, CPAP (continuous positive airway pressure) dependence, Depression, History of anxiety, Left knee pain, Low back pain, Morbid obesity ([address] ), Motion sickness, Obesity, PONV (postoperative nausea and vomiting), Postgastrectomy malabsorption, Prediabetes, Right ankle pain, Shortness of breath, Sleep apnea, Walks frequently, and Wears glasses    She   Patient Active Problem List    Diagnosis Date Noted    Annual physical exam 03/11/2021    Need for influenza vaccination 03/11/2021    Influenza vaccination declined by patient 03/11/2021    Right shoulder pain 01/14/2021    Neck pain on right side 01/14/2021    Infected sebaceous cyst 05/26/2020    Vitamin D deficiency 05/29/2019    Gastroesophageal reflux disease without esophagitis 09/12/2018    Obesity, Class I, BMI 30-34 9 08/30/2018    Postsurgical malabsorption 08/29/2018    Iron deficiency 08/29/2018    Bariatric surgery status 06/20/2018    Anxiety disorder 10/22/2014    Depression 10/22/2014    Hypercholesterolemia 10/30/2013     She  has a past surgical history that includes Anterior cruciate ligament repair (Left); Ankle surgery (Bilateral); Knee cartilage surgery; pr egd transoral biopsy single/multiple (N/A, 3/21/2018); [place] tooth extraction; pr lap, diaz restrict proc, longitudinal gastrectomy (N/A, 5/8/2018); Bariatric Surgery (05/08/2018); Knee surgery (Left); and Reduction mammaplasty (Bilateral)  Her family history includes Diabetes type II in her father and mother; Heart attack in her mother; Hypertension in her father and mother; No Known Problems in her maternal grandfather, maternal grandmother, paternal aunt, paternal grandfather, paternal grandmother, sister, and sister  She  reports that she has never smoked  She has never used smokeless tobacco  She reports current alcohol use  She reports that she does not use drugs  Current Outpatient Medications   Medication Sig Dispense Refill    buPROPion (WELLBUTRIN XL) 150 mg 24 hr tablet TAKE 1 TABLET BY MOUTH TWICE A  tablet 1    CALCIUM PO Take 1 each by mouth 3 (three) times a day        ergocalciferol (VITAMIN D2) 50,000 units Take 1 capsule (50,000 Units total) by mouth 2 (two) times a week (Patient taking differently: Take 50,000 Units by mouth once a week  ) 24 capsule 0    Multiple Vitamin (MULTIVITAMIN) tablet Take 1 tablet by mouth daily      Diclofenac Sodium (VOLTAREN) 1 % Apply 2 g topically 4 (four) times a day 50 g 1     No current facility-administered medications for this visit       Current Outpatient Medications on File Prior to Visit   Medication Sig    buPROPion (WELLBUTRIN XL) 150 mg 24 hr tablet TAKE 1 TABLET BY MOUTH TWICE A DAY    CALCIUM PO Take 1 each by mouth 3 (three) times a day      ergocalciferol (VITAMIN D2) 50,000 units Take 1 capsule (50,000 Units total) by mouth 2 (two) times a week (Patient taking differently: Take 50,000 Units by mouth once a week  )    Multiple Vitamin (MULTIVITAMIN) tablet Take 1 tablet by mouth daily     No current facility-administered medications on file prior to visit  She has No Known Allergies       Review of Systems   Constitutional: Negative for activity change, chills, fatigue, fever and unexpected weight change  HENT: Negative for ear pain, postnasal drip, rhinorrhea, sinus pressure and sore throat  Eyes: Negative for pain  Respiratory: Negative for cough, choking, chest tightness, shortness of breath and wheezing  Cardiovascular: Negative for chest pain, palpitations and leg swelling  Gastrointestinal: Negative for abdominal pain, constipation, diarrhea, nausea and vomiting  Genitourinary: Negative for dysuria and hematuria  Musculoskeletal: Positive for arthralgias (R elbow pain)  Negative for back pain, gait problem, joint swelling, myalgias and neck stiffness  Skin: Negative for pallor and rash  Neurological: Negative for dizziness, tremors, seizures, syncope, light-headedness and headaches  Hematological: Negative for adenopathy  Psychiatric/Behavioral: Negative for behavioral problems  Objective:      /84 (BP Location: Left arm, Patient Position: Sitting, Cuff Size: Standard)   Pulse 82   Temp 97 9 °F (36 6 °C) (Temporal)   Ht 5' 2" (1 575 m) Comment: per pt  Wt 98 kg (216 lb)   SpO2 99%   BMI 39 51 kg/m²          Physical Exam  Constitutional:       General: She is not in acute distress  Appearance: She is well-developed  She is not diaphoretic  HENT:      Head: Normocephalic and atraumatic  Right Ear: External ear normal       Left Ear: External ear normal       Nose: Nose normal       Mouth/Throat:      Pharynx: Posterior oropharyngeal erythema ( oropharyngeal erythema with dry mucous membrane) present  No oropharyngeal exudate  Eyes:      General: No scleral icterus  Right eye: No discharge  Left eye: No discharge  Conjunctiva/sclera: Conjunctivae normal       Pupils: Pupils are equal, round, and reactive to light  Neck:      Thyroid: No thyromegaly  Vascular: No JVD  Trachea: No tracheal deviation  Cardiovascular:      Rate and Rhythm: Normal rate and regular rhythm  Heart sounds: Normal heart sounds  No murmur heard  No friction rub  No gallop  Pulmonary:      Effort: Pulmonary effort is normal  No respiratory distress  Breath sounds: Normal breath sounds  No wheezing or rales  Chest:      Chest wall: No tenderness  Abdominal:      General: Bowel sounds are normal  There is no distension  Palpations: Abdomen is soft  There is no mass  Tenderness: There is abdominal tenderness ( epigastric abdominal tenderness) in the epigastric area  There is no guarding or rebound  Musculoskeletal:         General: No deformity  Normal range of motion  Right elbow: Tenderness (Tenderness in the the lateral region with tenderness in the medial epicondyle on resisted flexion of the wrist, resisted supination and pronation) present in medial epicondyle  Left elbow: No tenderness  Cervical back: Normal range of motion and neck supple  Lymphadenopathy:      Cervical: No cervical adenopathy  Skin:     General: Skin is warm and dry  Coloration: Skin is not pale  Findings: No erythema or rash  Neurological:      Mental Status: She is alert and oriented to person, place, and time  Cranial Nerves: No cranial nerve deficit  Motor: No abnormal muscle tone  Coordination: Coordination normal       Deep Tendon Reflexes: Reflexes are normal and symmetric     Psychiatric:         Behavior: Behavior normal            Appointment on 02/11/2022   Component Date Value Ref Range Status    WBC 02/11/2022 5 92  4 31 - 10 16 Thousand/uL Final    RBC 02/11/2022 4 38  3 81 - 5 12 Million/uL Final    Hemoglobin 02/11/2022 7 9* 11 5 - 15 4 g/dL Final  Hematocrit 02/11/2022 29 6* 34 8 - 46 1 % Final    MCV 02/11/2022 68* 82 - 98 fL Final    MCH 02/11/2022 18 0* 26 8 - 34 3 pg Final    MCHC 02/11/2022 26 7* 31 4 - 37 4 g/dL Final    RDW 02/11/2022 16 7* 11 6 - 15 1 % Final    MPV 02/11/2022 9 5  8 9 - 12 7 fL Final    Platelets 30/78/1248 481* 149 - 390 Thousands/uL Final    nRBC 02/11/2022 0  /100 WBCs Final    Neutrophils Relative 02/11/2022 50  43 - 75 % Final    Immat GRANS % 02/11/2022 0  0 - 2 % Final    Lymphocytes Relative 02/11/2022 39  14 - 44 % Final    Monocytes Relative 02/11/2022 6  4 - 12 % Final    Eosinophils Relative 02/11/2022 4  0 - 6 % Final    Basophils Relative 02/11/2022 1  0 - 1 % Final    Neutrophils Absolute 02/11/2022 2 98  1 85 - 7 62 Thousands/µL Final    Immature Grans Absolute 02/11/2022 0 02  0 00 - 0 20 Thousand/uL Final    Lymphocytes Absolute 02/11/2022 2 28  0 60 - 4 47 Thousands/µL Final    Monocytes Absolute 02/11/2022 0 35  0 17 - 1 22 Thousand/µL Final    Eosinophils Absolute 02/11/2022 0 23  0 00 - 0 61 Thousand/µL Final    Basophils Absolute 02/11/2022 0 06  0 00 - 0 10 Thousands/µL Final    TSH 3RD GENERATON 02/11/2022 2 550  0 358 - 3 740 uIU/mL Final    The recommended reference ranges for TSH during pregnancy are as follows:   First trimester 0 1 to 2 5 uIU/mL   Second trimester  0 2 to 3 0 uIU/mL   Third trimester 0 3 to 3 0 uIU/m    Note: Normal ranges may not apply to patients who are transgender, non-binary, or whose legal sex, sex at birth, and gender identity differ      Sodium 02/11/2022 138  136 - 145 mmol/L Final    Potassium 02/11/2022 4 0  3 5 - 5 3 mmol/L Final    Chloride 02/11/2022 108  100 - 108 mmol/L Final    CO2 02/11/2022 22  21 - 32 mmol/L Final    ANION GAP 02/11/2022 8  4 - 13 mmol/L Final    BUN 02/11/2022 13  5 - 25 mg/dL Final    Creatinine 02/11/2022 0 87  0 60 - 1 30 mg/dL Final    Standardized to IDMS reference method    Glucose, Fasting 02/11/2022 85  65 - 99 mg/dL Final    Specimen collection should occur prior to Sulfasalazine administration due to the potential for falsely depressed results  Specimen collection should occur prior to Sulfapyridine administration due to the potential for falsely elevated results   Calcium 02/11/2022 9 1  8 3 - 10 1 mg/dL Final    AST 02/11/2022 12  5 - 45 U/L Final    Specimen collection should occur prior to Sulfasalazine administration due to the potential for falsely depressed results   ALT 02/11/2022 22  12 - 78 U/L Final    Specimen collection should occur prior to Sulfasalazine and/or Sulfapyridine administration due to the potential for falsely depressed results   Alkaline Phosphatase 02/11/2022 69  46 - 116 U/L Final    Total Protein 02/11/2022 7 5  6 4 - 8 2 g/dL Final    Albumin 02/11/2022 3 8  3 5 - 5 0 g/dL Final    Total Bilirubin 02/11/2022 0 48  0 20 - 1 00 mg/dL Final    Use of this assay is not recommended for patients undergoing treatment with eltrombopag due to the potential for falsely elevated results   eGFR 02/11/2022 81  ml/min/1 73sq m Final    Cholesterol 02/11/2022 214* See Comment mg/dL Final    Cholesterol:         Pediatric <18 Years        Desirable          <170 mg/dL      Borderline High    170-199 mg/dL      High               >=200 mg/dL        Adult >=18 Years            Desirable         <200 mg/dL      Borderline High   200-239 mg/dL      High              >239 mg/dL      Triglycerides 02/11/2022 82  See Comment mg/dL Final    Triglyceride:     0-9Y            <75mg/dL     10Y-17Y         <90 mg/dL       >=18Y     Normal          <150 mg/dL     Borderline High 150-199 mg/dL     High            200-499 mg/dL        Very High       >499 mg/dL    Specimen collection should occur prior to N-Acetylcysteine or Metamizole administration due to the potential for falsely depressed results      HDL, Direct 02/11/2022 55  >=50 mg/dL Final    Specimen collection should occur prior to Metamizole administration due to the potential for falsley depressed results   LDL Calculated 02/11/2022 143* 0 - 100 mg/dL Final    LDL Cholesterol:     Optimal           <100 mg/dl     Near Optimal      100-129 mg/dl     Above Optimal       Borderline High 130-159 mg/dl       High            160-189 mg/dl       Very High       >189 mg/dl         This screening LDL is a calculated result  It does not have the accuracy of the Direct Measured LDL in the monitoring of patients with hyperlipidemia and/or statin therapy  Direct Measure LDL (NDW095) must be ordered separately in these patients   Non-HDL-Chol (CHOL-HDL) 02/11/2022 159  mg/dl Final    Ferritin 02/11/2022 3* 8 - 388 ng/mL Final    Folate 02/11/2022 11 3  3 1 - 17 5 ng/mL Final    Iron Saturation 02/11/2022 2* 15 - 50 % Final    TIBC 02/11/2022 558* 250 - 450 ug/dL Final    Iron 02/11/2022 12* 50 - 170 ug/dL Final    Patients treated with metal-binding drugs (ie  Deferoxamine) may have depressed iron values   PTH 02/11/2022 138 9* 18 4 - 80 1 pg/mL Final    Vitamin A 02/11/2022 22 2  20 1 - 62 0 ug/dL Final    Reference intervals for vitamin A determined from [facility] internal  studies  Individuals with vitamin A less than 20 ug/dL are considered  vitamin A deficient and those with serum concentrations less than  10 ug/dL are considered severely deficient  This test was developed and its performance characteristics  determined by [facility]  It has not been cleared or approved  by the Food and Drug Administration      Vitamin B1, Whole Blood 02/11/2022 87 6  66 5 - 200 0 nmol/L Final    Vitamin B-12 02/11/2022 385  100 - 900 pg/mL Final    Vit D, 25-Hydroxy 02/11/2022 18 4* 30 0 - 100 0 ng/mL Final    Zinc 02/11/2022 77  44 - 115 ug/dL Final                                    Detection Limit = 5   Office Visit on 01/11/2022   Component Date Value Ref Range Status    Case Report 01/11/2022    Final                    Value:Gynecologic Cytology Report                       Case: [id number]                                  Authorizing Provider:  Desiree Beckman PA-C Collected:           01/11/2022 [id number]              Ordering Location:     [facility] [place]  Received:            01/11/2022 1847              First Screen:          Therisa Dubin, CT                                                         Rescreen:              Elyce Prader, CT                                                       Specimen:    LIQUID-BASED PAP, SCREENING, Cervix, Endocervical                                          Primary Interpretation 01/11/2022 Negative for intraepithelial lesion or malignancy   Final    Specimen Adequacy 01/11/2022 Satisfactory for evaluation  Endocervical/transformation zone component present  Final    Additional Information 01/11/2022    Final                    Value: This result contains rich text formatting which cannot be displayed here   HPV Other HR 01/11/2022 Negative  Negative Final    HPV types: 70,10,45,83,48,44,23,43,85,31,27 and 68 DNA are undetectable or below the pre-set threshold      HPV16 01/11/2022 Negative  Negative Final    HPV18 01/11/2022 Negative  Negative Final

## 2022-06-02 ENCOUNTER — TELEPHONE (OUTPATIENT)
Dept: HEMATOLOGY ONCOLOGY | Facility: CLINIC | Age: 47
End: 2022-06-02

## 2022-06-02 ENCOUNTER — HOSPITAL ENCOUNTER (OUTPATIENT)
Dept: RADIOLOGY | Facility: HOSPITAL | Age: 47
Discharge: HOME | End: 2022-06-02
Payer: COMMERCIAL

## 2022-06-02 DIAGNOSIS — Z48.815 ENCOUNTER FOR SURGICAL AFTERCARE FOLLOWING SURGERY OF DIGESTIVE SYSTEM: ICD-10-CM

## 2022-06-02 DIAGNOSIS — E66.9 OBESITY, CLASS II, BMI 35-39.9: ICD-10-CM

## 2022-06-02 DIAGNOSIS — K91.2 POSTSURGICAL MALABSORPTION: ICD-10-CM

## 2022-06-02 DIAGNOSIS — K21.9 GASTROESOPHAGEAL REFLUX DISEASE WITHOUT ESOPHAGITIS: ICD-10-CM

## 2022-06-02 DIAGNOSIS — Z98.84 BARIATRIC SURGERY STATUS: ICD-10-CM

## 2022-06-02 PROCEDURE — 74240 X-RAY XM UPR GI TRC 1CNTRST: CPT

## 2022-06-02 NOTE — TELEPHONE ENCOUNTER
I LEFT A VM FOR PATIENT TO LET HER KNOW WE MOVED HER APPOINTMENT BACK   LEFT NUMBER FOR THE HOPE LINE IN CASE IT DIDN'T WORK

## 2022-06-07 ENCOUNTER — TELEPHONE (OUTPATIENT)
Dept: BARIATRICS | Facility: CLINIC | Age: 47
End: 2022-06-07

## 2022-06-07 NOTE — TELEPHONE ENCOUNTER
Left voice message for patient to call back to schedule for an EGD with [facility] with Dr Jen Stein and a review appointment to follow-up

## 2022-06-09 ENCOUNTER — APPOINTMENT (OUTPATIENT)
Dept: LAB | Age: 47
End: 2022-06-09
Payer: COMMERCIAL

## 2022-06-09 DIAGNOSIS — E53.8 B12 DEFICIENCY: ICD-10-CM

## 2022-06-09 DIAGNOSIS — E61.1 IRON DEFICIENCY: ICD-10-CM

## 2022-06-09 DIAGNOSIS — E55.9 VITAMIN D DEFICIENCY: ICD-10-CM

## 2022-06-09 DIAGNOSIS — Z98.84 BARIATRIC SURGERY STATUS: ICD-10-CM

## 2022-06-09 DIAGNOSIS — D50.9 IRON DEFICIENCY ANEMIA: ICD-10-CM

## 2022-06-09 LAB
25(OH)D3 SERPL-MCNC: 50.4 NG/ML (ref 30–100)
BASOPHILS # BLD AUTO: 0.05 THOUSANDS/ΜL (ref 0–0.1)
BASOPHILS NFR BLD AUTO: 1 % (ref 0–1)
EOSINOPHIL # BLD AUTO: 0.29 THOUSAND/ΜL (ref 0–0.61)
EOSINOPHIL NFR BLD AUTO: 4 % (ref 0–6)
ERYTHROCYTE [DISTWIDTH] IN BLOOD BY AUTOMATED COUNT: 18.4 % (ref 11.6–15.1)
FERRITIN SERPL-MCNC: 275 NG/ML (ref 8–388)
HCT VFR BLD AUTO: 42.9 % (ref 34.8–46.1)
HGB BLD-MCNC: 14.3 G/DL (ref 11.5–15.4)
IMM GRANULOCYTES # BLD AUTO: 0.04 THOUSAND/UL (ref 0–0.2)
IMM GRANULOCYTES NFR BLD AUTO: 1 % (ref 0–2)
IRON SATN MFR SERPL: 30 % (ref 15–50)
IRON SERPL-MCNC: 96 UG/DL (ref 50–170)
LYMPHOCYTES # BLD AUTO: 2.63 THOUSANDS/ΜL (ref 0.6–4.47)
LYMPHOCYTES NFR BLD AUTO: 32 % (ref 14–44)
MCH RBC QN AUTO: 28 PG (ref 26.8–34.3)
MCHC RBC AUTO-ENTMCNC: 33.3 G/DL (ref 31.4–37.4)
MCV RBC AUTO: 84 FL (ref 82–98)
MONOCYTES # BLD AUTO: 0.43 THOUSAND/ΜL (ref 0.17–1.22)
MONOCYTES NFR BLD AUTO: 5 % (ref 4–12)
NEUTROPHILS # BLD AUTO: 4.7 THOUSANDS/ΜL (ref 1.85–7.62)
NEUTS SEG NFR BLD AUTO: 57 % (ref 43–75)
NRBC BLD AUTO-RTO: 0 /100 WBCS
PLATELET # BLD AUTO: 319 THOUSANDS/UL (ref 149–390)
PMV BLD AUTO: 10 FL (ref 8.9–12.7)
RBC # BLD AUTO: 5.11 MILLION/UL (ref 3.81–5.12)
TIBC SERPL-MCNC: 316 UG/DL (ref 250–450)
VIT B12 SERPL-MCNC: 1292 PG/ML (ref 100–900)
WBC # BLD AUTO: 8.14 THOUSAND/UL (ref 4.31–10.16)

## 2022-06-09 PROCEDURE — 82728 ASSAY OF FERRITIN: CPT

## 2022-06-09 PROCEDURE — 82306 VITAMIN D 25 HYDROXY: CPT

## 2022-06-09 PROCEDURE — 83540 ASSAY OF IRON: CPT

## 2022-06-09 PROCEDURE — 83550 IRON BINDING TEST: CPT

## 2022-06-09 PROCEDURE — 36415 COLL VENOUS BLD VENIPUNCTURE: CPT

## 2022-06-09 PROCEDURE — 82607 VITAMIN B-12: CPT

## 2022-06-09 PROCEDURE — 85025 COMPLETE CBC W/AUTO DIFF WBC: CPT

## 2022-06-16 ENCOUNTER — OFFICE VISIT (OUTPATIENT)
Dept: HEMATOLOGY ONCOLOGY | Facility: CLINIC | Age: 47
End: 2022-06-16
Payer: COMMERCIAL

## 2022-06-16 VITALS
TEMPERATURE: 98.3 F | DIASTOLIC BLOOD PRESSURE: 82 MMHG | WEIGHT: 221 LBS | HEART RATE: 72 BPM | RESPIRATION RATE: 16 BRPM | OXYGEN SATURATION: 97 % | HEIGHT: 62 IN | SYSTOLIC BLOOD PRESSURE: 124 MMHG | BODY MASS INDEX: 40.67 KG/M2

## 2022-06-16 DIAGNOSIS — E61.1 IRON DEFICIENCY: ICD-10-CM

## 2022-06-16 DIAGNOSIS — Z98.84 BARIATRIC SURGERY STATUS: Primary | Chronic | ICD-10-CM

## 2022-06-16 DIAGNOSIS — I87.8 POOR VENOUS ACCESS: ICD-10-CM

## 2022-06-16 DIAGNOSIS — K91.2 POSTSURGICAL MALABSORPTION: Chronic | ICD-10-CM

## 2022-06-16 PROCEDURE — 99213 OFFICE O/P EST LOW 20 MIN: CPT | Performed by: PHYSICIAN ASSISTANT

## 2022-06-16 PROCEDURE — 1036F TOBACCO NON-USER: CPT | Performed by: PHYSICIAN ASSISTANT

## 2022-06-16 PROCEDURE — 3008F BODY MASS INDEX DOCD: CPT | Performed by: PHYSICIAN ASSISTANT

## 2022-06-16 NOTE — PROGRESS NOTES
Hematology/Oncology Outpatient Follow- up Note  Tre Cagle 42 y o  female MRN: @ Encounter: [id number]        Date:  6/16/2022      Assessment / Plan:    1  Iron deficiency anemia 2nd to menometrorrhagia and exacerbated by gastric sleeve surgery which was performed May 2018 by Dr Westfall President  2/11/2022 hemoglobin 7 9, MCV of 68   platelets 102  Iron saturation 2%, ferritin 3  Vitamin B12 385  CMP normal   She had been taking oral iron which does cause some constipation and nausea       Venofer 300 milligrams x 8 doses administered 2/2022 6/9/22-Hemoglobin improved to 14 3 with MCV 84  Iron saturation 30%, ferritin 275      Sublingual B12 daily initiated 2/22  B12 1292  She can cut back to 3/week  She also is taking vitamin D 73865 units twice weekly  Vitamin-D improved to 50  She is now taking this weekly  We discussed maintenance Venofer versus active surveillance  She is in favor of active surveillance given her poor venous access  She is asked to have repeat labs in approximately 4 months, sooner if she were to have any return of her anemia symptoms such as lightheadedness, fatigue, PICA             HPI:  Tre Cagle is a 42 y o  seen for initial consultation 2/24/2022 at the referral of [address] accompanied by her  Portillo Mora regarding iron deficiency      She has past medical history hypercholesterolemia, depression, anxiety, right shoulder pain   She status post vertical sleeve gastrectomy by Dr Westfall President 5/8/2018 5/9/2018 hemoglobin 9 7, MCV 77, white blood cell count 10 9, platelet count 946, iron saturation 8%, ferritin 37     10/13/2018 hemoglobin 11 9, MCV of 81, white blood cell count 6 82, platelet count 968, iron saturation 13%, ferritin 9     5/15/2019 hemoglobin 11 9, MCV of 80, white blood cell count 7 45, platelets 412, iron saturation 9%, ferritin 4     8/22/2019 iron saturation 25%, ferritin 9     10/22/2019 hemoglobin 12 5, MCV 83, white blood cell count 7 55, platelet count 463     2/11/2022 hemoglobin 7 9, MCV of 68, white blood cell count 5 92, 50% neutrophils, 39% lymphocytes, 6% monocytes, 4% eosinophils, platelets 543  Iron saturation 2%, ferritin 3  Vitamin B12 385  CMP normal      Last week she started sublingual vitamin B12  She also started vitamin D 03901 units twice weekly      Patient reports she is very fatigued, she does have ice chip Pica  Denies any hair loss, muscle cramping  She does have chronic heartburn  Denies any dysphagia or sensation of food getting stuck  Denies any GI or  symptoms  Denies any melena, hematochezia, hematuria  She does not utilize NSAIDs with any regularity  She has been taking oral iron which causes some nausea and constipation      She is a nonsmoker      Patient reports she has had iron deficiency anemia my whole life  menses are somewhat irregular  She can have month where she does not have periods and then heavy menses back in last up to a month  Interval History:    Venofer 300 milligrams x 8 doses administered 2/2022 6/9/22-Hemoglobin improved to 14 3 with MCV 84  Iron saturation 30%, ferritin 275    Ice chip PICA has resolved  Her energy is improved  She is less lightheaded      Test Results:        Labs:   Lab Results   Component Value Date    HGB 14 3 06/09/2022    HCT 42 9 06/09/2022    MCV 84 06/09/2022     06/09/2022    WBC 8 14 06/09/2022    NRBC 0 06/09/2022     Lab Results   Component Value Date    K 4 0 02/11/2022     02/11/2022    CO2 22 02/11/2022    BUN 13 02/11/2022    CREATININE 0 87 02/11/2022    GLUF 85 02/11/2022    CALCIUM 9 1 02/11/2022    AST 12 02/11/2022    ALT 22 02/11/2022    ALKPHOS 69 02/11/2022    EGFR 81 02/11/2022       Imaging: FL UPPER GI UGI    Result Date: 6/2/2022  Narrative: UPPER GI SERIES  SINGLE CONTRAST INDICATION:  Z48 815: Encounter for surgical aftercare following surgery on the digestive system Z98 84: Bariatric surgery status K91 2: Postsurgical malabsorption, not elsewhere classified E66 9: Obesity, unspecified K21 9: Gastro-esophageal reflux disease without esophagitis  COMPARISON:  None IMAGES:  108 FLUOROSCOPY TIME:   1 8min TECHNIQUE:  The patient was given barium by mouth and images of the esophagus, stomach, and small bowel were obtained  Patient was not administered effervescent granules due to history of sleeve gastrectomy  FINDINGS: The esophagus is normal in caliber  Esophageal motility is normal and emptying of contrast from the esophagus is prompt  There is no evidence of discrete mucosal mass, ulceration or fold thickening identified  There is mild narrowing of the gastric lumen in keeping with history of sleeve gastrectomy  The visualized gastric mucosa is unremarkable  No discrete penetrating ulcers or masses are seen  Contrast empties promptly into the duodenum  The duodenum is normal in caliber  The ligament of Treitz/duodenojejunal junction lies in a normal position  A small amount of gastroesophageal reflux was observed during the exam  There is a small reducible hiatal hernia  There is a small luminal outpouching seen along the left aspect of the hiatal hernia which is favored to be postsurgical in etiology  Impression: 1  Postsurgical changes from prior sleeve gastrectomy with small hiatal hernia  There is a small luminal outpouching along the left aspect of the hernia which is favored to be postsurgical in etiology  2   Mild gastric esophageal reflux was noted during the exam  Workstation performed: [id number]           ROS:  As mentioned in HPI & Interval History otherwise 14 point ROS negative          Allergies: No Known Allergies  Current Medications: Reviewed  PMH/FH/SH:  Reviewed      Physical Exam:    1 99 meters squared    Ht Readings from Last 3 Encounters:   06/16/22 5' 2" (1 575 m)   04/22/22 5' 2" (1 575 m)   02/24/22 5' 3" (1 6 m)        Wt Readings from Last 3 Encounters: 06/16/22 100 kg (221 lb)   04/22/22 98 kg (216 lb)   03/08/22 98 kg (216 lb 0 8 oz)        Temp Readings from Last 3 Encounters:   04/22/22 97 9 °F (36 6 °C) (Temporal)   04/01/22 (!) 96 6 °F (35 9 °C) (Temporal)   03/29/22 97 6 °F (36 4 °C)        BP Readings from Last 3 Encounters:   04/22/22 122/84   04/01/22 136/66   03/29/22 130/82           Physical Exam  Constitutional:       Appearance: She is well-developed  HENT:      Head: Normocephalic and atraumatic  Cardiovascular:      Rate and Rhythm: Normal rate  Pulmonary:      Effort: Pulmonary effort is normal  No respiratory distress  Skin:     General: Skin is warm and dry  Neurological:      Mental Status: She is alert     Psychiatric:         Behavior: Behavior normal          Emergency Contacts:    Extended Emergency Contact Information  Primary Emergency Contact: [address] Phone: [phone]  Relation: Spouse

## 2022-07-14 ENCOUNTER — PREP FOR PROCEDURE (OUTPATIENT)
Dept: BARIATRICS | Facility: CLINIC | Age: 47
End: 2022-07-14

## 2022-07-14 DIAGNOSIS — Z98.84 BARIATRIC SURGERY STATUS: Primary | ICD-10-CM

## 2022-09-01 ENCOUNTER — TELEPHONE (OUTPATIENT)
Dept: BARIATRICS | Facility: CLINIC | Age: 47
End: 2022-09-01

## 2022-09-01 NOTE — TELEPHONE ENCOUNTER
Called and LVM for pt to remind them of their EGD on 9/14  Let pt know to call us if they can no longer make procedure

## 2022-09-12 DIAGNOSIS — F32.A DEPRESSION, UNSPECIFIED DEPRESSION TYPE: ICD-10-CM

## 2022-09-13 RX ORDER — ONDANSETRON 2 MG/ML
4 INJECTION INTRAMUSCULAR; INTRAVENOUS ONCE AS NEEDED
Status: CANCELLED | OUTPATIENT
Start: 2022-09-13

## 2022-09-13 RX ORDER — SODIUM CHLORIDE 9 MG/ML
125 INJECTION, SOLUTION INTRAVENOUS CONTINUOUS
Status: CANCELLED | OUTPATIENT
Start: 2022-09-13

## 2022-09-14 ENCOUNTER — HOSPITAL ENCOUNTER (OUTPATIENT)
Dept: GASTROENTEROLOGY | Facility: HOSPITAL | Age: 47
Setting detail: OUTPATIENT SURGERY
Discharge: HOME | End: 2022-09-14
Attending: SURGERY
Payer: COMMERCIAL

## 2022-09-14 ENCOUNTER — ANESTHESIA EVENT (OUTPATIENT)
Dept: GASTROENTEROLOGY | Facility: HOSPITAL | Age: 47
End: 2022-09-14

## 2022-09-14 ENCOUNTER — ANESTHESIA (OUTPATIENT)
Dept: GASTROENTEROLOGY | Facility: HOSPITAL | Age: 47
End: 2022-09-14

## 2022-09-14 VITALS
WEIGHT: 221 LBS | SYSTOLIC BLOOD PRESSURE: 122 MMHG | OXYGEN SATURATION: 96 % | DIASTOLIC BLOOD PRESSURE: 76 MMHG | HEART RATE: 68 BPM | BODY MASS INDEX: 40.67 KG/M2 | HEIGHT: 62 IN | TEMPERATURE: 97.9 F | RESPIRATION RATE: 16 BRPM

## 2022-09-14 DIAGNOSIS — K21.9 GASTROESOPHAGEAL REFLUX DISEASE WITHOUT ESOPHAGITIS: Primary | ICD-10-CM

## 2022-09-14 DIAGNOSIS — K21.9 GASTROESOPHAGEAL REFLUX DISEASE WITHOUT ESOPHAGITIS: ICD-10-CM

## 2022-09-14 DIAGNOSIS — Z98.84 BARIATRIC SURGERY STATUS: ICD-10-CM

## 2022-09-14 LAB
EXT PREGNANCY TEST URINE: NEGATIVE
EXT. CONTROL: NORMAL

## 2022-09-14 PROCEDURE — 43239 EGD BIOPSY SINGLE/MULTIPLE: CPT | Performed by: SURGERY

## 2022-09-14 PROCEDURE — 88305 TISSUE EXAM BY PATHOLOGIST: CPT | Performed by: PATHOLOGY

## 2022-09-14 PROCEDURE — 81025 URINE PREGNANCY TEST: CPT | Performed by: ANESTHESIOLOGY

## 2022-09-14 RX ORDER — ONDANSETRON 2 MG/ML
4 INJECTION INTRAMUSCULAR; INTRAVENOUS ONCE AS NEEDED
Status: DISCONTINUED | OUTPATIENT
Start: 2022-09-14 | End: 2022-09-18 | Stop reason: HOSPADM

## 2022-09-14 RX ORDER — PROPOFOL 10 MG/ML
INJECTION, EMULSION INTRAVENOUS AS NEEDED
Status: DISCONTINUED | OUTPATIENT
Start: 2022-09-14 | End: 2022-09-14

## 2022-09-14 RX ORDER — LIDOCAINE HYDROCHLORIDE 20 MG/ML
INJECTION, SOLUTION EPIDURAL; INFILTRATION; INTRACAUDAL; PERINEURAL AS NEEDED
Status: DISCONTINUED | OUTPATIENT
Start: 2022-09-14 | End: 2022-09-14

## 2022-09-14 RX ORDER — SODIUM CHLORIDE 9 MG/ML
125 INJECTION, SOLUTION INTRAVENOUS CONTINUOUS
Status: DISCONTINUED | OUTPATIENT
Start: 2022-09-14 | End: 2022-09-18 | Stop reason: HOSPADM

## 2022-09-14 RX ADMIN — LIDOCAINE HYDROCHLORIDE 80 MG: 20 INJECTION, SOLUTION EPIDURAL; INFILTRATION; INTRACAUDAL at 10:02

## 2022-09-14 RX ADMIN — SODIUM CHLORIDE 125 ML/HR: 0.9 INJECTION, SOLUTION INTRAVENOUS at 09:32

## 2022-09-14 RX ADMIN — PROPOFOL 200 MG: 10 INJECTION, EMULSION INTRAVENOUS at 10:02

## 2022-09-14 NOTE — H&P
H&P EXAM - Outpatient Endoscopy  [facility] INTRA   Elias Kill 42 y o  female MRN: [id number]  Unit/Bed#:  Encounter: [id number]        Impression:  Heartburn status post sleeve gastrectomy    Plan:Upper endoscopy     Chief Complaint:  Heartburn    Physical Exam: Normal not in acute distress   Chest: Clear to auscultation   Heart: Normal S1 and S2

## 2022-09-14 NOTE — ANESTHESIA POSTPROCEDURE EVALUATION
Post-Op Assessment Note    CV Status:  Stable    Pain management: adequate     Mental Status:  Alert and awake   Hydration Status:  Euvolemic   PONV Controlled:  Controlled   Airway Patency:  Patent      Post Op Vitals Reviewed: Yes      Staff: Anesthesiologist         No complications documented      BP      Temp      Pulse     Resp      SpO2      /76   Pulse 68   Temp 97 9 °F (36 6 °C) (Temporal)   Resp 16   Ht 5' 2" (1 575 m)   Wt 100 kg (221 lb)   LMP 09/07/2022   SpO2 96%   BMI 40 42 kg/m²

## 2022-09-14 NOTE — ANESTHESIA PREPROCEDURE EVALUATION
Procedure:  EGD    Relevant Problems   CARDIO   (+) Hypercholesterolemia      GI/HEPATIC   (+) Bariatric surgery status   (+) Gastroesophageal reflux disease without esophagitis      NEURO/PSYCH   (+) Anxiety disorder   (+) Depression      PULMONARY   (+) Obstructive sleep apnea, adult (Resolved)      Digestive   (+) Postsurgical malabsorption      Other   (+) Obesity, Class III, BMI 40-49 9 (morbid obesity) (HCC) (Resolved)        Physical Exam    Airway    Mallampati score: III  TM Distance: >3 FB  Neck ROM: full     Dental   No notable dental hx     Cardiovascular  Rhythm: regular, Rate: normal, Cardiovascular exam normal    Pulmonary  Pulmonary exam normal Breath sounds clear to auscultation,     Other Findings        Anesthesia Plan  ASA Score- 3     Anesthesia Type- general with ASA Monitors  Additional Monitors:   Airway Plan:           Plan Factors-    Chart reviewed  Patient summary reviewed  Induction-     Postoperative Plan-     Informed Consent- Anesthetic plan and risks discussed with patient  I personally reviewed this patient with the CRNA  Discussed and agreed on the Anesthesia Plan with the CRNA  Travis Montague

## 2022-09-20 PROCEDURE — 88305 TISSUE EXAM BY PATHOLOGIST: CPT | Performed by: PATHOLOGY

## 2022-09-20 RX ORDER — BUPROPION HYDROCHLORIDE 150 MG/1
150 TABLET ORAL 2 TIMES DAILY
Qty: 180 TABLET | Refills: 1 | OUTPATIENT
Start: 2022-09-20

## 2022-09-23 ENCOUNTER — TELEPHONE (OUTPATIENT)
Dept: BARIATRICS | Facility: CLINIC | Age: 47
End: 2022-09-23

## 2022-09-23 DIAGNOSIS — K21.9 GASTROESOPHAGEAL REFLUX DISEASE WITHOUT ESOPHAGITIS: Primary | ICD-10-CM

## 2022-09-23 NOTE — PROGRESS NOTES
24 ph/manometry ordered following EGD and UGI studies  Patient is s/p sleeve gastrectomy w/ symptoms of GERD

## 2022-09-23 NOTE — TELEPHONE ENCOUNTER
Called pt to cancel/ reschedule EGD review appt  Provider will not be in the office  Let pt know we will keep in touch regarding the 24 ph manometry order  We will R/S EGD review depending on what provider says about the manometry order

## 2022-09-26 ENCOUNTER — TELEPHONE (OUTPATIENT)
Dept: BARIATRICS | Facility: CLINIC | Age: 47
End: 2022-09-26

## 2022-09-26 NOTE — TELEPHONE ENCOUNTER
Called pt to let them know   [facility] ordered the 24 hr manometry study  Gave pt central scheduling phone number ([phone]) via   Let pt know they can call with any questions

## 2022-10-06 ENCOUNTER — TELEPHONE (OUTPATIENT)
Dept: GASTROENTEROLOGY | Facility: HOSPITAL | Age: 47
End: 2022-10-06

## 2022-10-06 ENCOUNTER — APPOINTMENT (OUTPATIENT)
Dept: LAB | Age: 47
End: 2022-10-06
Payer: COMMERCIAL

## 2022-10-06 DIAGNOSIS — Z98.84 BARIATRIC SURGERY STATUS: Chronic | ICD-10-CM

## 2022-10-06 DIAGNOSIS — E61.1 IRON DEFICIENCY: ICD-10-CM

## 2022-10-06 DIAGNOSIS — K91.2 POSTSURGICAL MALABSORPTION: ICD-10-CM

## 2022-10-06 LAB
BASOPHILS # BLD AUTO: 0.05 THOUSANDS/ΜL (ref 0–0.1)
BASOPHILS NFR BLD AUTO: 1 % (ref 0–1)
EOSINOPHIL # BLD AUTO: 0.32 THOUSAND/ΜL (ref 0–0.61)
EOSINOPHIL NFR BLD AUTO: 4 % (ref 0–6)
ERYTHROCYTE [DISTWIDTH] IN BLOOD BY AUTOMATED COUNT: 12.6 % (ref 11.6–15.1)
FERRITIN SERPL-MCNC: 206 NG/ML (ref 8–388)
HCT VFR BLD AUTO: 43.6 % (ref 34.8–46.1)
HGB BLD-MCNC: 14.5 G/DL (ref 11.5–15.4)
IMM GRANULOCYTES # BLD AUTO: 0.01 THOUSAND/UL (ref 0–0.2)
IMM GRANULOCYTES NFR BLD AUTO: 0 % (ref 0–2)
IRON SATN MFR SERPL: 26 % (ref 15–50)
IRON SERPL-MCNC: 93 UG/DL (ref 50–170)
LYMPHOCYTES # BLD AUTO: 2.45 THOUSANDS/ΜL (ref 0.6–4.47)
LYMPHOCYTES NFR BLD AUTO: 32 % (ref 14–44)
MCH RBC QN AUTO: 30.3 PG (ref 26.8–34.3)
MCHC RBC AUTO-ENTMCNC: 33.3 G/DL (ref 31.4–37.4)
MCV RBC AUTO: 91 FL (ref 82–98)
MONOCYTES # BLD AUTO: 0.39 THOUSAND/ΜL (ref 0.17–1.22)
MONOCYTES NFR BLD AUTO: 5 % (ref 4–12)
NEUTROPHILS # BLD AUTO: 4.45 THOUSANDS/ΜL (ref 1.85–7.62)
NEUTS SEG NFR BLD AUTO: 58 % (ref 43–75)
NRBC BLD AUTO-RTO: 0 /100 WBCS
PLATELET # BLD AUTO: 374 THOUSANDS/UL (ref 149–390)
PMV BLD AUTO: 10.2 FL (ref 8.9–12.7)
RBC # BLD AUTO: 4.79 MILLION/UL (ref 3.81–5.12)
TIBC SERPL-MCNC: 360 UG/DL (ref 250–450)
VIT B12 SERPL-MCNC: 635 PG/ML (ref 100–900)
WBC # BLD AUTO: 7.67 THOUSAND/UL (ref 4.31–10.16)

## 2022-10-06 PROCEDURE — 36415 COLL VENOUS BLD VENIPUNCTURE: CPT

## 2022-10-06 PROCEDURE — 82728 ASSAY OF FERRITIN: CPT

## 2022-10-06 PROCEDURE — 83550 IRON BINDING TEST: CPT

## 2022-10-06 PROCEDURE — 85025 COMPLETE CBC W/AUTO DIFF WBC: CPT

## 2022-10-06 PROCEDURE — 82607 VITAMIN B-12: CPT

## 2022-10-06 PROCEDURE — 83540 ASSAY OF IRON: CPT

## 2022-10-07 ENCOUNTER — TELEPHONE (OUTPATIENT)
Dept: GASTROENTEROLOGY | Facility: HOSPITAL | Age: 47
End: 2022-10-07

## 2022-10-13 ENCOUNTER — HOSPITAL ENCOUNTER (OUTPATIENT)
Dept: GASTROENTEROLOGY | Facility: HOSPITAL | Age: 47
End: 2022-10-13
Attending: STUDENT IN AN ORGANIZED HEALTH CARE EDUCATION/TRAINING PROGRAM
Payer: COMMERCIAL

## 2022-10-13 VITALS
SYSTOLIC BLOOD PRESSURE: 123 MMHG | TEMPERATURE: 98 F | DIASTOLIC BLOOD PRESSURE: 60 MMHG | RESPIRATION RATE: 18 BRPM | OXYGEN SATURATION: 98 % | HEART RATE: 82 BPM

## 2022-10-13 DIAGNOSIS — K21.9 GASTROESOPHAGEAL REFLUX DISEASE WITHOUT ESOPHAGITIS: ICD-10-CM

## 2022-10-13 PROCEDURE — 91038 ESOPH IMPED FUNCT TEST > 1HR: CPT

## 2022-10-13 PROCEDURE — 91020 GASTRIC MOTILITY STUDIES: CPT

## 2022-10-13 NOTE — PERIOPERATIVE NURSING NOTE
Patient brought in the room and educated on procedure  Lidocaine 2% topical solution inserted via right nostril  Manometry catheter placed via right nostril and secured  10 liquid swallows, 7 viscous swallows performed (patient struggled with consistency of the viscous solution), 1 rapid swallow and 1 rapid drink challenge with 50ccof water performed  Patient tolerated procedure and catheter removed intact  PH probe inserted via the right nostril and secured  Zephr recorder teach back performed and patient verbalized understanding  Patient instructed to return next day to have probe removed  Discharge instructions given and patient ambulated out of the room in stable condition

## 2022-11-04 ENCOUNTER — OFFICE VISIT (OUTPATIENT)
Dept: BARIATRICS | Facility: CLINIC | Age: 47
End: 2022-11-04

## 2022-11-04 VITALS
HEART RATE: 68 BPM | DIASTOLIC BLOOD PRESSURE: 80 MMHG | SYSTOLIC BLOOD PRESSURE: 118 MMHG | WEIGHT: 225 LBS | BODY MASS INDEX: 39.87 KG/M2 | HEIGHT: 63 IN | TEMPERATURE: 95.8 F

## 2022-11-04 DIAGNOSIS — Z48.815 ENCOUNTER FOR SURGICAL AFTERCARE FOLLOWING SURGERY OF DIGESTIVE SYSTEM: Primary | ICD-10-CM

## 2022-11-04 DIAGNOSIS — K21.9 GASTROESOPHAGEAL REFLUX DISEASE WITHOUT ESOPHAGITIS: ICD-10-CM

## 2022-11-04 RX ORDER — OMEPRAZOLE 20 MG/1
20 CAPSULE, DELAYED RELEASE ORAL DAILY
Qty: 30 CAPSULE | Refills: 2 | Status: SHIPPED | OUTPATIENT
Start: 2022-11-04

## 2022-11-04 NOTE — PROGRESS NOTES
OFFICE VISIT - BARIATRIC SURGERY  Richie Nurse 41 y o  female MRN: [id number]  Unit/Bed#:  Encounter: [id number]      HPI:  Richie Nurse is a 41 y o  female status post Laparoscopic Sleeve Gastrectomy by Dr Wilian Birch on 5/8/2019  Comes to the office today to follow up on her EGD    Subjective     The EGD was performed because of persistent symptoms of reflux  At the time of their surgery the patient was 246, and was able to drop down as low as 178  Today, their weight is 225, with a BMI of 39 8  EWL 20%    Tolerating diet: Yes  Main symptoms: Bad taste in mouth, burning in epigastrum  Worse with food: Yes  Nausea: No  Vomiting: No  Diarrhea: Occoisionally  Duration of symptoms: 1-2 hours  Smoking: No  Alcohol use: Occiasional  NSAID use: No  Caffeine use: 1 cup a day  Current treatment: None  Past medical history significant for: None  Previous cholecystectomy or pertinent abdominal surgeries: None  Previous EGD: 9/14/22  Previous Upper GI: 6/2/2022   Previous Manometry: 10/13/22  Ambulation is not impaired  Review of Systems   Constitutional: Negative  Respiratory: Negative  Cardiovascular: Negative  Neurological: Negative  Historical Information   Past Medical History:   Diagnosis Date   • Anemia    • Bariatric surgery status    • CPAP (continuous positive airway pressure) dependence    • Depression    • History of anxiety    • Left knee pain     "occas"   • Low back pain     "mayra if standing for a while"   • Morbid obesity (HCC)    • Motion sickness    • Obesity    • PONV (postoperative nausea and vomiting)    • Postgastrectomy malabsorption    • Prediabetes    • Right ankle pain     " occas"   • Shortness of breath     "exertional"   • Sleep apnea     Mild   • Walks frequently     for exercise   • Wears glasses      Past Surgical History:   Procedure Laterality Date   • ANKLE SURGERY Bilateral     Right with screws and plate implant   Left ACL repair Resolved: 1997   • ANTERIOR CRUCIATE LIGAMENT REPAIR Left     screws implanted   • BARIATRIC SURGERY  05/08/2018   • KNEE CARTILAGE SURGERY      right   • KNEE SURGERY Left    • CT EGD TRANSORAL BIOPSY SINGLE/MULTIPLE N/A 3/21/2018    Procedure: ESOPHAGOGASTRODUODENOSCOPY (EGD) with bx;  Surgeon: Tommie Moore MD;  [facility]: [facility]; Service: Bariatrics   • CT LAP, ELIN RESTRICT PROC, LONGITUDINAL GASTRECTOMY N/A 5/8/2018    Procedure: LAPAROSCOPIC SLEEVE GASTRECTOMY WITH ROBOTICS;  Surgeon: Tommie Moore MD;  Location: [facility];  Service: Bariatrics   • REDUCTION MAMMAPLASTY Bilateral     Resolved: 2004   • WISDOM TOOTH EXTRACTION       Social History   Social History     Substance and Sexual Activity   Alcohol Use Yes   • Alcohol/week: 0 0 standard drinks    Comment: social      Social History     Substance and Sexual Activity   Drug Use No     Social History     Tobacco Use   Smoking Status Never Smoker   Smokeless Tobacco Never Used       Objective       Current Vitals:   Blood Pressure: 118/80 (11/04/22 1300)  Pulse: 68 (11/04/22 1300)  Temperature: (!) 95 8 °F (35 4 °C) (11/04/22 1300)  Temp Source: Tympanic (11/04/22 1300)  Height: 5' 3" (160 cm) (11/04/22 1300)  Weight - Scale: 102 kg (225 lb) (11/04/22 1300)    Invasive Devices  Report    None                 Physical Exam  Constitutional:       General: She is not in acute distress  Appearance: Normal appearance  She is not toxic-appearing  Cardiovascular:      Rate and Rhythm: Normal rate and regular rhythm  Pulses: Normal pulses  Heart sounds: Normal heart sounds  Pulmonary:      Effort: Pulmonary effort is normal  No respiratory distress  Breath sounds: Normal breath sounds  No wheezing  Abdominal:      General: Bowel sounds are normal  There is no distension  Palpations: Abdomen is soft  Tenderness: There is no abdominal tenderness  Neurological:      Mental Status: She is alert             Pathology, and Other Studies: I have personally reviewed pertinent reports  and I have personally reviewed pertinent films in PACS       UGI  FINDINGS:     The esophagus is normal in caliber  Esophageal motility is normal and emptying of contrast from the esophagus is prompt  There is no evidence of discrete mucosal mass, ulceration or fold thickening identified      There is mild narrowing of the gastric lumen in keeping with history of sleeve gastrectomy  The visualized gastric mucosa is unremarkable  No discrete penetrating ulcers or masses are seen      Contrast empties promptly into the duodenum  The duodenum is normal in caliber  The ligament of Treitz/duodenojejunal junction lies in a normal position      A small amount of gastroesophageal reflux was observed during the exam      There is a small reducible hiatal hernia  There is a small luminal outpouching seen along the left aspect of the hiatal hernia which is favored to be postsurgical in etiology      IMPRESSION:  1  Postsurgical changes from prior sleeve gastrectomy with small hiatal hernia  There is a small luminal outpouching along the left aspect of the hernia which is favored to be postsurgical in etiology  2   Mild gastric esophageal reflux was noted during the exam     EGD  FINDINGS:  · Regular Z-line 35 cm from the incisors  · Previous sleeve gastrectomy  No evidence of sleeve migration  No evidence of hiatal hernia  No evidence of stricture at the incisura  · Performed single forceps biopsy to rule out H  pylori in the antrum  · The esophagus, stomach and 2nd part of the duodenum appeared normal         SPECIMENS:  * No specimens in log *        IMPRESSION:  Status post sleeve gastrectomy  Normal findings  No evidence of hiatal hernia  No evidence of sleeve migration  No evidence of stricture at the incisura  No evidence of Jackson's esophagus    Pathology from EGD   Lab Results   Component Value Date    FINALDX  09/14/2022     A   Stomach, biopsy:  - Benign gastric antral mucosa with mild chronic nonspecific inflammation   - No Helicobacter pylori organisms are identified with routine H&E stain  MANOMETRY/pH Study  Indication- GERD     Esophageal manometry  Esophageal motility- 7/10 swallows demonstrated normal esophageal contractibility pattern with mean DCI of 2576 mmHg  s cm     3/10 swallows demonstrated borderline low distal latency but pattern appeared to be overall normal     Rapid swallow index is less than 1     [place] classification- normal esophageal motility        24 hour pH study-off PPI for 7 days     Acid exposure time in upright position is 2 8%  Acid exposure time in recumbent position is 1 0%  Acid exposure time overall is 2%     DeMeester score 6 7        50 episodes of reflux overall  45 episodes in the upright position- 25 episodes of acid reflux, 20 episodes of weakly acid reflux  5 episodes of reflux in recumbent position- 4 episodes of acid reflux, 1 episode of weakly acid reflux        The correlation had symptom index of 100% and symptom associated probability of 100% with reported symptoms of heartburn        Study showed elevated amount of episodes of acid reflux and good symptom correlation  Acid exposure time was within physiological range      Assessment/PLAN:    Afshan Polk is a 41 y o  female status post Laparoscopic Sleeve Gastrectomy on 5/8/2019 with Dr Osei Mancia      Workup thus far reviewed and discussed with patient  --------------------------------------------------------------------    - Due to the issue of GERD and weight recdivism, the patient is to be enrolled in [employer] with a planned Robotic ISHMAEL - Level 2  - Will add PPI  - We will refer for medical clearance preoperatively  - Continue to meet with Dieticians and Social Workers  - Follow up as directed              Mariajose Villar DO  Bariatric Surgery Fellow  [facility]  11/4/2022  1:08 PM

## 2022-11-26 ENCOUNTER — OFFICE VISIT (OUTPATIENT)
Dept: URGENT CARE | Age: 47
End: 2022-11-26

## 2022-11-26 VITALS
HEIGHT: 62 IN | TEMPERATURE: 97.9 F | RESPIRATION RATE: 18 BRPM | HEART RATE: 79 BPM | OXYGEN SATURATION: 99 % | WEIGHT: 225 LBS | BODY MASS INDEX: 41.41 KG/M2

## 2022-11-26 DIAGNOSIS — J02.9 SORE THROAT: Primary | ICD-10-CM

## 2022-11-26 DIAGNOSIS — R05.1 ACUTE COUGH: ICD-10-CM

## 2022-11-26 LAB — S PYO AG THROAT QL: NEGATIVE

## 2022-11-26 RX ORDER — AZELASTINE 1 MG/ML
1 SPRAY, METERED NASAL 2 TIMES DAILY
Qty: 1 ML | Refills: 2 | Status: SHIPPED | OUTPATIENT
Start: 2022-11-26

## 2022-11-26 RX ORDER — BENZONATATE 200 MG/1
200 CAPSULE ORAL 3 TIMES DAILY PRN
Qty: 20 CAPSULE | Refills: 0 | Status: SHIPPED | OUTPATIENT
Start: 2022-11-26

## 2022-11-26 RX ORDER — GUAIFENESIN, PSEUDOEPHEDRINE HYDROCHLORIDE 600; 60 MG/1; MG/1
1 TABLET, EXTENDED RELEASE ORAL EVERY 12 HOURS
Qty: 14 TABLET | Refills: 0 | Status: SHIPPED | OUTPATIENT
Start: 2022-11-26 | End: 2022-12-03

## 2022-11-26 NOTE — PROGRESS NOTES
[facility]        NAME: Crystal Samuels is a 41 y o  female  : 1975    MRN: [id number]  DATE: 2022  TIME: 4:23 PM    Assessment and Plan   Acute cough [R05 1]  1  Acute cough  Covid/Flu-Office Collect      2  Sore throat  Throat culture    POCT rapid strepA    benzonatate (TESSALON) 200 MG capsule    azelastine (ASTELIN) 0 1 % nasal spray    pseudoephedrine-guaifenesin (MUCINEX D)  MG per tablet      Rapid strep negative      Patient Instructions   Mucinex D for congestion and cough  Tessalon perles for cough  Astelin nasal spray for congestion  Ibuprofen and/or tylenol for fever, headaches  To soothe sore throat: Tea with honey, salt water gargles 4 times day (1/2 teaspoon of salt in 8ox water), and/or Chloraseptic throat spray  Follow up with PCP in 3-5 days  Proceed to  ER if symptoms worsen  Chief Complaint     Chief Complaint   Patient presents with   • Sore Throat     Sore throat, cough, nasal congestion mark today         History of Present Illness       This is a 27-year-old female presenting complaint of cough  Patient is a  and says there has been Jerodpatrick in her facility, but she has not had close contact with any positive individuals  Mrs  Sore throat, dry cough, minimal postnasal drip, upset stomach, left ear pressure, sinus congestion on the left side, and mild chest congestion  Symptoms began earlier today  Denies runny nose, eye pain, headache, nausea, vomiting, abdominal pain, fever, chills, chest pain, or shortness or breath  Has not tried any OTC remedies  Cough  This is a new problem  The current episode started today  The problem has been gradually worsening  The cough is non-productive  Associated symptoms include ear pain, nasal congestion, postnasal drip and a sore throat  Pertinent negatives include no chills, fever, headaches, rhinorrhea or shortness of breath  She has tried nothing for the symptoms         Review of Systems   Review of Systems   Constitutional: Negative for chills, fatigue and fever  HENT: Positive for congestion, ear pain, postnasal drip, sinus pressure, sinus pain and sore throat  Negative for rhinorrhea  Eyes: Negative  Respiratory: Positive for cough  Negative for shortness of breath  Cardiovascular: Negative  Gastrointestinal:        Admits upset stomach   Endocrine: Negative  Genitourinary: Negative  Musculoskeletal: Negative  Skin: Negative  Allergic/Immunologic: Negative  Neurological: Negative for headaches  Hematological: Negative  Psychiatric/Behavioral: Negative            Current Medications       Current Outpatient Medications:   •  azelastine (ASTELIN) 0 1 % nasal spray, 1 spray into each nostril 2 (two) times a day Use in each nostril as directed, Disp: 1 mL, Rfl: 2  •  benzonatate (TESSALON) 200 MG capsule, Take 1 capsule (200 mg total) by mouth 3 (three) times a day as needed for cough, Disp: 20 capsule, Rfl: 0  •  pseudoephedrine-guaifenesin (MUCINEX D)  MG per tablet, Take 1 tablet by mouth every 12 (twelve) hours for 7 days, Disp: 14 tablet, Rfl: 0  •  buPROPion (WELLBUTRIN XL) 150 mg 24 hr tablet, TAKE 1 TABLET BY MOUTH TWICE A DAY (Patient not taking: Reported on 11/4/2022), Disp: 180 tablet, Rfl: 1  •  CALCIUM PO, Take 1 each by mouth 3 (three) times a day   (Patient not taking: Reported on 11/26/2022), Disp: , Rfl:   •  Diclofenac Sodium (VOLTAREN) 1 %, Apply 2 g topically 4 (four) times a day (Patient not taking: Reported on 11/26/2022), Disp: 50 g, Rfl: 1  •  ergocalciferol (VITAMIN D2) 50,000 units, Take 1 capsule (50,000 Units total) by mouth 2 (two) times a week (Patient taking differently: Take 50,000 Units by mouth once a week), Disp: 24 capsule, Rfl: 0  •  Multiple Vitamin (MULTIVITAMIN) tablet, Take 1 tablet by mouth daily (Patient not taking: Reported on 11/26/2022), Disp: , Rfl:   •  omeprazole (PriLOSEC) 20 mg delayed release capsule, Take 1 capsule (20 mg total) by mouth daily (Patient not taking: Reported on 11/26/2022), Disp: 30 capsule, Rfl: 2    Current Allergies     Allergies as of 11/26/2022 - Reviewed 11/26/2022   Allergen Reaction Noted   • Nsaids GI Intolerance 11/26/2022            The following portions of the patient's history were reviewed and updated as appropriate: allergies, current medications, past family history, past medical history, past social history, past surgical history and problem list      Past Medical History:   Diagnosis Date   • Anemia    • Bariatric surgery status    • CPAP (continuous positive airway pressure) dependence    • Depression    • History of anxiety    • Left knee pain     "occas"   • Low back pain     "mayra if standing for a while"   • Morbid obesity (HCC)    • Motion sickness    • Obesity    • PONV (postoperative nausea and vomiting)    • Postgastrectomy malabsorption    • Prediabetes    • Right ankle pain     " occas"   • Shortness of breath     "exertional"   • Sleep apnea     Mild   • Walks frequently     for exercise   • Wears glasses        Past Surgical History:   Procedure Laterality Date   • ANKLE SURGERY Bilateral     Right with screws and plate implant  Left ACL repair Resolved: 1997   • ANTERIOR CRUCIATE LIGAMENT REPAIR Left     screws implanted   • BARIATRIC SURGERY  05/08/2018   • KNEE CARTILAGE SURGERY      right   • KNEE SURGERY Left    • NE EGD TRANSORAL BIOPSY SINGLE/MULTIPLE N/A 3/21/2018    Procedure: ESOPHAGOGASTRODUODENOSCOPY (EGD) with bx;  Surgeon: Tamiko Urrutia MD;  Location: [facility];   Service: Bariatrics   • NE LAP, ELIN RESTRICT PROC, LONGITUDINAL GASTRECTOMY N/A 5/8/2018    Procedure: LAPAROSCOPIC SLEEVE GASTRECTOMY WITH ROBOTICS;  Surgeon: Tamiko Urrutia MD;  [facility];  Service: Bariatrics   • REDUCTION MAMMAPLASTY Bilateral     Resolved: 2004   • WISDOM TOOTH EXTRACTION         Family History   Problem Relation Age of Onset   • Hypertension Mother    • Heart attack Mother    • Diabetes type II Mother         Mellitus   • Hypertension Father    • Diabetes type II Father         Mellitus   • No Known Problems Sister    • No Known Problems Maternal Grandmother    • No Known Problems Maternal Grandfather    • No Known Problems Paternal Grandmother    • No Known Problems Paternal Grandfather    • No Known Problems Sister    • No Known Problems Paternal Aunt          Medications have been verified  Objective   Pulse 79   Temp 97 9 °F (36 6 °C)   Resp 18   Ht 5' 2" (1 575 m)   Wt 102 kg (225 lb)   SpO2 99%   BMI 41 15 kg/m²   No LMP recorded  Physical Exam     Physical Exam  Constitutional:       General: She is not in acute distress  Appearance: She is well-developed and normal weight  She is not ill-appearing  HENT:      Head: Normocephalic and atraumatic  Right Ear: Tympanic membrane and ear canal normal       Left Ear: Tympanic membrane and ear canal normal       Nose: No congestion or rhinorrhea  Mouth/Throat:      Mouth: Mucous membranes are moist       Pharynx: Posterior oropharyngeal erythema present  No oropharyngeal exudate  Tonsils: No tonsillar exudate  Eyes:      Conjunctiva/sclera: Conjunctivae normal       Pupils: Pupils are equal, round, and reactive to light  Cardiovascular:      Rate and Rhythm: Normal rate and regular rhythm  Heart sounds: Normal heart sounds  No murmur heard  Pulmonary:      Effort: Pulmonary effort is normal  No respiratory distress  Breath sounds: Normal breath sounds  No stridor  No wheezing, rhonchi or rales  Abdominal:      General: Bowel sounds are normal       Palpations: Abdomen is soft  Musculoskeletal:      Cervical back: Normal range of motion and neck supple  Lymphadenopathy:      Cervical: Cervical adenopathy present  Skin:     General: Skin is warm and dry  Neurological:      General: No focal deficit present        Mental Status: She is alert and oriented to person, place, and time     Psychiatric:         Mood and Affect: Mood normal          Behavior: Behavior normal        Milan Andersen PA-C

## 2022-11-26 NOTE — PATIENT INSTRUCTIONS
Mucinex D for congestion and cough  Tessalon perles for cough  Astelin nasal spray for congestion  Ibuprofen and/or tylenol for fever, headaches  To soothe sore throat: Tea with honey, salt water gargles 4 times day (1/2 teaspoon of salt in 8ox water), and/or Chloraseptic throat spray  Follow up with PCP in 3-5 days  Proceed to  ER if symptoms worsen

## 2022-11-27 LAB — BACTERIA THROAT CULT: NORMAL

## 2022-11-28 LAB
BACTERIA THROAT CULT: NORMAL
FLUAV RNA RESP QL NAA+PROBE: NEGATIVE
FLUBV RNA RESP QL NAA+PROBE: NEGATIVE
SARS-COV-2 RNA RESP QL NAA+PROBE: NEGATIVE

## 2022-12-06 ENCOUNTER — OFFICE VISIT (OUTPATIENT)
Dept: URGENT CARE | Age: 47
End: 2022-12-06

## 2022-12-06 VITALS
DIASTOLIC BLOOD PRESSURE: 74 MMHG | SYSTOLIC BLOOD PRESSURE: 120 MMHG | HEART RATE: 104 BPM | TEMPERATURE: 98.9 F | RESPIRATION RATE: 18 BRPM | OXYGEN SATURATION: 99 %

## 2022-12-06 DIAGNOSIS — J01.10 ACUTE NON-RECURRENT FRONTAL SINUSITIS: Primary | ICD-10-CM

## 2022-12-06 RX ORDER — AMOXICILLIN AND CLAVULANATE POTASSIUM 875; 125 MG/1; MG/1
1 TABLET, FILM COATED ORAL EVERY 12 HOURS SCHEDULED
Qty: 14 TABLET | Refills: 0 | Status: SHIPPED | OUTPATIENT
Start: 2022-12-06 | End: 2022-12-13

## 2022-12-06 NOTE — LETTER
December 6, 2022     Patient: Roxi Brooks   YOB: 1975   Date of Visit: 12/6/2022       To Whom it May Concern: Galileo Martínez was seen in my clinic on 12/6/2022  She may return to work on 12/7/2022  If you have any questions or concerns, please don't hesitate to call           Sincerely,          Sangeetha Jarrett DO        CC: No Recipients

## 2022-12-06 NOTE — PROGRESS NOTES
[facility]        NAME: Tita Lopez is a 41 y o  female  : 1975    MRN: [id number]  DATE: 2022  TIME: 6:04 PM    Assessment and Orders   Acute non-recurrent frontal sinusitis [J01 10]  1  Acute non-recurrent frontal sinusitis  amoxicillin-clavulanate (AUGMENTIN) 875-125 mg per tablet            Plan and Discussion      Patient's symptoms have persisted for greater than 10 days with new onset of fevers and localized pain to the frontal sinus  At this time, likely bacterial sinus infection  Will treat with 7 days of Augmentin  Risks and benefits discussed  Patient understands and agrees with the plan  Follow up with PCP  Chief Complaint     Chief Complaint   Patient presents with   • Cough     Patient relates being seen last week, symptoms have worsened  Sinus and ear pressure         History of Present Illness       HPI     Patient is a 28-year-old female who presents with worsening URI symptoms  Patient states that about a week and half ago she was seen for URI and has been treating herself with over-the-counter medications  She states that initially she was improving but as of 2 days ago she has developed fevers and localized pain to the frontal right sinus  Review of Systems   Review of Systems   Constitutional: Positive for chills and fever  HENT: Positive for sinus pain (Right frontal)  Respiratory: Negative for cough and shortness of breath  Musculoskeletal: Positive for arthralgias  Neurological: Positive for headaches  Negative for dizziness           Current Medications       Current Outpatient Medications:   •  amoxicillin-clavulanate (AUGMENTIN) 875-125 mg per tablet, Take 1 tablet by mouth every 12 (twelve) hours for 7 days, Disp: 14 tablet, Rfl: 0  •  azelastine (ASTELIN) 0 1 % nasal spray, 1 spray into each nostril 2 (two) times a day Use in each nostril as directed, Disp: 1 mL, Rfl: 2  •  benzonatate (TESSALON) 200 MG capsule, Take 1 capsule (200 mg total) by mouth 3 (three) times a day as needed for cough, Disp: 20 capsule, Rfl: 0  •  buPROPion (WELLBUTRIN XL) 150 mg 24 hr tablet, TAKE 1 TABLET BY MOUTH TWICE A DAY (Patient not taking: Reported on 11/4/2022), Disp: 180 tablet, Rfl: 1  •  CALCIUM PO, Take 1 each by mouth 3 (three) times a day   (Patient not taking: Reported on 11/26/2022), Disp: , Rfl:   •  Diclofenac Sodium (VOLTAREN) 1 %, Apply 2 g topically 4 (four) times a day (Patient not taking: Reported on 11/26/2022), Disp: 50 g, Rfl: 1  •  ergocalciferol (VITAMIN D2) 50,000 units, Take 1 capsule (50,000 Units total) by mouth 2 (two) times a week (Patient taking differently: Take 50,000 Units by mouth once a week), Disp: 24 capsule, Rfl: 0  •  Multiple Vitamin (MULTIVITAMIN) tablet, Take 1 tablet by mouth daily (Patient not taking: Reported on 11/26/2022), Disp: , Rfl:   •  omeprazole (PriLOSEC) 20 mg delayed release capsule, Take 1 capsule (20 mg total) by mouth daily (Patient not taking: Reported on 11/26/2022), Disp: 30 capsule, Rfl: 2    Current Allergies     Allergies as of 12/06/2022 - Reviewed 12/06/2022   Allergen Reaction Noted   • Nsaids GI Intolerance 11/26/2022            The following portions of the patient's history were reviewed and updated as appropriate: allergies, current medications, past family history, past medical history, past social history, past surgical history and problem list      Past Medical History:   Diagnosis Date   • Anemia    • Bariatric surgery status    • CPAP (continuous positive airway pressure) dependence    • Depression    • History of anxiety    • Left knee pain     "occas"   • Low back pain     "mayra if standing for a while"   • Morbid obesity (HCC)    • Motion sickness    • Obesity    • PONV (postoperative nausea and vomiting)    • Postgastrectomy malabsorption    • Prediabetes    • Right ankle pain     " occas"   • Shortness of breath     "exertional"   • Sleep apnea     Mild   • Walks frequently     for exercise   • Wears glasses        Past Surgical History:   Procedure Laterality Date   • ANKLE SURGERY Bilateral     Right with screws and plate implant  Left ACL repair Resolved: 1997   • ANTERIOR CRUCIATE LIGAMENT REPAIR Left     screws implanted   • BARIATRIC SURGERY  05/08/2018   • KNEE CARTILAGE SURGERY      right   • KNEE SURGERY Left    • GA EGD TRANSORAL BIOPSY SINGLE/MULTIPLE N/A 3/21/2018    Procedure: ESOPHAGOGASTRODUODENOSCOPY (EGD) with bx;  Surgeon: Marta Marie MD;  Location: [facility]; Service: Bariatrics   • GA LAP, ELIN RESTRICT PROC, LONGITUDINAL GASTRECTOMY N/A 5/8/2018    Procedure: LAPAROSCOPIC SLEEVE GASTRECTOMY WITH ROBOTICS;  Surgeon: Marta Marie MD;  Location: [facility];  Service: Bariatrics   • REDUCTION MAMMAPLASTY Bilateral     Resolved: 2004   • WISDOM TOOTH EXTRACTION         Family History   Problem Relation Age of Onset   • Hypertension Mother    • Heart attack Mother    • Diabetes type II Mother         Mellitus   • Hypertension Father    • Diabetes type II Father         Mellitus   • No Known Problems Sister    • No Known Problems Maternal Grandmother    • No Known Problems Maternal Grandfather    • No Known Problems Paternal Grandmother    • No Known Problems Paternal Grandfather    • No Known Problems Sister    • No Known Problems Paternal Aunt          Medications have been verified  Objective   /74   Pulse 104   Temp 98 9 °F (37 2 °C)   Resp 18   SpO2 99%   No LMP recorded  Physical Exam     Physical Exam  Constitutional:       General: She is not in acute distress  HENT:      Head:        Nose: No rhinorrhea  Mouth/Throat:      Pharynx: No oropharyngeal exudate or posterior oropharyngeal erythema  Cardiovascular:      Rate and Rhythm: Tachycardia present  Pulmonary:      Effort: Pulmonary effort is normal  No respiratory distress  Neurological:      General: No focal deficit present        Mental Status: She is alert and oriented to person, place, and time  Psychiatric:         Mood and Affect: Mood normal          Behavior: Behavior normal          Thought Content:  Thought content normal          Judgment: Judgment normal                Bryon Holstein Viotto DO

## 2022-12-19 NOTE — PROGRESS NOTES
Bariatric Behavioral Health Evaluation:Revision pathway Level 2  Presenting Problem: Ziyad Wang  is a 41 y o    female   :  1975   Presents for a bariatric evaluation for surgery  Patient has history weight loss attempts and has struggled with success  Due to the issue of GERD and weight recdivism, the patient is to be enrolled in Revision Pathway with a planned Robotic ISHMAEL - Level 2     Is the patient seeking Bariatric Surgery Eval?  yes  how long has patient been considering, surgery? The pt reported that about a year ago she had an increase in reflex aware that her eating schedule had been off because of her work hours, but she is careful to stay away from certain  Food (ex: tomato sauce)  and when started on omeprazole she has felt better  The pt explained her weight gain is also because she has been eating unhealthy and has gained about 30 pounds  Realizes Post- Op Requirements? Yes but will learn more through the program      Pre-morbid level of function and history of present illness: (any kind of medical conditions) GERD    Support system: family-spouse, drt 15 yrs old  in March and two sisters  Living situation: , drt and one sister  Work: Full time  [employer]     Physical Activity: low  Family hx of obesity: both parents   The pt reported she grew up with her parents and her two sisters and her brother  Traditions-,  rules/routines around food continued to adulthood: family gatherings always revolved around food     Current eating habits: grazer, enjoys sweets, three meals a day, most of the time portion control, does not always eat slow, Hydration-water 30 oz two a day, crystal light sometimes, coffee and hot  tea     Mindless eating:chips and food that is crunchy  Stress eating: no  Emotional eating: yes sweets      Mental Health, Trauma and Substance use Assessment    Psychiatric/Psychological Treatment Diagnosis: Per pt hx of Depression and Anxiety  Not currently on MH meds  History of Eating Disorders: no      Outpatient Counselor: no     Psychiatrist no   Have you had any Mental Health Treatment? no    Family History-Drug or alcohol hx: no  Have you had any Drug and/or Alcohol use and treatment history: no  Have you had any Substance Use Treatment? no  Tobacco/Vaping History: no     Domestic Violence: no    Abuse or Trauma History: no      Risk Assessment    Supports: reported to be intact  Risk of harm to self or others: (SI/HI) none noted during evaluation  No HI/SI        Presence of Audio/Visual Hallucinations: no    Access to weapons: not reported during interview  Observation: This interview only (SW and RD will continue working with the patient throughout the program)  Based on the previous information, the client presents the following risk of harm to self or others: low      Physical/Mental Health Status:     Appearance: appropriate  Sociability: average  Affect: appropriate  Mood: calm  Thought Process: coherent  Speech: normal  Content: no impairment  Orientation: person  Yes , place  Yes , and time  Yes   Insight: emotional  good    BARIATRIC SURGERY EDUCATION CHECKLIST    Patient has received the following education related to the bariatric surgery process and understands:    Patients may be required to complete a psychiatric evaluation and receive clearance for surgery from mental health provider  Patients who undergo weight loss surgery are at higher risk of increased mental health concerns and suicide attempts  Patients may be required to complete a full substance abuse evaluation and then complete all treatment recommendations prior to surgery  If diagnosis of abuse/dependence results, patient may be required to remain sober for one (1) year before having bariatric surgery  Patients on psychiatric medications should check with their provider to discuss psychiatric medications and the changes in absorption    Patient should discuss all time release medications with provider and take all medications as prescribed  The recommendation is that there is no use of any tobacco products, Hookah or vape's for the bariatric post-operation patient  Bariatric surgery patients should not consume alcohol as a post-operative patient as it may increase risk of numerous health conditions including but not limited to alcohol abuse and ulcers  There is a possibility of weight regain if patient does not follow all program guidelines and recommendations  Bariatric surgery patients should exercise thirty (30) to sixty (60) minutes per day to maintain post-surgical weight loss  Research indicates that bariatric patients are more successful when they see a therapist for up to two (2) years post-op  Patients will follow all medical and dietary recommendations provided  Patient will keep all scheduled appointments and follow up with their physician for a minimum of five (5) years  Patient will take all vitamins as recommended  Post-operative vitamins are life-long  There is a goal month set  All requirements should be met by this time  Don't wait to get started! There is a deadline month set  All requirements must be finished by this time and if not, the patient will be halted in the surgery process  The patient can be referred to the medical weight management program or can come back to the surgical program once the unfinished tasks from the previous program are completed  Female patients of childbearing years are informed that pregnancy is not recommended until 12 months post-op  Recommendations: Recommended for surgery  yes         Note :  Patient presented for behavioral health evaluation for the bariatric program   PT reports hx of Mental Health  No current therapy  No hx of  Psychiatry  No Drug and/or Alcohol abuse or treatment  No tobacco use    Patient educated regarding the impact of nicotine and alcohol on the post surgery bariatric patient  Patient has no family history of tobacco and alcohol addiction  Patient meets criteria for surgery at this time and is referred to the bariatric surgeon  Goal: 1-two days during the week will go to the gym for  30 minutes 2-practice 30/60 rule  Next Appointment: 1/13/23 JANELL  : Michael CAVANAUGH

## 2022-12-19 NOTE — PROGRESS NOTES
Bariatric Nutrition Assessment Note  Due to the issue of GERD and weight recdivism, the patient is to be enrolled in Revision Pathway with a planned Robotic ISHMAEL - Level 2    Type of surgery    Vertical sleeve gastrectomy  Surgery Date: 5/8/2018  4 5 years  post-op  Surgeon: Dr Indu Greenwood  41 y o   female   Ht 5' 3" (1 6 m)   Wt 104 kg (230 lb)   BMI 40 74 kg/m²     [place] Equation:     Weight [id number]/day  Estimated calories for weight loss 991-1491kcal/day ( 1-2# per wk wt loss - sedentary )  Estimated protein needs 64-96g/day (1 0-1 5 gms/kg IBW )   Estimated fluid needs 1920-2240ml/day (30-35 ml/kg IBW )      Weight History   Weight regain started about 18 months ago  Lowest post-op 178lbs  Maintained 200lbs the longest    2/14/2018 initial office visit= 246 5lbs  Weight on Day of Weight Loss Surgery: 229 7lbs  Weight in (lb) to have BMI = 25: 141lbs  Pre-Op Excess Wt: 105 5lbs  5/29/2019 Post-op Eujht=096 5lbs  Post-Op Wt Loss: 16 5#/ 16% EBWL in 4 5 year(s)    Review of History and Medications   Past Medical History:   Diagnosis Date   • Anemia    • Bariatric surgery status    • CPAP (continuous positive airway pressure) dependence    • Depression    • History of anxiety    • Left knee pain     "occas"   • Low back pain     "mayra if standing for a while"   • Morbid obesity (HCC)    • Motion sickness    • Obesity    • PONV (postoperative nausea and vomiting)    • Postgastrectomy malabsorption    • Prediabetes    • Right ankle pain     " occas"   • Shortness of breath     "exertional"   • Sleep apnea     Mild   • Walks frequently     for exercise   • Wears glasses      Past Surgical History:   Procedure Laterality Date   • ANKLE SURGERY Bilateral     Right with screws and plate implant   Left ACL repair Resolved: 1997   • ANTERIOR CRUCIATE LIGAMENT REPAIR Left     screws implanted   • BARIATRIC SURGERY  05/08/2018   • KNEE CARTILAGE SURGERY right   • KNEE SURGERY Left    • [place] EGD TRANSORAL BIOPSY SINGLE/MULTIPLE N/A 3/21/2018    Procedure: ESOPHAGOGASTRODUODENOSCOPY (EGD) with bx;  Surgeon: Ronald Davis MD;  Location: [facility]; Service: Bariatrics   • IA LAP, ELIN RESTRICT PROC, LONGITUDINAL GASTRECTOMY N/A 5/8/2018    Procedure: LAPAROSCOPIC SLEEVE GASTRECTOMY WITH ROBOTICS;  Surgeon: Ronald Davis MD;  Location: [facility];  Service: Bariatrics   • REDUCTION MAMMAPLASTY Bilateral     Resolved: 2004   • WISDOM TOOTH EXTRACTION       Social History     Socioeconomic History   • Marital status: /Civil Union     Spouse name: Not on file   • Number of children: Not on file   • Years of education: Not on file   • Highest education level: Not on file   Occupational History     Comment: [employer]   Tobacco Use   • Smoking status: Never   • Smokeless tobacco: Never   Vaping Use   • Vaping Use: Never used   Substance and Sexual Activity   • Alcohol use:  Yes     Alcohol/week: 0 0 standard drinks     Comment: social    • Drug use: No   • Sexual activity: Yes     Partners: Male     Birth control/protection: None     Comment: Carrier of STD   Other Topics Concern   • Not on file   Social History Narrative    Denied: History of Exercising Regularly     Social Determinants of Health     Financial Resource Strain: Not on file   Food Insecurity: Not on file   Transportation Needs: Not on file   Physical Activity: Not on file   Stress: Not on file   Social Connections: Not on file   Intimate Partner Violence: Not on file   Housing Stability: Not on file       Current Outpatient Medications:   •  azelastine (ASTELIN) 0 1 % nasal spray, 1 spray into each nostril 2 (two) times a day Use in each nostril as directed, Disp: 1 mL, Rfl: 2  •  benzonatate (TESSALON) 200 MG capsule, Take 1 capsule (200 mg total) by mouth 3 (three) times a day as needed for cough, Disp: 20 capsule, Rfl: 0  •  buPROPion (WELLBUTRIN XL) 150 mg 24 hr tablet, TAKE 1 TABLET BY MOUTH TWICE A DAY (Patient not taking: Reported on 11/4/2022), Disp: 180 tablet, Rfl: 1  •  CALCIUM PO, Take 1 each by mouth 3 (three) times a day   (Patient not taking: Reported on 11/26/2022), Disp: , Rfl:   •  Diclofenac Sodium (VOLTAREN) 1 %, Apply 2 g topically 4 (four) times a day (Patient not taking: Reported on 11/26/2022), Disp: 50 g, Rfl: 1  •  ergocalciferol (VITAMIN D2) 50,000 units, Take 1 capsule (50,000 Units total) by mouth 2 (two) times a week (Patient taking differently: Take 50,000 Units by mouth once a week), Disp: 24 capsule, Rfl: 0  •  Multiple Vitamin (MULTIVITAMIN) tablet, Take 1 tablet by mouth daily (Patient not taking: Reported on 11/26/2022), Disp: , Rfl:   •  omeprazole (PriLOSEC) 20 mg delayed release capsule, Take 1 capsule (20 mg total) by mouth daily (Patient not taking: Reported on 11/26/2022), Disp: 30 capsule, Rfl: 2     Food Intake and Lifestyle Assessment   Food Intake Assessment completed via usual diet recall  Just switched to night shift  Breakfast: just crack an egg  Coffee 16oz travel mug with half and half and sweet n low  Snack: none   Lunch: dinner leftovers  Snack: none  Dinner: 5:30-6pm: chicken grilled, potato or rice, vegetable  Snack: mindless snacking- boredom  Beverage intake: 30 oz water bottle x 2 per day or more, 16 oz coffee, occasional hot tea at night  Protein supplement: not recently  Estimated protein intake per day: 60-80g  Estimated fluid intake per day: 60+oz water, 16oz coffee  Meals eaten away from home: rare  Typical meal pattern: 3 meals per day and 1 snacks per day  Eating Behaviors: Frequent snacking/ grazing and Mindless eating  Food allergies or intolerances: dislikes seafood  Allergies   Allergen Reactions   • Nsaids GI Intolerance     Cultural or Worship considerations: none noted    Physical Assessment  Physical Activity  Types of exercise: None  Current physical limitations: none noted    Psychosocial Assessment   Support: spouse, drt 13 yrs old, and two sisters  Living situation: , drt and one sister  Work: Full time  [employer]     Nutrition Diagnosis  Diagnosis: Overweight / Obesity (NC-3 3), Excessive energy intake (NI-1 5) and Altered GI function (NC-1 4)  Related to: Physical inactivity and Altered GI function  As Evidenced by: BMI >25, Excessive energy intake and Unintentional weight gain     Nutrition Prescription: Recommend the following diet  Low fat, Low sugar, High protein and Regular    Interventions and Teaching   Discussed pre-op and post-op nutrition guidelines  Patient educated and handouts provided  Surgical changes to stomach / GI  Capacity of post-surgery stomach  Diet progression  Adequate hydration  Sugar and fat restriction to decrease "dumping syndrome"  Expected weight loss  Weight loss plateaus/ possibility of weight regain  Exercise  Suggestions for pre-op diet  Nutrition considerations after surgery  Protein supplements  Meal planning and preparation  Appropriate carbohydrate, protein, and fat intake, and food/fluid choices to maximize safe weight loss, nutrient intake, and tolerance   Dietary and lifestyle changes  Possible problems with poor eating habits  Techniques for self monitoring and keeping daily food journal  Potential for food intolerance after surgery, and ways to deal with them including: lactose intolerance, nausea, reflux, vomiting, diarrhea, food intolerance, appetite changes, gas  Vitamin / Mineral supplementation:  Pt is currently taking:  Bariatric multi capsule once daily  Calcium soft chew OTC twice daily  OTC Zinc  Pt has finished her twice weekly loading dose Vitamin D    CBC, Iron panel, Vit B12 checked 10/6/2022  Full annual vitamin/mineral bloodwork last done 2/11/2022  Following with Heme/Onc for anemia, s/p Venofer infusions  Lipids+TSH done 2/11/22- will be updated this February  Pt has annual F/U scheduled for 2/16/23      Patient is not currently pregnant and doesn't desire to become pregnant a minimum of one year post-op    Education provided to: patient    Barriers to learning: No barriers identified    Readiness to change: relapsing    Prior research on procedure: discussed with provider and previous wt  loss surgery    Comprehension: needs reinforcement and verbalizes understanding     Expected Compliance: poor    Recommendations  Pt is an appropriate candidate for surgery   Yes  5% wt loss=11 5#=Goal of 218 5lbs    Evaluation / Monitoring  Dietitian to Monitor: Eating pattern as discussed Tolerance of nutrition prescription Body weight Lab values Physical activity Bowel pattern    Goals  Food journal, Exercise 30 minutes 5 times per week, Eat 3 meals per day and Eliminate mindless snacking    Time Spent:   1 Hour

## 2022-12-21 ENCOUNTER — OFFICE VISIT (OUTPATIENT)
Dept: BARIATRICS | Facility: CLINIC | Age: 47
End: 2022-12-21

## 2022-12-21 VITALS — WEIGHT: 230 LBS | HEIGHT: 63 IN | BODY MASS INDEX: 40.75 KG/M2

## 2022-12-21 DIAGNOSIS — K91.2 POSTSURGICAL MALABSORPTION: Primary | Chronic | ICD-10-CM

## 2022-12-21 DIAGNOSIS — E66.01 MORBID OBESITY (HCC): Primary | ICD-10-CM

## 2022-12-21 DIAGNOSIS — Z98.84 BARIATRIC SURGERY STATUS: Chronic | ICD-10-CM

## 2022-12-21 DIAGNOSIS — K21.9 GASTROESOPHAGEAL REFLUX DISEASE WITHOUT ESOPHAGITIS: ICD-10-CM

## 2022-12-21 DIAGNOSIS — E66.01 MORBID (SEVERE) OBESITY DUE TO EXCESS CALORIES (HCC): ICD-10-CM

## 2023-01-27 ENCOUNTER — ANNUAL EXAM (OUTPATIENT)
Dept: GYNECOLOGY | Facility: CLINIC | Age: 48
End: 2023-01-27

## 2023-01-27 VITALS
SYSTOLIC BLOOD PRESSURE: 120 MMHG | HEIGHT: 62 IN | DIASTOLIC BLOOD PRESSURE: 82 MMHG | BODY MASS INDEX: 42.33 KG/M2 | WEIGHT: 230 LBS

## 2023-01-27 DIAGNOSIS — Z01.419 ENCOUNTER FOR WELL WOMAN EXAM: Primary | ICD-10-CM

## 2023-01-27 DIAGNOSIS — Z12.31 SCREENING MAMMOGRAM FOR BREAST CANCER: ICD-10-CM

## 2023-01-27 DIAGNOSIS — Z12.39 ENCOUNTER FOR SCREENING BREAST EXAMINATION: ICD-10-CM

## 2023-01-27 NOTE — PROGRESS NOTES
Assessment/Plan:    No problem-specific Assessment & Plan notes found for this encounter  Diagnoses and all orders for this visit:    Encounter for well woman exam    Encounter for screening breast examination    Screening mammogram for breast cancer  -     Mammo screening bilateral w 3d & cad; Future          Subjective:      Patient ID: Kai High is a 41 y o  female  Pt presents for her annual exam today--  She has no complaints  She has mostly regular bleeding  no pelvic pain  Bowel and bladder are regular  No breast concerns today  Last mammo--12/2021    No pap today  rx mammo  Daily mvi      The following portions of the patient's history were reviewed and updated as appropriate: allergies, current medications, past family history, past medical history, past social history, past surgical history and problem list     Review of Systems   Constitutional: Negative for chills, fever and unexpected weight change  Gastrointestinal: Negative for abdominal pain, blood in stool, constipation and diarrhea  Genitourinary: Negative  Objective:      /82   Ht 5' 2" (1 575 m)   Wt 104 kg (230 lb)   BMI 42 07 kg/m²          Physical Exam  Vitals and nursing note reviewed  Constitutional:       Appearance: She is well-developed  HENT:      Head: Normocephalic and atraumatic  Chest:   Breasts:     Right: No inverted nipple, mass, nipple discharge or skin change  Left: No inverted nipple, mass, nipple discharge or skin change  Abdominal:      Palpations: Abdomen is soft  Genitourinary:     Exam position: Supine  Labia:         Right: No rash, tenderness or lesion  Left: No rash, tenderness or lesion  Vagina: Normal       Cervix: No cervical motion tenderness, discharge or friability  Adnexa:         Right: No mass, tenderness or fullness  Left: No mass, tenderness or fullness  Musculoskeletal:      Cervical back: Normal range of motion  Lymphadenopathy:      Lower Body: No right inguinal adenopathy  No left inguinal adenopathy

## 2023-02-16 ENCOUNTER — OFFICE VISIT (OUTPATIENT)
Dept: BARIATRICS | Facility: CLINIC | Age: 48
End: 2023-02-16

## 2023-02-16 VITALS
WEIGHT: 229 LBS | TEMPERATURE: 97.9 F | BODY MASS INDEX: 40.57 KG/M2 | HEART RATE: 63 BPM | HEIGHT: 63 IN | SYSTOLIC BLOOD PRESSURE: 126 MMHG | DIASTOLIC BLOOD PRESSURE: 80 MMHG

## 2023-02-16 DIAGNOSIS — Z48.815 ENCOUNTER FOR SURGICAL AFTERCARE FOLLOWING SURGERY OF DIGESTIVE SYSTEM: Primary | ICD-10-CM

## 2023-02-16 DIAGNOSIS — D50.9 IRON DEFICIENCY ANEMIA: ICD-10-CM

## 2023-02-16 DIAGNOSIS — Z98.84 BARIATRIC SURGERY STATUS: ICD-10-CM

## 2023-02-16 DIAGNOSIS — K91.2 POSTSURGICAL MALABSORPTION: ICD-10-CM

## 2023-02-16 DIAGNOSIS — K21.9 GASTROESOPHAGEAL REFLUX DISEASE WITHOUT ESOPHAGITIS: ICD-10-CM

## 2023-02-16 DIAGNOSIS — Z12.11 COLON CANCER SCREENING: ICD-10-CM

## 2023-02-16 DIAGNOSIS — E66.01 OBESITY, CLASS III, BMI 40-49.9 (MORBID OBESITY) (HCC): ICD-10-CM

## 2023-02-16 NOTE — PROGRESS NOTES
Assessment/Plan:     Patient ID: Kj Carranza is a 41 y o  female  Bariatric Surgery Status/GERD    s/p Vertical Sleeve Gastrectomy with Dr Claudetta Harvard on 05/08/2018  Presents to the office today for an annual  She did have issues with heartburn but is currently now on omeprazole which has been effective  Overall doing fair, having weight regain, interested in medical weight management and would like to hold off on surgical revision at this time (Dr Claudetta Harvard had recommended ISHMAEL however she would like to hold off on surgical interventions)  Overall doing well, tolerating a regular diet  Denies having associated nausea, vomiting, constipation, diarrhea, abdominal pain, changes in her bowels  Taking her MVI daily  PLAN:     - Refer to [facility] for furthering weight loss  - Routine follow up in 1 year for annual visit  - Continue with healthy lifestyle, adequate protein intake of 60 gm, fluid intake of at least 64 oz  - Continue with MVI daily  - Activity as tolerated  - Labs ordered and will adjust accordingly if any deficiency  - Follow up with RD and SW as needed  Iron deficiency anemia - noted in her annual bariatric labs, eventually she saw hematology and was provided with iron infusions  She is currently not on any maintenance therapy plan and does not follow up with hematology anymore  She currently denies associated symptoms  - Will check labs again  Refer to hematology as needed       · Continued/Maintain healthy weight loss with good nutrition intakes  · Adequate hydration with at least 64oz  fluid intake  · Follow diet as discussed  · Follow vitamin and mineral recommendations as reviewed with you  · Exercise as tolerated  · Colonoscopy referral made: referral to GI made  · Mammogram - ordered  · Sleeve screening UTD     · Follow-up in 1 year for annual visit   We kindly ask that your arrive 15 minutes before your scheduled appointment time with your provider to allow our staff to room you, get your vital signs and update your chart  · Get lab work done prior to annual visit  Please call the office if you need a script  It is recommended to check with your insurance BEFORE getting labs done to make sure they are covered by your policy  · Call our office if you have any problems with abdominal pain especially associated with fever, chills, nausea, vomiting or any other concerns  · All  Post-bariatric surgery patients should be aware that very small quantities of any alcohol can cause impairment and it is very possible not to feel the effect  The effect can be in the system for several hours  It is also a stomach irritant  · It is advised to AVOID alcohol, Nonsteroidal antiinflammatory drugs (NSAIDS) and nicotine of all forms   Any of these can cause stomach irritation/pain  · Discussed the effects of alcohol on a bariatric patient and the increased impairment risk  · Keep up the good work! Postsurgical Malabsorption   -At risk for malabsorption of vitamins/minerals secondary to malabsorption and restriction of intake from bariatric surgery  -Currently taking adequate postop bariatric surgery vitamin supplementation  -Last set of bariatric labs completed on 02/2022 and showed low iron deficiency anemia, low vitamin D, high PTH - advised to add vitamin D3  -Next set of bariatric labs ordered for approximately 2 weeks  -Patient received education about the importance of adhering to a lifelong supplementation regimen to avoid vitamin/mineral deficiencies      Diagnoses and all orders for this visit:    Encounter for surgical aftercare following surgery of digestive system  -     Zinc; Future  -     Vitamin D 25 hydroxy; Future  -     Vitamin B12; Future  -     Vitamin B1, whole blood; Future  -     Vitamin A; Future  -     PTH, intact; Future  -     CBC and Platelet; Future  -     Comprehensive metabolic panel; Future  -     Ferritin;  Future  -     Folate; Future  -     Iron Saturation %; Future    Bariatric surgery status  -     Zinc; Future  -     Vitamin D 25 hydroxy; Future  -     Vitamin B12; Future  -     Vitamin B1, whole blood; Future  -     Vitamin A; Future  -     PTH, intact; Future  -     CBC and Platelet; Future  -     Comprehensive metabolic panel; Future  -     Ferritin; Future  -     Folate; Future  -     Iron Saturation %; Future  -     Ambulatory referral to Gastroenterology; Future    Postsurgical malabsorption  -     Zinc; Future  -     Vitamin D 25 hydroxy; Future  -     Vitamin B12; Future  -     Vitamin B1, whole blood; Future  -     Vitamin A; Future  -     PTH, intact; Future  -     CBC and Platelet; Future  -     Comprehensive metabolic panel; Future  -     Ferritin; Future  -     Folate; Future  -     Iron Saturation %; Future    Obesity, Class III, BMI 40-49 9 (morbid obesity) (HCC)  -     Zinc; Future  -     Vitamin D 25 hydroxy; Future  -     Vitamin B12; Future  -     Vitamin B1, whole blood; Future  -     Vitamin A; Future  -     PTH, intact; Future  -     CBC and Platelet; Future  -     Comprehensive metabolic panel; Future  -     Ferritin; Future  -     Folate; Future  -     Iron Saturation %; Future    Iron deficiency anemia  -     Vitamin B12; Future  -     Ferritin; Future  -     Folate; Future  -     Iron Saturation %; Future    Colon cancer screening  -     Ambulatory referral to Gastroenterology; Future         Subjective:      Patient ID: Zahida Tan is a 41 y o  female  s/p Vertical Sleeve Gastrectomy with Dr Thad Chappell on 05/08/2018  Presents to the office today for an annual  She did have issues with heartburn but is currently now on omeprazole which has been effective  Overall doing fair, having weight regain, interested in medical weight management and would like to hold off on surgical revision at this time  Overall doing well, tolerating a regular diet   Denies having associated nausea, vomiting, constipation, diarrhea, abdominal pain, changes in her bowels  Taking her MVI daily  Initial: 246 5 lbs  Current: 229 lbs  EWL: (Weight loss is ahead of schedule at this post surgical period )  Gary: 169 5 lbs  Current BMI is Body mass index is 40 57 kg/m²  · Tolerating a regular diet-yes  · Eating at least 60 grams of protein per day-yes for the most part but recently switched shifts so having trouble adding in her proteins  · Following 30/60 minute rule with liquids-yes - does 30/30  · Drinking at least 64 ounces of fluid per day-yes for the most part  · Drinking carbonated beverages-no  · Sufficient exercise-yes - walks 3-4 times per weeks  · Using NSAIDs regularly-no  · Using nicotine-no  · Using alcohol-no  · Supplements: Multivitamins and Calcium     · EWL is 17%, which places the patient ahead of schedule for expected post surgical weight loss at this time  The following portions of the patient's history were reviewed and updated as appropriate: allergies, current medications, past family history, past medical history, past social history, past surgical history and problem list     Review of Systems   Constitutional: Negative  Respiratory: Negative  Cardiovascular: Negative  Gastrointestinal: Positive for constipation (occasionally )  Musculoskeletal: Positive for arthralgias (at times) and back pain  Neurological: Negative  Psychiatric/Behavioral: Negative  Objective:    /80   Pulse 63   Temp 97 9 °F (36 6 °C) (Tympanic)   Ht 5' 3" (1 6 m)   Wt 104 kg (229 lb)   BMI 40 57 kg/m²      Physical Exam  Vitals and nursing note reviewed  Constitutional:       Appearance: Normal appearance  She is obese  Cardiovascular:      Rate and Rhythm: Normal rate and regular rhythm  Pulses: Normal pulses  Heart sounds: Normal heart sounds  Pulmonary:      Effort: Pulmonary effort is normal       Breath sounds: Normal breath sounds     Abdominal:      General: Bowel sounds are normal       Palpations: Abdomen is soft  Tenderness: There is no abdominal tenderness  Musculoskeletal:         General: Normal range of motion  Skin:     General: Skin is warm and dry  Neurological:      General: No focal deficit present  Mental Status: She is alert and oriented to person, place, and time  Psychiatric:         Mood and Affect: Mood normal          Behavior: Behavior normal          Thought Content:  Thought content normal          Judgment: Judgment normal

## 2023-02-21 ENCOUNTER — OFFICE VISIT (OUTPATIENT)
Dept: BARIATRICS | Facility: CLINIC | Age: 48
End: 2023-02-21

## 2023-02-21 VITALS
BODY MASS INDEX: 40.18 KG/M2 | WEIGHT: 226.8 LBS | SYSTOLIC BLOOD PRESSURE: 118 MMHG | DIASTOLIC BLOOD PRESSURE: 72 MMHG | HEIGHT: 63 IN | HEART RATE: 72 BPM | RESPIRATION RATE: 16 BRPM

## 2023-02-21 DIAGNOSIS — E66.01 OBESITY, CLASS III, BMI 40-49.9 (MORBID OBESITY) (HCC): Primary | ICD-10-CM

## 2023-02-21 DIAGNOSIS — K91.2 POSTSURGICAL MALABSORPTION: Chronic | ICD-10-CM

## 2023-02-21 DIAGNOSIS — Z98.84 BARIATRIC SURGERY STATUS: Chronic | ICD-10-CM

## 2023-02-21 NOTE — PATIENT INSTRUCTIONS
Goals:  Do not skip meals  Food log (ie ) www myfitnesspal com,sparkpeople  com,loseit com,calorieking  com,etc  baritastic (use skinnytaste  com, dietdoctor  com or smartphone nikos [employer] for recipes)  No sugary beverages  At least 64oz of water daily  Increase physical activity by 10 minutes daily   Gradually increase physical activity to a goal of 5 days per week for 30 minutes of MODERATE intensity PLUS 2 days per week of FULL BODY resistance training (use smartphone apps [employer], Home Workout, etc )

## 2023-02-21 NOTE — ASSESSMENT & PLAN NOTE
Hong Johnson 05/08/2018   - Discussed options of HealthyCORE-Intensive Lifestyle Intervention Program, Very Low Calorie Diet-VLCD and Conservative Program and the role of weight loss medications   - Not currently interested in revision surgery, wants to try medical weight management first    - Explained the importance of making lifestyle changes first before starting anti-obesity medications  - Patient should demonstrate lifestyle changes first before anti-obesity medication initiated  - Patient is interested in pursuing Conservative Program  - Initial weight loss goal of 5-10% weight loss for improved health  - Weight loss can improve patient's co-morbid conditions and/or prevent weight-related complications  - Denies history of seizures, kidney stones, or glaucoma  - Patient denies personal history of pancreatitis  Patient also denies personal and family history of thyroid cancer and multiple endocrine neoplasia type 2 (MEN 2 tumor)  - Stop Bang 1/8  - Labs reviewed: CBC 10/6/2022 was within normal limits  - Check CMP, A1C, fasting insulin, TSH, and lipid panel  Goals:  Do not skip meals  Food log (ie ) www myfitnesspal com,sparkpeople  com,loseit com,calorieking  com,etc  baritastic (use skinnytaste  com, dietdoctor  com or smartphone nikos [employer] for recipes)  No sugary beverages  At least 64oz of water daily  Increase physical activity by 10 minutes daily  Gradually increase physical activity to a goal of 5 days per week for 30 minutes of MODERATE intensity PLUS 2 days per week of FULL BODY resistance training (use smartphone apps [employer], Home Workout, etc )  Start food logging, weighing and measuring food  1300 calories per day  Sample menu given  Keep up the great work with water intake  Follow 30-60 rule  Keep up the great work with exercise, try to get 5 days of walking per week and add in 2 days of resistance training

## 2023-02-21 NOTE — PROGRESS NOTES
Assessment/Plan:    Obesity, Class III, BMI 40-49 9 (morbid obesity) (HCC)  Kelly Napier 05/08/2018   - Discussed options of HealthyCORE-Intensive Lifestyle Intervention Program, Very Low Calorie Diet-VLCD and Conservative Program and the role of weight loss medications   - Not currently interested in revision surgery, wants to try medical weight management first    - Explained the importance of making lifestyle changes first before starting anti-obesity medications  - Patient should demonstrate lifestyle changes first before anti-obesity medication initiated  - Patient is interested in pursuing Conservative Program  - Initial weight loss goal of 5-10% weight loss for improved health  - Weight loss can improve patient's co-morbid conditions and/or prevent weight-related complications  - Denies history of seizures, kidney stones, or glaucoma  - Patient denies personal history of pancreatitis  Patient also denies personal and family history of thyroid cancer and multiple endocrine neoplasia type 2 (MEN 2 tumor)  - Stop Bang 1/8  - Labs reviewed: CBC 10/6/2022 was within normal limits  - Check CMP, A1C, fasting insulin, TSH, and lipid panel  Goals:  Do not skip meals  Food log (ie ) www myfitnesspal com,sparkpeople  com,loseit com,calorieking  com,etc  baritastic (use skinnytaste  com, dietdoctor  com or smartphone nikos [employer] for recipes)  No sugary beverages  At least 64oz of water daily  Increase physical activity by 10 minutes daily  Gradually increase physical activity to a goal of 5 days per week for 30 minutes of MODERATE intensity PLUS 2 days per week of FULL BODY resistance training (use smartphone apps [employer], Home Workout, etc )  Start food logging, weighing and measuring food  1300 calories per day  Sample menu given  Keep up the great work with water intake  Follow 30-60 rule     Keep up the great work with exercise, try to get 5 days of walking per week and add in 2 days of resistance training  Bariatric surgery status  s/p Vertical Sleeve Gastrectomy with Dr Norm Long 05/08/2018    Postsurgical malabsorption  - Scheduled for annual with surgical GIO in February 2024  Nikhil ixong was seen today for consult  Diagnoses and all orders for this visit:    Obesity, Class III, BMI 40-49 9 (morbid obesity) (HCC)  -     HEMOGLOBIN A1C W/ EAG ESTIMATION; Future  -     Insulin, fasting; Future  -     TSH, 3rd generation with Free T4 reflex; Future  -     Lipid Panel with Direct LDL reflex; Future    Bariatric surgery status    Postsurgical malabsorption                Follow up in approximately 3 months with Non-Surgical Physician/Advanced Practitioner  Subjective:   Chief Complaint   Patient presents with   • Consult     [facility] consult post-op wt gain; waist 45in; goal wt 180; stop bang 1-8       Patient ID: Jo Ann Pelletier  is a 41 y o  female with excess weight/obesity here to pursue weight management  Previous notes and records have been reviewed  Past Medical History:   Diagnosis Date   • Anemia    • Bariatric surgery status    • CPAP (continuous positive airway pressure) dependence    • Depression    • History of anxiety    • Left knee pain     "occas"   • Low back pain     "mayra if standing for a while"   • Morbid obesity (HCC)    • Motion sickness    • Obesity    • PONV (postoperative nausea and vomiting)    • Postgastrectomy malabsorption    • Prediabetes    • Right ankle pain     " occas"   • Shortness of breath     "exertional"   • Sleep apnea     Mild   • Walks frequently     for exercise   • Wears glasses      Past Surgical History:   Procedure Laterality Date   • ANKLE SURGERY Bilateral     Right with screws and plate implant   Left ACL repair Resolved: 1997   • ANTERIOR CRUCIATE LIGAMENT REPAIR Left     screws implanted   • BARIATRIC SURGERY  05/08/2018   • KNEE CARTILAGE SURGERY      right   • KNEE SURGERY Left    • NC EGD TRANSORAL BIOPSY SINGLE/MULTIPLE N/A 3/21/2018    Procedure: ESOPHAGOGASTRODUODENOSCOPY (EGD) with bx;  Surgeon: Ruthie Dennis MD;  Location: [facility]; Service: Bariatrics   • PA LAPS [place] 57 LONGITUDINAL GASTRECTOMY N/A 2018    Procedure: LAPAROSCOPIC SLEEVE GASTRECTOMY WITH ROBOTICS;  Surgeon: Ruthie Dennis MD;  Location: [facility];  Service: Bariatrics   • REDUCTION MAMMAPLASTY Bilateral     Resolved:    • WISDOM TOOTH EXTRACTION         HPI:  Wt Readings from Last 20 Encounters:   23 103 kg (226 lb 12 8 oz)   23 104 kg (229 lb)   23 104 kg (230 lb)   22 104 kg (230 lb)   22 102 kg (225 lb)   22 102 kg (225 lb)   22 100 kg (221 lb)   22 100 kg (221 lb)   22 98 kg (216 lb)   22 98 kg (216 lb 0 8 oz)   22 99 8 kg (220 lb)   02/10/22 97 5 kg (215 lb)   22 100 kg (221 lb)   22 97 5 kg (215 lb)   21 97 5 kg (215 lb)   21 97 5 kg (215 lb)   21 97 5 kg (215 lb)   21 96 7 kg (213 lb 3 2 oz)   21 97 1 kg (214 lb)   20 89 kg (196 lb 3 2 oz)     s/p Vertical Felipe Sing Dr Megan Matson 2018  Weight prior to surgery was 246 5 lbs, braydon about 178 lbs  Has been having weight regain  Referred for [facility]  Tries to follow 30 rule  Intermittently food logging  Sometimes eats larger portions when feeling very hungry  Was on Wellbutrin in the past for depression  No difference with appetite        Hydration: 60-90 oz water, 1 cup coffee with cream and sweet and low, occ crystal light at work, occ iced tea unsweetened    Alcohol: none  Smoking: denies  Exercise: 1 5-3 miles walking 4-5 times per week  Occupation: nightshift   Sleep: 5-7 hours  STOP ban/8    Current weight: 226 8 lbs BMI 40 18  Goal weight: 180 lbs    Colonoscopy: due, has referral, encouraged to schedule   Mammogram: due, has referral, encouraged to schedule    The following portions of the patient's history were reviewed and updated as appropriate: allergies, current medications, past family history, past medical history, past social history, past surgical history, and problem list     Family History   Problem Relation Age of Onset   • Heart disease Mother    • Hypertension Mother    • Heart attack Mother    • Diabetes type II Mother         Mellitus   • Hypertension Father    • Diabetes type II Father         Mellitus   • No Known Problems Sister    • No Known Problems Sister    • No Known Problems Paternal Aunt    • No Known Problems Maternal Grandmother    • No Known Problems Maternal Grandfather    • No Known Problems Paternal Grandmother    • No Known Problems Paternal Grandfather    • Cancer Neg Hx    • Stroke Neg Hx    • Thyroid disease Neg Hx         Review of Systems   HENT: Negative for sore throat  Respiratory: Negative for cough and shortness of breath  Cardiovascular: Negative for chest pain and palpitations  Gastrointestinal: Positive for constipation (intermittent)  Negative for abdominal pain, diarrhea, nausea and vomiting         + GERD controlled with medication   Musculoskeletal: Negative for arthralgias and back pain  Skin: Negative for rash  Psychiatric/Behavioral: Negative for suicidal ideas (or HI)  Denies depression and anxiety       Objective:  /72   Pulse 72   Resp 16   Ht 5' 3" (1 6 m)   Wt 103 kg (226 lb 12 8 oz)   BMI 40 18 kg/m²     Physical Exam  Vitals and nursing note reviewed  Constitutional   General appearance: Abnormal   well developed and morbidly obese  Eyes No conjunctival injection  Ears, Nose, Mouth, and Throat Oral mucosa moist    Pulmonary   Respiratory effort: No increased work of breathing or signs of respiratory distress  Cardiovascular    Examination of extremities for edema and/or varicosities: Normal   no edema  Abdomen   Abdomen: Abnormal   The abdomen was obese      Musculoskeletal   Normal range of motion  Neurological   Gait and station: Normal    Psychiatric   Orientation to person, place and time: Normal     Affect: appropriate

## 2023-02-25 ENCOUNTER — APPOINTMENT (OUTPATIENT)
Dept: LAB | Age: 48
End: 2023-02-25

## 2023-02-25 DIAGNOSIS — Z98.84 BARIATRIC SURGERY STATUS: ICD-10-CM

## 2023-02-25 DIAGNOSIS — D50.9 IRON DEFICIENCY ANEMIA: ICD-10-CM

## 2023-02-25 DIAGNOSIS — E66.01 OBESITY, CLASS III, BMI 40-49.9 (MORBID OBESITY) (HCC): ICD-10-CM

## 2023-02-25 DIAGNOSIS — K91.2 POSTSURGICAL MALABSORPTION: ICD-10-CM

## 2023-02-25 DIAGNOSIS — Z48.815 ENCOUNTER FOR SURGICAL AFTERCARE FOLLOWING SURGERY OF DIGESTIVE SYSTEM: ICD-10-CM

## 2023-02-25 LAB
25(OH)D3 SERPL-MCNC: 21.3 NG/ML (ref 30–100)
ALBUMIN SERPL BCP-MCNC: 4 G/DL (ref 3.5–5)
ALP SERPL-CCNC: 60 U/L (ref 46–116)
ALT SERPL W P-5'-P-CCNC: 27 U/L (ref 12–78)
ANION GAP SERPL CALCULATED.3IONS-SCNC: 2 MMOL/L (ref 4–13)
AST SERPL W P-5'-P-CCNC: 16 U/L (ref 5–45)
BILIRUB SERPL-MCNC: 0.82 MG/DL (ref 0.2–1)
BUN SERPL-MCNC: 15 MG/DL (ref 5–25)
CALCIUM SERPL-MCNC: 9.9 MG/DL (ref 8.3–10.1)
CHLORIDE SERPL-SCNC: 106 MMOL/L (ref 96–108)
CHOLEST SERPL-MCNC: 247 MG/DL
CO2 SERPL-SCNC: 28 MMOL/L (ref 21–32)
CREAT SERPL-MCNC: 0.8 MG/DL (ref 0.6–1.3)
ERYTHROCYTE [DISTWIDTH] IN BLOOD BY AUTOMATED COUNT: 12.7 % (ref 11.6–15.1)
EST. AVERAGE GLUCOSE BLD GHB EST-MCNC: 108 MG/DL
FERRITIN SERPL-MCNC: 155 NG/ML (ref 8–388)
FOLATE SERPL-MCNC: 10 NG/ML (ref 3.1–17.5)
GFR SERPL CREATININE-BSD FRML MDRD: 89 ML/MIN/1.73SQ M
GLUCOSE P FAST SERPL-MCNC: 88 MG/DL (ref 65–99)
HBA1C MFR BLD: 5.4 %
HCT VFR BLD AUTO: 42.8 % (ref 34.8–46.1)
HDLC SERPL-MCNC: 51 MG/DL
HGB BLD-MCNC: 14.1 G/DL (ref 11.5–15.4)
INSULIN SERPL-ACNC: 12 MU/L (ref 3–25)
IRON SATN MFR SERPL: 25 % (ref 15–50)
IRON SERPL-MCNC: 88 UG/DL (ref 50–170)
LDLC SERPL CALC-MCNC: 177 MG/DL (ref 0–100)
MCH RBC QN AUTO: 29.4 PG (ref 26.8–34.3)
MCHC RBC AUTO-ENTMCNC: 32.9 G/DL (ref 31.4–37.4)
MCV RBC AUTO: 89 FL (ref 82–98)
PLATELET # BLD AUTO: 319 THOUSANDS/UL (ref 149–390)
PMV BLD AUTO: 10.7 FL (ref 8.9–12.7)
POTASSIUM SERPL-SCNC: 4.4 MMOL/L (ref 3.5–5.3)
PROT SERPL-MCNC: 7.2 G/DL (ref 6.4–8.4)
PTH-INTACT SERPL-MCNC: 76.8 PG/ML (ref 18.4–80.1)
RBC # BLD AUTO: 4.8 MILLION/UL (ref 3.81–5.12)
SODIUM SERPL-SCNC: 136 MMOL/L (ref 135–147)
TIBC SERPL-MCNC: 358 UG/DL (ref 250–450)
TRIGL SERPL-MCNC: 96 MG/DL
TSH SERPL DL<=0.05 MIU/L-ACNC: 2.4 UIU/ML (ref 0.45–4.5)
VIT B12 SERPL-MCNC: 463 PG/ML (ref 100–900)
WBC # BLD AUTO: 6.77 THOUSAND/UL (ref 4.31–10.16)

## 2023-02-28 ENCOUNTER — TELEPHONE (OUTPATIENT)
Dept: BARIATRICS | Facility: CLINIC | Age: 48
End: 2023-02-28

## 2023-03-01 LAB
VIT B1 BLD-SCNC: 124.4 NMOL/L (ref 66.5–200)
ZINC SERPL-MCNC: 68 UG/DL (ref 44–115)

## 2023-03-07 LAB — VIT A SERPL-MCNC: 26.5 UG/DL (ref 20.1–62)

## 2023-03-08 DIAGNOSIS — Z98.84 BARIATRIC SURGERY STATUS: Primary | ICD-10-CM

## 2023-03-08 DIAGNOSIS — E55.9 VITAMIN D DEFICIENCY: ICD-10-CM

## 2023-05-23 ENCOUNTER — OFFICE VISIT (OUTPATIENT)
Dept: BARIATRICS | Facility: CLINIC | Age: 48
End: 2023-05-23

## 2023-05-23 ENCOUNTER — TELEPHONE (OUTPATIENT)
Dept: BARIATRICS | Facility: CLINIC | Age: 48
End: 2023-05-23

## 2023-05-23 VITALS
WEIGHT: 227.4 LBS | SYSTOLIC BLOOD PRESSURE: 110 MMHG | HEART RATE: 62 BPM | RESPIRATION RATE: 16 BRPM | DIASTOLIC BLOOD PRESSURE: 80 MMHG | HEIGHT: 62 IN | BODY MASS INDEX: 41.85 KG/M2

## 2023-05-23 DIAGNOSIS — K21.9 GASTROESOPHAGEAL REFLUX DISEASE WITHOUT ESOPHAGITIS: ICD-10-CM

## 2023-05-23 DIAGNOSIS — K91.2 POSTSURGICAL MALABSORPTION: Chronic | ICD-10-CM

## 2023-05-23 DIAGNOSIS — E66.01 OBESITY, CLASS III, BMI 40-49.9 (MORBID OBESITY) (HCC): Primary | ICD-10-CM

## 2023-05-23 DIAGNOSIS — Z98.84 BARIATRIC SURGERY STATUS: Chronic | ICD-10-CM

## 2023-05-23 NOTE — PROGRESS NOTES
Assessment/Plan:     Obesity, Class III, BMI 40-49 9 (morbid obesity) [facility])  Santy Mcclelland 05/08/2018  - Patient is pursuing Conservative Program   - Not currently interested in revision surgery, wants to try medical weight management first    - Initial weight loss goal of 5-10% weight loss for improved health  - Denies history of seizures, kidney stones, or glaucoma  - Patient denies personal history of pancreatitis  Patient also denies personal and family history of thyroid cancer and multiple endocrine neoplasia type 2 (MEN 2 tumor)  - Interested in starting weight loss medication to help with appetite and cravings  - discussed Wegovy, Saxenda, Qsymia, Contrave, and phentermine  Reviewed the [facility]  She made an informed decision to start [address]  Side effects of Saxenda discussed  - [address] started at weight of 227 4 lbs  - medication agreement signed  - Advised to use some form of birth control while on weight loss medication    - Labs reviewed: CBC, CMP, A1c, fasting insulin, TSH, and lipid panel completed February 25, 2023  Cholesterol and LDL elevated, which will likely improve with weight loss  Remainder of the blood work was within acceptable range  Initial: 226 8 lbs BMI 40 18  Current: 227 4 lbs BMI 41 59  Change: +0 6 lbs  Goal: 180 Lbs    Goals:  Do not skip meals  Continue to supplement with protein shakes if needed  Try to food log consistently  1300 calories per day  Keep up the great work with water intake  Follow 30/60 rule  Keep up the great work with exercise  Start Saxenda         Bariatric surgery status  s/p Vertical Bertrum Qualia Dr Garima Mcclelland 05/08/2018    Postsurgical malabsorption  - Scheduled for annual with surgical GIO in February 2024  Gastroesophageal reflux disease without esophagitis  - Taking omeprazole  May improve with weight loss and lifestyle modification   Continue management with prescribing provider  Awilda Flores was seen today for follow-up  Diagnoses and all orders for this visit:    Obesity, Class III, BMI 40-49 9 (morbid obesity) (HCC)  -     liraglutide (SAXENDA) injection; Inject 0 6 mg subcutaneously once per day for the first week  Increase in weekly intervals by 0 6 mg until a dose of 3 mg is reached  -     Insulin Pen Needle 32G X 4 MM MISC; Use in the morning    Gastroesophageal reflux disease without esophagitis  -     liraglutide (SAXENDA) injection; Inject 0 6 mg subcutaneously once per day for the first week  Increase in weekly intervals by 0 6 mg until a dose of 3 mg is reached  -     Insulin Pen Needle 32G X 4 MM MISC; Use in the morning    Bariatric surgery status    Postsurgical malabsorption            Follow up in approximately 8 weeks for nurse visit to check HR, BP, and weight and 3 months with Non-Surgical Physician/Advanced Practitioner  Subjective:   Chief Complaint   Patient presents with   • Follow-up     MWM 3mth f/u;Waist-44in       Patient ID: Johnathan Angeles  is a 41 y o  female with excess weight/obesity here to pursue weight management  Patient is pursuing Conservative Program    Most recent notes and records were reviewed  HPI    Wt Readings from Last 10 Encounters:   05/23/23 103 kg (227 lb 6 4 oz)   02/21/23 103 kg (226 lb 12 8 oz)   02/16/23 104 kg (229 lb)   01/27/23 104 kg (230 lb)   12/21/22 104 kg (230 lb)   11/26/22 102 kg (225 lb)   11/04/22 102 kg (225 lb)   09/14/22 100 kg (221 lb)   06/16/22 100 kg (221 lb)   04/22/22 98 kg (216 lb)       S/P Vertical Deandra Thomas 05/08/2018  Weight prior to surgery was 246 5 lbs, braydon about 178 lbs  Has been having weight regain and following with MWM for assistance with that  Not currently interested in revision surgery  Food logs intermittently  Gets around 9977-7856 calories per day   Hungry a lot and having cravings for sweets and salty       Follows 30/30 rule  Was on Wellbutrin in the past for depression  No difference with appetite  Currently working nightshift and often works overtime  After work goes right to bed, does not eat then until 1-2 pm gets up and has protein shake or eggs or overnight oats  Has dinner in the evening chicken or pork or hamburger and veggies and starch  Snacks in the evening candy or puja crackers  At work has beef jerky and cheese     Hydration: 60-90 oz water, 1 cup coffee with cream and sweet and low, occ crystal light at work, occ iced tea unsweetened    Alcohol: none  Smoking: denies  Exercise: 1 5-3 miles walking 4-5 times per week  Occupation: nightshift   Sleep: 5-7 hours      Colonoscopy: due, has referral, encouraged to schedule   Mammogram: due, has referral, encouraged to schedule             The following portions of the patient's history were reviewed and updated as appropriate: allergies, current medications, past family history, past medical history, past social history, past surgical history, and problem list     Family History   Problem Relation Age of Onset   • Heart disease Mother    • Hypertension Mother    • Heart attack Mother    • Diabetes type II Mother         Mellitus   • Hypertension Father    • Diabetes type II Father         Mellitus   • No Known Problems Sister    • No Known Problems Sister    • No Known Problems Paternal Aunt    • No Known Problems Maternal Grandmother    • No Known Problems Maternal Grandfather    • No Known Problems Paternal Grandmother    • No Known Problems Paternal Grandfather    • Cancer Neg Hx    • Stroke Neg Hx    • Thyroid disease Neg Hx         Review of Systems   HENT: Negative for sore throat  Respiratory: Negative for cough and shortness of breath  Cardiovascular: Negative for chest pain and palpitations  Gastrointestinal: Positive for constipation (intermittent ) and diarrhea (intermittent)   Negative for abdominal pain, nausea and "vomiting         + GERD controlled with medication   Musculoskeletal: Positive for arthralgias (chronic knee)  Negative for back pain  Skin: Negative for rash  Psychiatric/Behavioral: Negative for suicidal ideas (or HI)  Denies depression and anxiety       Objective:  /80   Pulse 62   Resp 16   Ht 5' 2\" (1 575 m)   Wt 103 kg (227 lb 6 4 oz)   BMI 41 59 kg/m²     Physical Exam  Vitals and nursing note reviewed  Constitutional   General appearance: Abnormal   well developed and morbidly obese  Eyes No conjunctival injection  Ears, Nose, Mouth, and Throat Oral mucosa moist    Pulmonary   Respiratory effort: No increased work of breathing or signs of respiratory distress  Cardiovascular    Examination of extremities for edema and/or varicosities: Normal   no edema  Abdomen   Abdomen: Abnormal   The abdomen was obese      Musculoskeletal   Normal range of motion  Neurological   Gait and station: Normal    Psychiatric   Orientation to person, place and time: Normal     Affect: appropriate        "

## 2023-05-23 NOTE — ASSESSMENT & PLAN NOTE
- Taking omeprazole  May improve with weight loss and lifestyle modification  Continue management with prescribing provider

## 2023-05-23 NOTE — PATIENT INSTRUCTIONS
Common side effects of Saxenda may include: increased heart rate (pulse), headache, low blood sugar, GI/abdominal upset, heartburn, fatigue, and dizziness      VISIT SAXENDA  COM  INJECT SAXENDA DAILY SUBCUTANEOUSLY  -WEEK 1: 0 6mg daily  -if tolerated WEEK 2: 1 2mg daily  -if tolerated WEEK 3: 1 8mg daily  -if tolerated WEEK 4: 2 4mg daily  -if tolerated WEEK 5: 3mg daily  -IF NAUSEA/VOMITING DEVELOP STOP MEDICATION FOR A FEW DAYS AND DECREASE TO PREVIOUSLY TOLERATED DOSE  STAY HYDRATED  -IF YOU DEVELOP SEVERE ABDOMINAL PAIN WHICH MAY RADIATE TO THE BACK, SOMETIMES ASSOCIATED WITH FEVER, AND VOMITING, STOP MEDICATION AND SEEK URGENT CARE AS THIS MAY BE A SIGN OF PANCREATITIS  Please make sure that you are cleaning the area with alcohol wipes before each use, changing the needles before each use, and rotating the injection site  Please inject at a 90 degree angle  You can also have someone inject the [address] for you in the back of the upper arm  Please watch this link on how to use the pen        http://www [employer]/

## 2023-05-23 NOTE — TELEPHONE ENCOUNTER
Pt and Pharm was infromed of Saxenda Approval 5/23/23-9/23/23  She was informed about  4/5%  Wt lose for renewal purposes

## 2023-05-23 NOTE — ASSESSMENT & PLAN NOTE
Cipriano Luna 05/08/2018  - Patient is pursuing Conservative Program   - Not currently interested in revision surgery, wants to try medical weight management first    - Initial weight loss goal of 5-10% weight loss for improved health  - Denies history of seizures, kidney stones, or glaucoma  - Patient denies personal history of pancreatitis  Patient also denies personal and family history of thyroid cancer and multiple endocrine neoplasia type 2 (MEN 2 tumor)  - Interested in starting weight loss medication to help with appetite and cravings  - discussed Wegovy, Saxenda, Qsymia, Contrave, and phentermine  Reviewed the [facility] shortage  She made an informed decision to start [address]  Side effects of Saxenda discussed  - [address] started at weight of 227 4 lbs  - medication agreement signed  - Advised to use some form of birth control while on weight loss medication    - Labs reviewed: CBC, CMP, A1c, fasting insulin, TSH, and lipid panel completed February 25, 2023  Cholesterol and LDL elevated, which will likely improve with weight loss  Remainder of the blood work was within acceptable range  Initial: 226 8 lbs BMI 40 18  Current: 227 4 lbs BMI 41 59  Change: +0 6 lbs  Goal: 180 Lbs    Goals:  Do not skip meals  Continue to supplement with protein shakes if needed  Try to food log consistently  1300 calories per day  Keep up the great work with water intake  Follow 30/60 rule     Keep up the great work with exercise  [employer]

## 2023-07-18 ENCOUNTER — CLINICAL SUPPORT (OUTPATIENT)
Dept: BARIATRICS | Facility: CLINIC | Age: 48
End: 2023-07-18

## 2023-07-18 VITALS
HEIGHT: 62 IN | HEART RATE: 60 BPM | DIASTOLIC BLOOD PRESSURE: 80 MMHG | WEIGHT: 216 LBS | BODY MASS INDEX: 39.75 KG/M2 | RESPIRATION RATE: 16 BRPM | SYSTOLIC BLOOD PRESSURE: 126 MMHG

## 2023-07-18 DIAGNOSIS — R63.5 ABNORMAL WEIGHT GAIN: Primary | ICD-10-CM

## 2023-07-18 PROCEDURE — RECHECK

## 2023-07-18 NOTE — PROGRESS NOTES
- Is BP less than 100/60? No  - Is BP greater than 140/90? No  - Is HR greater than 100? No  **If yes to any of the above, have patient relax and repeat in 5-10 minutes**    Repeat values:    - Is BP less than 100/60?  - Is BP greater than 140/90?  - Is HR greater than 100?   **If values remain outside of ranges above, please consult provider for next steps**

## 2023-09-05 DIAGNOSIS — K21.9 GASTROESOPHAGEAL REFLUX DISEASE WITHOUT ESOPHAGITIS: ICD-10-CM

## 2023-09-05 DIAGNOSIS — E66.01 OBESITY, CLASS III, BMI 40-49.9 (MORBID OBESITY) (HCC): ICD-10-CM

## 2023-09-06 RX ORDER — LIRAGLUTIDE 6 MG/ML
INJECTION, SOLUTION SUBCUTANEOUS
Refills: 3 | OUTPATIENT
Start: 2023-09-06

## 2023-09-12 ENCOUNTER — OFFICE VISIT (OUTPATIENT)
Dept: BARIATRICS | Facility: CLINIC | Age: 48
End: 2023-09-12
Payer: COMMERCIAL

## 2023-09-12 VITALS
BODY MASS INDEX: 39.56 KG/M2 | WEIGHT: 215 LBS | HEIGHT: 62 IN | HEART RATE: 63 BPM | RESPIRATION RATE: 17 BRPM | DIASTOLIC BLOOD PRESSURE: 80 MMHG | SYSTOLIC BLOOD PRESSURE: 122 MMHG

## 2023-09-12 DIAGNOSIS — K21.9 GASTROESOPHAGEAL REFLUX DISEASE WITHOUT ESOPHAGITIS: ICD-10-CM

## 2023-09-12 DIAGNOSIS — Z98.84 BARIATRIC SURGERY STATUS: Chronic | ICD-10-CM

## 2023-09-12 DIAGNOSIS — K91.2 POSTSURGICAL MALABSORPTION: Chronic | ICD-10-CM

## 2023-09-12 DIAGNOSIS — E66.9 OBESITY, CLASS II, BMI 35-39.9: Primary | ICD-10-CM

## 2023-09-12 PROBLEM — E66.812 OBESITY, CLASS II, BMI 35-39.9: Status: ACTIVE | Noted: 2018-04-16

## 2023-09-12 PROCEDURE — 99214 OFFICE O/P EST MOD 30 MIN: CPT | Performed by: NURSE PRACTITIONER

## 2023-09-12 NOTE — ASSESSMENT & PLAN NOTE
- Shyrl Canavan Dr. Aquilla Rous 05/08/2018. - Patient is pursuing Conservative Program   - Not currently interested in revision surgery, wants to try medical weight management first.   - Initial weight loss goal of 5-10% weight loss for improved health  - Denies history of seizures, kidney stones, or glaucoma. - Patient denies personal history of pancreatitis. Patient also denies personal and family history of thyroid cancer and multiple endocrine neoplasia type 2 (MEN 2 tumor). - [place] started May 2023 at weight of 227.4 lbs. Tolerating well and helping with appetite. Has 3 pens left and has not been able to find Saxenda, but is able to find 1 mg dose of Wegovy. Discussed changing to [facility]FORT ZOIE at starting dose of 0.25 mg versus starting at 1 mg. Discussed increase for risk of potential side effects and pancreatitis with starting at 1 mg, but better chance of finding stock. She would like to start Wegovy 1 mg. She will contact local pharmacies to try to find Wegovy 1 mg and she will then message me with that information so the script can be sent. - Advised to use some form of birth control while on weight loss medication.   - Screening labs done Feb 2023. Initial: 226.8 lbs BMI 40.18  Last visit: 227.4 lbs BMI 41.59  Current: 215 lbs BMI 39.32  Change: -12 lbs (-12 lbs since the last office visit, 5% weight loss since starting Saxenda)  Goal: 180 Lbs      Goals:  Do not skip meals. Continue to supplement with protein shakes or bars if needed. Restart food logging. 1300 calories per day. Keep up the great work with water intake. Follow 30/60 rule.    Keep up the great work with exercise  Stop [facility]

## 2023-09-12 NOTE — PROGRESS NOTES
Assessment/Plan:     Obesity, Class II, BMI 35-39.9  - Preston Trimble 05/08/2018. - Patient is pursuing Conservative Program   - Not currently interested in revision surgery, wants to try medical weight management first.   - Initial weight loss goal of 5-10% weight loss for improved health  - Denies history of seizures, kidney stones, or glaucoma. - Patient denies personal history of pancreatitis. Patient also denies personal and family history of thyroid cancer and multiple endocrine neoplasia type 2 (MEN 2 tumor). - Chata Andrews started May 2023 at weight of 227.4 lbs. Tolerating well and helping with appetite. Has 3 pens left and has not been able to find Saxenda, but is able to find 1 mg dose of Wegovy. [facility] starting dose of 0.25 mg versus starting at 1 mg. Discussed increase for risk of potential side effects and pancreatitis with starting at 1 mg, but better chance of finding stock. She would like to start Wegovy 1 mg. She will contact local pharmacies to try to find Wegovy 1 mg and she will then message me with that information so the script can be sent. - Advised to use some form of birth control while on weight loss medication.   - Screening labs done Feb 2023. Initial: 226.8 lbs BMI 40.18  Last visit: 227.4 lbs BMI 41.59  Current: 215 lbs BMI 39.32  Change: -12 lbs (-12 lbs since the last office visit, 5% weight loss since starting Saxenda)  Goal: 180 Lbs      Goals:  Do not skip meals. Continue to supplement with protein shakes or bars if needed. Restart food logging. 1300 calories per day. Keep up the great work with water intake. Follow 30/60 rule. Keep up the great work with exercise  Stop [facility]     Bariatric surgery status  s/p Vertical Sandra Trimble 05/08/2018    Postsurgical malabsorption  - Scheduled for annual with surgical GIO in February 2024.     Gastroesophageal reflux disease without esophagitis  - Taking omeprazole. May improve with weight loss and lifestyle modification. Continue management with prescribing provider. Azalea Willoughby was seen today for follow-up. Diagnoses and all orders for this visit:    Obesity, Class II, BMI 35-39.9    Bariatric surgery status    Postsurgical malabsorption    Gastroesophageal reflux disease without esophagitis            Follow up in approximately 2 month nurse visit and 4 months with Non-Surgical Physician/Advanced Practitioner. Subjective:   Chief Complaint   Patient presents with   • Follow-up     MWM 4m f/u; waist-42in       Patient ID: Jose Zarate  is a 41 y.o. female with excess weight/obesity here to pursue weight management. Patient is pursuing Conservative Program.   Most recent notes and records were reviewed. HPI    Wt Readings from Last 10 Encounters:   09/12/23 97.5 kg (215 lb)   07/18/23 98 kg (216 lb)   05/23/23 103 kg (227 lb 6.4 oz)   02/21/23 103 kg (226 lb 12.8 oz)   02/16/23 104 kg (229 lb)   01/27/23 104 kg (230 lb)   12/21/22 104 kg (230 lb)   11/26/22 102 kg (225 lb)   11/04/22 102 kg (225 lb)   09/14/22 100 kg (221 lb)       S/P Vertical Miguelina Greenberg 05/08/2018. Weight prior to surgery was 246.5 lbs, braydon about 178 lbs. Following with MWM for weight regain. Not currently interested in revision surgery. Taking Saxenda 3 mg daily. No negative side effects and helping with appetite. No cravings. Having difficulty finding [employer]. Has about 3 pens left. Was food logging, but fell out of it. Plans on restarting. Follows 30/30 rule. Was on Wellbutrin in the past for depression. No difference with appetite. Currently working nightshift and often works overtime. After work goes right to bed, does not eat then until 1-2 pm gets up and has protein shake or eggs or overnight oats.  Has dinner in the evening chicken or pork or hamburger and veggies and starch. At work has beef jerky and cheese or protein bar or oatmeal.     Hydration: 60-90 oz water, 1 cup coffee with cream and sweet and low, occ crystal light at work, occ iced tea unsweetened    Alcohol: none  Smoking: denies  Exercise: 1.5-3 miles walking 4-5 times per week  Occupation: nightshift   Sleep: 4-5 hours      Colonoscopy: due, has referral, encouraged to schedule   Mammogram: due, has referral, encouraged to schedule       The following portions of the patient's history were reviewed and updated as appropriate: allergies, current medications, past family history, past medical history, past social history, past surgical history, and problem list.    Family History   Problem Relation Age of Onset   • Heart disease Mother    • Hypertension Mother    • Heart attack Mother    • Diabetes type II Mother         Mellitus   • Hypertension Father    • Diabetes type II Father         Mellitus   • No Known Problems Sister    • No Known Problems Sister    • No Known Problems Paternal Aunt    • No Known Problems Maternal Grandmother    • No Known Problems Maternal Grandfather    • No Known Problems Paternal Grandmother    • No Known Problems Paternal Grandfather    • Cancer Neg Hx    • Stroke Neg Hx    • Thyroid disease Neg Hx         Review of Systems   HENT: Negative for sore throat. Respiratory: Negative for cough and shortness of breath. Cardiovascular: Negative for chest pain and palpitations. Gastrointestinal: Positive for diarrhea (intermittent). Negative for abdominal pain, constipation, nausea and vomiting.        + GERD controlled with medication   Musculoskeletal: Negative for arthralgias and back pain. Skin: Negative for rash. Psychiatric/Behavioral: Negative for suicidal ideas (or HI).         Denies depression and anxiety       Objective:  /80   Pulse 63   Resp 17   Ht 5' 2" (1.575 m)   Wt 97.5 kg (215 lb)   BMI 39.32 kg/m²     Physical Exam  Vitals and nursing note reviewed. Constitutional   General appearance: Abnormal.  well developed and obese. Eyes No conjunctival injection. Ears, Nose, Mouth, and Throat Oral mucosa moist.   Pulmonary   Respiratory effort: No increased work of breathing or signs of respiratory distress. Cardiovascular     Examination of extremities for edema and/or varicosities: Normal.  no edema. Abdomen   Abdomen: Abnormal.  The abdomen was obese.     Musculoskeletal   Normal range of motion  Neurological   Gait and station: Normal.    Psychiatric   Orientation to person, place and time: Normal.    Affect: appropriate

## 2023-09-12 NOTE — ASSESSMENT & PLAN NOTE
- Taking omeprazole. May improve with weight loss and lifestyle modification. Continue management with prescribing provider.

## 2023-09-13 ENCOUNTER — TELEPHONE (OUTPATIENT)
Dept: BARIATRICS | Facility: CLINIC | Age: 48
End: 2023-09-13

## 2023-10-04 ENCOUNTER — TELEPHONE (OUTPATIENT)
Dept: BARIATRICS | Facility: CLINIC | Age: 48
End: 2023-10-04

## 2023-10-04 NOTE — TELEPHONE ENCOUNTER
[facility] Pharm and Pt was inform of Wegovy approval. Pt was inform to lose 4/5% of Wt for renewal purpoese.

## 2023-10-27 ENCOUNTER — PATIENT MESSAGE (OUTPATIENT)
Dept: BARIATRICS | Facility: CLINIC | Age: 48
End: 2023-10-27

## 2023-10-27 DIAGNOSIS — Z98.84 BARIATRIC SURGERY STATUS: ICD-10-CM

## 2023-10-27 DIAGNOSIS — E66.9 OBESITY, CLASS II, BMI 35-39.9: Primary | ICD-10-CM

## 2023-10-27 RX ORDER — SEMAGLUTIDE 1.7 MG/.75ML
1.7 INJECTION, SOLUTION SUBCUTANEOUS WEEKLY
Qty: 3 ML | Refills: 0 | Status: SHIPPED | OUTPATIENT
Start: 2023-10-27

## 2023-10-31 ENCOUNTER — OFFICE VISIT (OUTPATIENT)
Dept: URGENT CARE | Age: 48
End: 2023-10-31
Payer: COMMERCIAL

## 2023-10-31 VITALS
HEART RATE: 70 BPM | OXYGEN SATURATION: 99 % | SYSTOLIC BLOOD PRESSURE: 140 MMHG | DIASTOLIC BLOOD PRESSURE: 87 MMHG | RESPIRATION RATE: 18 BRPM | TEMPERATURE: 97.5 F

## 2023-10-31 DIAGNOSIS — M54.42 ACUTE LEFT-SIDED LOW BACK PAIN WITH LEFT-SIDED SCIATICA: Primary | ICD-10-CM

## 2023-10-31 PROCEDURE — 99213 OFFICE O/P EST LOW 20 MIN: CPT

## 2023-10-31 RX ORDER — METHOCARBAMOL 500 MG/1
500 TABLET, FILM COATED ORAL 3 TIMES DAILY
Qty: 21 TABLET | Refills: 0 | Status: SHIPPED | OUTPATIENT
Start: 2023-10-31 | End: 2023-11-07

## 2023-10-31 RX ORDER — PREDNISONE 20 MG/1
40 TABLET ORAL DAILY
Qty: 8 TABLET | Refills: 0 | Status: SHIPPED | OUTPATIENT
Start: 2023-10-31 | End: 2023-11-04

## 2023-10-31 NOTE — LETTER
October 31, 2023     Patient: Piero Clancy   YOB: 1975   Date of Visit: 10/31/2023       To Whom it May Concern: Tayo Navarrete was seen in my clinic on 10/31/2023. She may return to work on 11/02/2023 . If you have any questions or concerns, please don't hesitate to call.          Sincerely,          STEVE Catalan        CC: No Recipients

## 2023-11-03 NOTE — PROGRESS NOTES
[facility]        NAME: Aniceto Arenas is a 57 y.o. female  : 1975    MRN: [id number]  DATE: November 3, 2023  TIME: 8:30 AM    Assessment and Plan   Acute left-sided low back pain with left-sided sciatica [M54.42]  1. Acute left-sided low back pain with left-sided sciatica  predniSONE 20 mg tablet    methocarbamol (ROBAXIN) 500 mg tablet        Lower left back pain with radiation. Was walking a large dog and got pulled. Denies urinary symptoms. Attempting tylenol/motrin without relief. Patient Instructions       Follow up with PCP as needed    Chief Complaint     Chief Complaint   Patient presents with    Back Pain     Pt c/o left lower back pain, pinching in nature. Pain for three days but has become worse today. Taking IBU, took dose of husbands robaxin without relief. Pt was walking a dog 3 days ago and maybe "pulled something" then. History of Present Illness       Lower left back pain with radiation. Was walking a large dog and got pulled. Denies urinary symptoms. Attempting tylenol/motrin without relief. Review of Systems   Review of Systems   Musculoskeletal:  Positive for back pain. Skin:  Negative for color change. All other systems reviewed and are negative.         Current Medications       Current Outpatient Medications:     methocarbamol (ROBAXIN) 500 mg tablet, Take 1 tablet (500 mg total) by mouth 3 (three) times a day for 7 days, Disp: 21 tablet, Rfl: 0    predniSONE 20 mg tablet, Take 2 tablets (40 mg total) by mouth daily for 4 days, Disp: 8 tablet, Rfl: 0    Semaglutide-Weight Management (Wegovy) 1.7 MG/0.75ML, Inject 0.75 mL (1.7 mg total) under the skin once a week, Disp: 3 mL, Rfl: 0    CALCIUM PO, Take 1 each by mouth 3 (three) times a day (Patient not taking: Reported on 10/31/2023), Disp: , Rfl:     Diclofenac Sodium (VOLTAREN) 1 %, Apply 2 g topically 4 (four) times a day (Patient not taking: Reported on 10/31/2023), Disp: 50 g, Rfl: 1 Multiple Vitamin (MULTIVITAMIN) tablet, Take 1 tablet by mouth daily (Patient not taking: Reported on 10/31/2023), Disp: , Rfl:     omeprazole (PriLOSEC) 20 mg delayed release capsule, Take 1 capsule (20 mg total) by mouth daily (Patient not taking: Reported on 10/31/2023), Disp: 30 capsule, Rfl: 2    VITAMIN D, CHOLECALCIFEROL, PO, Take 1 tablet by mouth in the morning (Patient not taking: Reported on 10/31/2023), Disp: , Rfl:     Current Allergies     Allergies as of 10/31/2023 - Reviewed 10/31/2023   Allergen Reaction Noted    Nsaids GI Intolerance 11/26/2022            The following portions of the patient's history were reviewed and updated as appropriate: allergies, current medications, past family history, past medical history, past social history, past surgical history and problem list.     Past Medical History:   Diagnosis Date    Anemia     Bariatric surgery status     CPAP (continuous positive airway pressure) dependence     Depression     History of anxiety     Left knee pain     "occas"    Low back pain     "mayra if standing for a while"    Morbid obesity (HCC)     Motion sickness     Obesity     PONV (postoperative nausea and vomiting)     Postgastrectomy malabsorption     Prediabetes     Right ankle pain     " occas"    Shortness of breath     "exertional"    Sleep apnea     Mild    Walks frequently     for exercise    Wears glasses        Past Surgical History:   Procedure Laterality Date    ANKLE SURGERY Bilateral     Right with screws and plate implant. Left ACL repair Resolved: [facility] Left     screws implanted    BARIATRIC SURGERY  05/08/2018    KNEE CARTILAGE SURGERY      right    KNEE SURGERY Left     SD EGD TRANSORAL BIOPSY SINGLE/MULTIPLE N/A 3/21/2018    Procedure: ESOPHAGOGASTRODUODENOSCOPY (EGD) with bx;  Surgeon: Ana Escalante MD;  Location: [facility];   Service: Bariatrics    SD LAPS [facility] LONGITUDINAL GASTRECTOMY N/A 5/8/2018    Procedure: LAPAROSCOPIC SLEEVE GASTRECTOMY WITH ROBOTICS;  Surgeon: Yaz Quintanilla MD;  Location: [facility];  Service: Bariatrics    REDUCTION MAMMAPLASTY Bilateral     Resolved: 2004    WISDOM TOOTH EXTRACTION         Family History   Problem Relation Age of Onset    Heart disease Mother     Hypertension Mother     Heart attack Mother     Diabetes type II Mother         Mellitus    Hypertension Father     Diabetes type II Father         Mellitus    No Known Problems Sister     No Known Problems Sister     No Known Problems Paternal Aunt     No Known Problems Maternal Grandmother     No Known Problems Maternal Grandfather     No Known Problems Paternal Grandmother     No Known Problems Paternal Grandfather     Cancer Neg Hx     Stroke Neg Hx     Thyroid disease Neg Hx          Medications have been verified. Objective   /87   Pulse 70   Temp 97.5 °F (36.4 °C) (Tympanic)   Resp 18   SpO2 99%   No LMP recorded. Physical Exam     Physical Exam  Vitals reviewed. Constitutional:       Appearance: Normal appearance. Musculoskeletal:         General: Tenderness present. No swelling, deformity or signs of injury. Skin:     Findings: No bruising. Neurological:      Mental Status: She is alert.

## 2023-11-14 ENCOUNTER — CLINICAL SUPPORT (OUTPATIENT)
Dept: BARIATRICS | Facility: CLINIC | Age: 48
End: 2023-11-14

## 2023-11-14 VITALS
SYSTOLIC BLOOD PRESSURE: 120 MMHG | BODY MASS INDEX: 39.05 KG/M2 | HEIGHT: 62 IN | HEART RATE: 61 BPM | WEIGHT: 212.2 LBS | RESPIRATION RATE: 14 BRPM | DIASTOLIC BLOOD PRESSURE: 70 MMHG

## 2023-11-14 DIAGNOSIS — R63.5 ABNORMAL WEIGHT GAIN: Primary | ICD-10-CM

## 2023-11-14 PROCEDURE — RECHECK

## 2023-11-26 ENCOUNTER — PATIENT MESSAGE (OUTPATIENT)
Dept: BARIATRICS | Facility: CLINIC | Age: 48
End: 2023-11-26

## 2023-11-26 DIAGNOSIS — E66.9 OBESITY, CLASS II, BMI 35-39.9: Primary | ICD-10-CM

## 2023-11-27 RX ORDER — SEMAGLUTIDE 2.4 MG/.75ML
2.4 INJECTION, SOLUTION SUBCUTANEOUS WEEKLY
Qty: 3 ML | Refills: 2 | Status: SHIPPED | OUTPATIENT
Start: 2023-11-27

## 2023-12-12 ENCOUNTER — PATIENT MESSAGE (OUTPATIENT)
Dept: BARIATRICS | Facility: CLINIC | Age: 48
End: 2023-12-12

## 2023-12-12 DIAGNOSIS — E66.9 OBESITY, CLASS II, BMI 35-39.9: ICD-10-CM

## 2023-12-12 RX ORDER — SEMAGLUTIDE 2.4 MG/.75ML
2.4 INJECTION, SOLUTION SUBCUTANEOUS WEEKLY
Qty: 3 ML | Refills: 2 | Status: SHIPPED | OUTPATIENT
Start: 2023-12-12

## 2024-01-04 ENCOUNTER — HOSPITAL ENCOUNTER (OUTPATIENT)
Dept: RADIOLOGY | Facility: IMAGING CENTER | Age: 49
End: 2024-01-04
Payer: COMMERCIAL

## 2024-01-04 VITALS — HEIGHT: 62 IN | WEIGHT: 205 LBS | BODY MASS INDEX: 37.73 KG/M2

## 2024-01-04 DIAGNOSIS — Z12.31 SCREENING MAMMOGRAM FOR BREAST CANCER: ICD-10-CM

## 2024-01-04 PROCEDURE — 77067 SCR MAMMO BI INCL CAD: CPT

## 2024-01-04 PROCEDURE — 77063 BREAST TOMOSYNTHESIS BI: CPT

## 2024-01-18 ENCOUNTER — PATIENT MESSAGE (OUTPATIENT)
Dept: BARIATRICS | Facility: CLINIC | Age: 49
End: 2024-01-18

## 2024-01-18 DIAGNOSIS — E66.9 OBESITY, CLASS II, BMI 35-39.9: ICD-10-CM

## 2024-01-18 RX ORDER — SEMAGLUTIDE 2.4 MG/.75ML
2.4 INJECTION, SOLUTION SUBCUTANEOUS WEEKLY
Qty: 3 ML | Refills: 2 | Status: SHIPPED | OUTPATIENT
Start: 2024-01-18

## 2024-02-05 ENCOUNTER — ANNUAL EXAM (OUTPATIENT)
Dept: GYNECOLOGY | Facility: CLINIC | Age: 49
End: 2024-02-05
Payer: COMMERCIAL

## 2024-02-05 VITALS
DIASTOLIC BLOOD PRESSURE: 70 MMHG | BODY MASS INDEX: 37.69 KG/M2 | HEIGHT: 62 IN | WEIGHT: 204.8 LBS | SYSTOLIC BLOOD PRESSURE: 104 MMHG

## 2024-02-05 DIAGNOSIS — Z01.419 ENCOUNTER FOR WELL WOMAN EXAM: Primary | ICD-10-CM

## 2024-02-05 DIAGNOSIS — N92.6 IRREGULAR MENSES: ICD-10-CM

## 2024-02-05 DIAGNOSIS — Z12.39 ENCOUNTER FOR SCREENING BREAST EXAMINATION: ICD-10-CM

## 2024-02-05 DIAGNOSIS — Z12.31 SCREENING MAMMOGRAM FOR BREAST CANCER: ICD-10-CM

## 2024-02-05 PROCEDURE — S0612 ANNUAL GYNECOLOGICAL EXAMINA: HCPCS | Performed by: PHYSICIAN ASSISTANT

## 2024-02-05 NOTE — PROGRESS NOTES
"Assessment/Plan:    No problem-specific Assessment & Plan notes found for this encounter.       Diagnoses and all orders for this visit:    Encounter for well woman exam    Encounter for screening breast examination    Screening mammogram for breast cancer  -     Mammo screening bilateral w 3d & cad; Future    Irregular menses          Subjective:      Patient ID: Kellen Garcia is a 48 y.o. female.    Pt presents for her annual exam today--  She has no complaints  She has irregular bleeding   no pelvic pain  Bowel and bladder are regular  Colonoscopy--due  No breast concerns today  Last mammo--1/2024    No pap today.    Rx mammo  Daily ca, d        The following portions of the patient's history were reviewed and updated as appropriate: allergies, current medications, past family history, past medical history, past social history, past surgical history, and problem list.    Review of Systems   Constitutional:  Negative for chills, fever and unexpected weight change.   HENT:  Negative for ear pain and sore throat.    Eyes:  Negative for pain and visual disturbance.   Respiratory:  Negative for cough and shortness of breath.    Cardiovascular:  Negative for chest pain and palpitations.   Gastrointestinal:  Negative for abdominal pain, blood in stool, constipation, diarrhea and vomiting.   Genitourinary: Negative.  Negative for dysuria and hematuria.   Musculoskeletal:  Negative for arthralgias and back pain.   Skin:  Negative for color change and rash.   Neurological:  Negative for seizures and syncope.   All other systems reviewed and are negative.        Objective:      /70   Ht 5' 2\" (1.575 m)   Wt 92.9 kg (204 lb 12.8 oz)   LMP 02/04/2024 (Exact Date)   BMI 37.46 kg/m²          Physical Exam  Vitals and nursing note reviewed.   Constitutional:       Appearance: Normal appearance. She is well-developed.   HENT:      Head: Normocephalic and atraumatic.   Chest:   Breasts:     Right: No inverted " nipple, mass, nipple discharge or skin change.      Left: No inverted nipple, mass, nipple discharge or skin change.   Abdominal:      Palpations: Abdomen is soft.   Genitourinary:     General: Normal vulva.      Exam position: Supine.      Labia:         Right: No rash, tenderness or lesion.         Left: No rash, tenderness or lesion.       Vagina: Normal.      Cervix: No cervical motion tenderness, discharge or friability.      Uterus: Normal.       Adnexa: Right adnexa normal and left adnexa normal.        Right: No mass, tenderness or fullness.          Left: No mass, tenderness or fullness.     Musculoskeletal:      Cervical back: Normal range of motion.   Lymphadenopathy:      Lower Body: No right inguinal adenopathy. No left inguinal adenopathy.   Neurological:      Mental Status: She is alert.

## 2024-02-19 ENCOUNTER — OFFICE VISIT (OUTPATIENT)
Dept: BARIATRICS | Facility: CLINIC | Age: 49
End: 2024-02-19
Payer: COMMERCIAL

## 2024-02-19 VITALS
WEIGHT: 200 LBS | OXYGEN SATURATION: 99 % | HEART RATE: 78 BPM | HEIGHT: 62 IN | BODY MASS INDEX: 36.8 KG/M2 | DIASTOLIC BLOOD PRESSURE: 84 MMHG | SYSTOLIC BLOOD PRESSURE: 120 MMHG | TEMPERATURE: 96.5 F

## 2024-02-19 DIAGNOSIS — Z48.815 ENCOUNTER FOR SURGICAL AFTERCARE FOLLOWING SURGERY OF DIGESTIVE SYSTEM: Primary | ICD-10-CM

## 2024-02-19 DIAGNOSIS — K91.2 POSTSURGICAL MALABSORPTION: ICD-10-CM

## 2024-02-19 DIAGNOSIS — E66.9 OBESITY, CLASS II, BMI 35-39.9: ICD-10-CM

## 2024-02-19 DIAGNOSIS — K21.9 GERD (GASTROESOPHAGEAL REFLUX DISEASE): ICD-10-CM

## 2024-02-19 DIAGNOSIS — Z12.11 COLON CANCER SCREENING: ICD-10-CM

## 2024-02-19 DIAGNOSIS — Z98.84 BARIATRIC SURGERY STATUS: ICD-10-CM

## 2024-02-19 PROCEDURE — 99214 OFFICE O/P EST MOD 30 MIN: CPT | Performed by: NURSE PRACTITIONER

## 2024-02-19 NOTE — PROGRESS NOTES
Assessment/Plan:     Patient ID: Kellen Garcia is a 48 y.o. female.    Bariatric Surgery Status/GERD     -s/p Vertical Sleeve Gastrectomy with Dr. Nikhil Orantes on 05/08/2018. Presents to the office today for annual visit. Has been following with our MWM to help further weight loss - started with saxenda and eventually switched to wegovy. Currently taking Wegovy 2.4 mg weekly. Since last surgical annual visit, she has been able to lose 29 lbs. Feels better; appetite is suppressed. Recently switched to day shift so she is trying to make adjustments to her diet and exercise plan. Denies having any abdominal pain, N/V/D/C, regurgitation, reflux or dysphagia. Taking her MVI and calcium daily. Takes vitamin D3 as needed/once a week. Heartburn has been much better - has been taking her omeprazole PRN but has not needed this for a long time.     PLAN:     - continue with MWM to help further weight loss.   - Routine follow up in 1 year for annual visit  - Continue with healthy lifestyle, adequate protein intake of 60 gm, fluid intake of at least 64 oz.   - Continue with MVI daily.   - Activity as tolerated.   - Labs ordered and will adjust accordingly if any deficiency.   - Follow up with RD and SW as needed.       Continued/Maintain healthy weight loss with good nutrition intakes.  Adequate hydration with at least 64oz. fluid intake.  Follow diet as discussed.  Follow vitamin and mineral recommendations as reviewed with you.  Exercise as tolerated.    Colonoscopy referral made: due- referral to GI made.   Mammogram - UTD  EGD screening - UTD - due in 2025    Follow-up in 1 year for annual visit. We kindly ask that your arrive 15 minutes before your scheduled appointment time with your provider to allow our staff to room you, get your vital signs and update your chart.    Get lab work done prior to annual visit. Please call the office if you need a script.  It is recommended to check with your insurance BEFORE getting labs  done to make sure they are covered by your policy.      Call our office if you have any problems with abdominal pain especially associated with fever, chills, nausea, vomiting or any other concerns.    All  Post-bariatric surgery patients should be aware that very small quantities of any alcohol can cause impairment and it is very possible not to feel the effect. The effect can be in the system for several hours.  It is also a stomach irritant.     It is advised to AVOID alcohol, Nonsteroidal antiinflammatory drugs (NSAIDS) and nicotine of all forms . Any of these can cause stomach irritation/pain.    Discussed the effects of alcohol on a bariatric patient and the increased impairment risk.     Keep up the good work!     Postsurgical Malabsorption   -At risk for malabsorption of vitamins/minerals secondary to malabsorption and restriction of intake from bariatric surgery  -Currently taking adequate postop bariatric surgery vitamin supplementation  -Last set of bariatric labs completed on 02/2023 and showed low vitamin D   -Next set of bariatric labs ordered for approximately 2 weeks  -Patient received education about the importance of adhering to a lifelong supplementation regimen to avoid vitamin/mineral deficiencies      Diagnoses and all orders for this visit:    Encounter for surgical aftercare following surgery of digestive system  -     CBC; Future  -     Comprehensive metabolic panel; Future  -     Folate; Future  -     Iron Panel (Includes Ferritin, Iron Sat%, Iron, and TIBC); Future  -     PTH, intact; Future  -     Vitamin A; Future  -     Vitamin B1, whole blood; Future  -     Vitamin B12; Future  -     Vitamin D 25 hydroxy; Future  -     Zinc; Future  -     Ambulatory referral to Gastroenterology; Future    Bariatric surgery status  -     CBC; Future  -     Comprehensive metabolic panel; Future  -     Folate; Future  -     Iron Panel (Includes Ferritin, Iron Sat%, Iron, and TIBC); Future  -     PTH,  intact; Future  -     Vitamin A; Future  -     Vitamin B1, whole blood; Future  -     Vitamin B12; Future  -     Vitamin D 25 hydroxy; Future  -     Zinc; Future  -     Ambulatory referral to Gastroenterology; Future    Postsurgical malabsorption  -     CBC; Future  -     Comprehensive metabolic panel; Future  -     Folate; Future  -     Iron Panel (Includes Ferritin, Iron Sat%, Iron, and TIBC); Future  -     PTH, intact; Future  -     Vitamin A; Future  -     Vitamin B1, whole blood; Future  -     Vitamin B12; Future  -     Vitamin D 25 hydroxy; Future  -     Zinc; Future    Obesity, Class II, BMI 35-39.9  -     CBC; Future  -     Comprehensive metabolic panel; Future  -     Folate; Future  -     Iron Panel (Includes Ferritin, Iron Sat%, Iron, and TIBC); Future  -     PTH, intact; Future  -     Vitamin A; Future  -     Vitamin B1, whole blood; Future  -     Vitamin B12; Future  -     Vitamin D 25 hydroxy; Future  -     Zinc; Future    GERD (gastroesophageal reflux disease)    Colon cancer screening  -     Ambulatory referral to Gastroenterology; Future         Subjective:      Patient ID: Kellen Garcia is a 48 y.o. female.    -s/p Vertical Sleeve Gastrectomy with Dr. Nikhil Orantes on 05/08/2018. Presents to the office today for annual visit. Has been following with our MWM to help further weight loss - started with saxenda and eventually switched to wegovy. Currently taking Wegovy 2.4 mg weekly. Since last surgical annual visit, she has been able to lose 29 lbs. Feels better; appetite is suppressed. Recently switched to day shift so she is trying to make adjustments to her diet and exercise plan. Denies having any abdominal pain, N/V/D/C, regurgitation, reflux or dysphagia. Taking her MVI and calcium daily. Takes vitamin D3 as needed/once a week. Heartburn has been much better - has been taking her omeprazole PRN but has not needed this for a long time.     Initial: 246.5 lbs   Current: 200 lbs   EWL: (Weight loss  "is ahead of schedule at this post surgical period.)  Gary: 169 lbs   Current BMI is Body mass index is 36.58 kg/m².    Tolerating a regular diet-yes  Eating at least 60 grams of protein per day-yes  Following 30/60 minute rule with liquids-yes - does 30/30   Drinking at least 64 ounces of fluid per day-yes  Drinking carbonated beverages-no  Sufficient exercise-yes - still walking often; interested in starting to do weight training.   Using NSAIDs regularly-no  Using nicotine-no  Using alcohol-no  Supplements: Multivitamins, Calcium , and vitamin D 3 - but takes vitamin D once a week - not consistent     EWL is 44%, which places the patient ahead of schedule for expected post surgical weight loss at this time.     The following portions of the patient's history were reviewed and updated as appropriate: allergies, current medications, past family history, past medical history, past social history, past surgical history and problem list.    Review of Systems   Constitutional: Negative.    Respiratory: Negative.     Cardiovascular: Negative.    Gastrointestinal: Negative.    Musculoskeletal: Negative.    Neurological: Negative.    Psychiatric/Behavioral: Negative.           Objective:    /84 (BP Location: Left arm, Patient Position: Sitting, Cuff Size: Large)   Pulse 78   Temp (!) 96.5 °F (35.8 °C) (Tympanic)   Ht 5' 2\" (1.575 m)   Wt 90.7 kg (200 lb)   LMP 02/04/2024 (Exact Date)   SpO2 99%   BMI 36.58 kg/m²      Physical Exam  Vitals and nursing note reviewed.   Constitutional:       Appearance: Normal appearance. She is obese.   Cardiovascular:      Rate and Rhythm: Normal rate and regular rhythm.      Pulses: Normal pulses.      Heart sounds: Normal heart sounds.   Pulmonary:      Effort: Pulmonary effort is normal.      Breath sounds: Normal breath sounds.   Abdominal:      General: Bowel sounds are normal.      Palpations: Abdomen is soft.      Tenderness: There is no abdominal tenderness. "   Musculoskeletal:         General: Normal range of motion.   Skin:     General: Skin is warm and dry.   Neurological:      General: No focal deficit present.      Mental Status: She is alert and oriented to person, place, and time.   Psychiatric:         Mood and Affect: Mood normal.         Behavior: Behavior normal.         Thought Content: Thought content normal.         Judgment: Judgment normal.

## 2024-03-09 DIAGNOSIS — E66.9 OBESITY, CLASS II, BMI 35-39.9: ICD-10-CM

## 2024-03-11 RX ORDER — SEMAGLUTIDE 2.4 MG/.75ML
2.4 INJECTION, SOLUTION SUBCUTANEOUS WEEKLY
Qty: 3 ML | Refills: 2 | Status: SHIPPED | OUTPATIENT
Start: 2024-03-11

## 2024-05-28 ENCOUNTER — OFFICE VISIT (OUTPATIENT)
Dept: BARIATRICS | Facility: CLINIC | Age: 49
End: 2024-05-28
Payer: COMMERCIAL

## 2024-05-28 VITALS
SYSTOLIC BLOOD PRESSURE: 120 MMHG | BODY MASS INDEX: 36.47 KG/M2 | HEIGHT: 62 IN | DIASTOLIC BLOOD PRESSURE: 84 MMHG | HEART RATE: 93 BPM | WEIGHT: 198.2 LBS

## 2024-05-28 DIAGNOSIS — Z98.84 BARIATRIC SURGERY STATUS: Chronic | ICD-10-CM

## 2024-05-28 DIAGNOSIS — E66.9 OBESITY, CLASS II, BMI 35-39.9: Primary | ICD-10-CM

## 2024-05-28 PROCEDURE — 99214 OFFICE O/P EST MOD 30 MIN: CPT | Performed by: NURSE PRACTITIONER

## 2024-05-28 RX ORDER — SEMAGLUTIDE 2.4 MG/.75ML
2.4 INJECTION, SOLUTION SUBCUTANEOUS WEEKLY
Qty: 3 ML | Refills: 4 | Status: SHIPPED | OUTPATIENT
Start: 2024-05-28

## 2024-05-28 NOTE — PROGRESS NOTES
Assessment/Plan:     Obesity, Class II, BMI 35-39.9  - S/P Vertical Sleeve Gastrectomy with Dr. Nikhil Orantes on 05/08/2018.  - Patient is pursuing Conservative Program   - Not currently interested in revision surgery, wants to try medical weight management first.   - Initial weight loss goal of 5-10% weight loss for improved health  - Denies history of seizures, kidney stones, or glaucoma.   - Patient denies personal history of pancreatitis. Patient also denies personal and family history of thyroid cancer and multiple endocrine neoplasia type 2 (MEN 2 tumor).   - Saxenda started May 2023 at weight of 227.4 lbs. Due to supply chain difficulty, changed to Wegovy Oct 2023 at weight of 215 lbs.   - Continue Wegovy 2.4 mg weekly, tolerating well and helping with appetite.    - Advised to use some form of birth control while on weight loss medication.   - Medication contract signed May 2023.  - Getting routine blood work through surgical team.  - check A1C and TSH.      Initial: 226.8 lbs BMI 40.18  Last visit: 215 lbs BMI 39.32  Current: 198.1 lbs BMI 36.25  Change: -29 lbs (-17 lbs since the last office visit)  Goal: 180 Lbs      Goals:  Do not skip meals.   Restart food logging.  1300 calories per day.   Increase water intake to at least 64 oz daily   Follow 30/60 rule.   Restart exercise, such as walking 2 days per week for 10 minutes and gradually increase to goal of 5 days per week for 30 minutes.   Continue Wegovy.    Bariatric surgery status  s/p Vertical Sleeve Gastrectomy with Dr. Nikhil Orantes on 05/08/2018  Scheduled for annual Feb 2025         Kellen was seen today for follow-up.    Diagnoses and all orders for this visit:    Obesity, Class II, BMI 35-39.9  -     Semaglutide-Weight Management (Wegovy) 2.4 MG/0.75ML; Inject 0.75 mL (2.4 mg total) under the skin once a week  -     Hemoglobin A1C; Future  -     TSH, 3rd generation with Free T4 reflex; Future    Bariatric surgery status        Follow up in approximately   2-3 month nurse visit and 4-5 months  with Non-Surgical Physician/Advanced Practitioner.    Subjective:   Chief Complaint   Patient presents with    Follow-up     Pt is here for MWM f/u       Patient ID: Kellen Garcia  is a 48 y.o. female with excess weight/obesity here to pursue weight management.  Patient is pursuing Conservative Program.   Most recent notes and records were reviewed.    HPI    Wt Readings from Last 10 Encounters:   05/28/24 89.9 kg (198 lb 3.2 oz)   02/19/24 90.7 kg (200 lb)   02/05/24 92.9 kg (204 lb 12.8 oz)   01/04/24 93 kg (205 lb)   11/14/23 96.3 kg (212 lb 3.2 oz)   09/12/23 97.5 kg (215 lb)   07/18/23 98 kg (216 lb)   05/23/23 103 kg (227 lb 6.4 oz)   02/21/23 103 kg (226 lb 12.8 oz)   02/16/23 104 kg (229 lb)         S/P Vertical Sleeve Gastrectomy with Dr. Nikhil Orantes on 05/08/2018. Weight prior to surgery was 246.5 lbs, braydon about 178 lbs. Following with MWM for weight regain. Not currently interested in revision surgery.     Taking Wegovy 2.4 mg weekly, no negative side effects. Helping with appetite.     Previously taking Saxenda, but could not find stock, therefore changed to Wegovy.    Was food logging, but fell out of it. Plans on restarting.     Follows 30/30 rule.       Was on Wellbutrin in the past for depression. No difference with appetite.        Hydration: 35-40 oz water, 3 times per week has 1 cup coffee    Alcohol: none  Smoking: denies  Exercise: none  Occupation:  - now working day shift  Sleep: 6 hours      Colonoscopy: due, has referral, encouraged to schedule   Mammogram: UTSUNDAY, due Jan 2025       The following portions of the patient's history were reviewed and updated as appropriate: allergies, current medications, past family history, past medical history, past social history, past surgical history, and problem list.    Family History   Problem Relation Age of Onset    Heart disease Mother     Hypertension Mother     Heart attack Mother      "Diabetes type II Mother         Mellitus    Hypertension Father     Diabetes type II Father         Mellitus    No Known Problems Sister     No Known Problems Sister     No Known Problems Paternal Aunt     No Known Problems Maternal Grandmother     No Known Problems Maternal Grandfather     No Known Problems Paternal Grandmother     No Known Problems Paternal Grandfather     Cancer Neg Hx     Stroke Neg Hx     Thyroid disease Neg Hx         Review of Systems   HENT:  Negative for sore throat.    Respiratory:  Negative for cough and shortness of breath.    Cardiovascular:  Negative for chest pain and palpitations.   Gastrointestinal:  Positive for constipation (intermittent) and diarrhea (intermittent). Negative for abdominal pain, nausea and vomiting.        + GERD controlled with medication   Musculoskeletal:  Negative for arthralgias and back pain.   Skin:  Negative for rash.   Psychiatric/Behavioral:  Negative for suicidal ideas (or HI).         Denies depression and anxiety       Objective:  /84 (BP Location: Left arm, Patient Position: Sitting, Cuff Size: Large)   Pulse 93   Ht 5' 2\" (1.575 m)   Wt 89.9 kg (198 lb 3.2 oz)   LMP  (LMP Unknown)   BMI 36.25 kg/m²     Physical Exam  Vitals and nursing note reviewed.          Constitutional   General appearance: Abnormal.  well developed and obese.   Eyes No conjunctival injection.   Ears, Nose, Mouth, and Throat Oral mucosa moist.   Pulmonary   Respiratory effort: No increased work of breathing or signs of respiratory distress.     Cardiovascular     Examination of extremities for edema and/or varicosities: Normal.  no edema.   Abdomen   Abdomen: Abnormal.  The abdomen was obese.    Musculoskeletal   Normal range of motion  Neurological   Gait and station: Normal.    Psychiatric   Orientation to person, place and time: Normal.    Affect: appropriate        "

## 2024-05-29 NOTE — ASSESSMENT & PLAN NOTE
s/p Vertical Sleeve Gastrectomy with Dr. Nikhil Orantes on 05/08/2018  Scheduled for annual Feb 2025

## 2024-05-29 NOTE — ASSESSMENT & PLAN NOTE
- S/P Vertical Sleeve Gastrectomy with Dr. Nikhil Orantes on 05/08/2018.  - Patient is pursuing Conservative Program   - Not currently interested in revision surgery, wants to try medical weight management first.   - Initial weight loss goal of 5-10% weight loss for improved health  - Denies history of seizures, kidney stones, or glaucoma.   - Patient denies personal history of pancreatitis. Patient also denies personal and family history of thyroid cancer and multiple endocrine neoplasia type 2 (MEN 2 tumor).   - Saxenda started May 2023 at weight of 227.4 lbs. Due to supply chain difficulty, changed to Wegovy Oct 2023 at weight of 215 lbs.   - Continue Wegovy 2.4 mg weekly, tolerating well and helping with appetite.    - Advised to use some form of birth control while on weight loss medication.   - Medication contract signed May 2023.  - Getting routine blood work through surgical team.  - check A1C and TSH.      Initial: 226.8 lbs BMI 40.18  Last visit: 215 lbs BMI 39.32  Current: 198.1 lbs BMI 36.25  Change: -29 lbs (-17 lbs since the last office visit)  Goal: 180 Lbs      Goals:  Do not skip meals.   Restart food logging.  1300 calories per day.   Increase water intake to at least 64 oz daily   Follow 30/60 rule.   Restart exercise, such as walking 2 days per week for 10 minutes and gradually increase to goal of 5 days per week for 30 minutes.   Continue Wegovy.

## 2024-08-05 ENCOUNTER — CLINICAL SUPPORT (OUTPATIENT)
Dept: BARIATRICS | Facility: CLINIC | Age: 49
End: 2024-08-05

## 2024-08-05 VITALS
WEIGHT: 191.2 LBS | DIASTOLIC BLOOD PRESSURE: 84 MMHG | BODY MASS INDEX: 35.19 KG/M2 | HEIGHT: 62 IN | HEART RATE: 78 BPM | TEMPERATURE: 98.7 F | SYSTOLIC BLOOD PRESSURE: 116 MMHG | RESPIRATION RATE: 17 BRPM

## 2024-08-05 DIAGNOSIS — R63.5 ABNORMAL WEIGHT GAIN: Primary | ICD-10-CM

## 2024-08-05 PROCEDURE — RECHECK

## 2024-08-05 NOTE — PROGRESS NOTES
Patient last visit weight: 198lbs   Patient current visit weight: 191.2lbs     If you are taking phentermine or other oral weight loss medications, are you experiencing any of the following symptoms:  Headache:   Blurred Vision:   Chest Pain:   Palpitations:  Insomnia:   SPECIFY ORAL MEDICATION AND DOSAGE:     If you are taking an injectable medication,  are you experiencing any of the following symptoms:  Bloating: NO   Nausea: NO   Vomiting: NO   Constipation: NO   Diarrhea: NO   SPECIFY INJECTABLE MEDICATION AND CURRENT DOSAGE: Wegovy 2.4mg       Vitals:    Is BP less than 100/60? NO   Is BP greater than 140/90? NO   Is HR greater than 100? NO   **If yes to any of the above, have patient relax and repeat in 5-10 minutes**    Repeat values:    Is BP less than 100/60?  Is BP greater than 140/90?  Is HR greater than 100?  **If values remain outside of ranges above, please consult provider for next steps**

## 2024-09-16 ENCOUNTER — APPOINTMENT (OUTPATIENT)
Dept: LAB | Facility: IMAGING CENTER | Age: 49
End: 2024-09-16
Payer: COMMERCIAL

## 2024-09-16 ENCOUNTER — OFFICE VISIT (OUTPATIENT)
Dept: INTERNAL MEDICINE CLINIC | Facility: OTHER | Age: 49
End: 2024-09-16
Payer: COMMERCIAL

## 2024-09-16 VITALS
WEIGHT: 191 LBS | OXYGEN SATURATION: 98 % | SYSTOLIC BLOOD PRESSURE: 116 MMHG | DIASTOLIC BLOOD PRESSURE: 70 MMHG | HEART RATE: 64 BPM | HEIGHT: 62 IN | TEMPERATURE: 97.7 F | BODY MASS INDEX: 35.15 KG/M2

## 2024-09-16 DIAGNOSIS — Z00.00 ANNUAL PHYSICAL EXAM: ICD-10-CM

## 2024-09-16 DIAGNOSIS — Z98.84 BARIATRIC SURGERY STATUS: ICD-10-CM

## 2024-09-16 DIAGNOSIS — E66.9 OBESITY, CLASS II, BMI 35-39.9: ICD-10-CM

## 2024-09-16 DIAGNOSIS — K91.2 POSTSURGICAL MALABSORPTION: ICD-10-CM

## 2024-09-16 DIAGNOSIS — Z48.815 ENCOUNTER FOR SURGICAL AFTERCARE FOLLOWING SURGERY OF DIGESTIVE SYSTEM: ICD-10-CM

## 2024-09-16 DIAGNOSIS — E78.00 HYPERCHOLESTEROLEMIA: ICD-10-CM

## 2024-09-16 DIAGNOSIS — Z12.11 SCREEN FOR COLON CANCER: Primary | ICD-10-CM

## 2024-09-16 PROBLEM — Z23 NEED FOR INFLUENZA VACCINATION: Status: RESOLVED | Noted: 2021-03-11 | Resolved: 2024-09-16

## 2024-09-16 PROBLEM — M25.511 RIGHT SHOULDER PAIN: Status: RESOLVED | Noted: 2021-01-14 | Resolved: 2024-09-16

## 2024-09-16 PROBLEM — L72.3 INFECTED SEBACEOUS CYST: Status: RESOLVED | Noted: 2020-05-26 | Resolved: 2024-09-16

## 2024-09-16 PROBLEM — M54.2 NECK PAIN ON RIGHT SIDE: Status: RESOLVED | Noted: 2021-01-14 | Resolved: 2024-09-16

## 2024-09-16 PROBLEM — L08.9 INFECTED SEBACEOUS CYST: Status: RESOLVED | Noted: 2020-05-26 | Resolved: 2024-09-16

## 2024-09-16 PROBLEM — Z28.21 INFLUENZA VACCINATION DECLINED BY PATIENT: Status: RESOLVED | Noted: 2021-03-11 | Resolved: 2024-09-16

## 2024-09-16 LAB
25(OH)D3 SERPL-MCNC: 29.3 NG/ML (ref 30–100)
ALBUMIN SERPL BCG-MCNC: 4.3 G/DL (ref 3.5–5)
ALP SERPL-CCNC: 64 U/L (ref 34–104)
ALT SERPL W P-5'-P-CCNC: 16 U/L (ref 7–52)
ANION GAP SERPL CALCULATED.3IONS-SCNC: 10 MMOL/L (ref 4–13)
AST SERPL W P-5'-P-CCNC: 17 U/L (ref 13–39)
BILIRUB SERPL-MCNC: 0.89 MG/DL (ref 0.2–1)
BUN SERPL-MCNC: 13 MG/DL (ref 5–25)
CALCIUM SERPL-MCNC: 9.4 MG/DL (ref 8.4–10.2)
CHLORIDE SERPL-SCNC: 103 MMOL/L (ref 96–108)
CHOLEST SERPL-MCNC: 241 MG/DL
CO2 SERPL-SCNC: 26 MMOL/L (ref 21–32)
CREAT SERPL-MCNC: 0.71 MG/DL (ref 0.6–1.3)
ERYTHROCYTE [DISTWIDTH] IN BLOOD BY AUTOMATED COUNT: 13.3 % (ref 11.6–15.1)
EST. AVERAGE GLUCOSE BLD GHB EST-MCNC: 103 MG/DL
FERRITIN SERPL-MCNC: 36 NG/ML (ref 11–307)
FOLATE SERPL-MCNC: 11.8 NG/ML
GFR SERPL CREATININE-BSD FRML MDRD: 102 ML/MIN/1.73SQ M
GLUCOSE P FAST SERPL-MCNC: 88 MG/DL (ref 65–99)
HBA1C MFR BLD: 5.2 %
HCT VFR BLD AUTO: 41.2 % (ref 34.8–46.1)
HDLC SERPL-MCNC: 58 MG/DL
HGB BLD-MCNC: 13.6 G/DL (ref 11.5–15.4)
IRON SATN MFR SERPL: 18 % (ref 15–50)
IRON SERPL-MCNC: 70 UG/DL (ref 50–212)
LDLC SERPL CALC-MCNC: 164 MG/DL (ref 0–100)
MCH RBC QN AUTO: 28.7 PG (ref 26.8–34.3)
MCHC RBC AUTO-ENTMCNC: 33 G/DL (ref 31.4–37.4)
MCV RBC AUTO: 87 FL (ref 82–98)
PLATELET # BLD AUTO: 384 THOUSANDS/UL (ref 149–390)
PMV BLD AUTO: 10.3 FL (ref 8.9–12.7)
POTASSIUM SERPL-SCNC: 4.2 MMOL/L (ref 3.5–5.3)
PROT SERPL-MCNC: 7 G/DL (ref 6.4–8.4)
PTH-INTACT SERPL-MCNC: 97.9 PG/ML (ref 12–88)
RBC # BLD AUTO: 4.74 MILLION/UL (ref 3.81–5.12)
SODIUM SERPL-SCNC: 139 MMOL/L (ref 135–147)
TIBC SERPL-MCNC: 393 UG/DL (ref 250–450)
TRIGL SERPL-MCNC: 97 MG/DL
TSH SERPL DL<=0.05 MIU/L-ACNC: 1.98 UIU/ML (ref 0.45–4.5)
UIBC SERPL-MCNC: 323 UG/DL (ref 155–355)
VIT B12 SERPL-MCNC: 241 PG/ML (ref 180–914)
WBC # BLD AUTO: 5.95 THOUSAND/UL (ref 4.31–10.16)

## 2024-09-16 PROCEDURE — 84425 ASSAY OF VITAMIN B-1: CPT

## 2024-09-16 PROCEDURE — 36415 COLL VENOUS BLD VENIPUNCTURE: CPT

## 2024-09-16 PROCEDURE — 84590 ASSAY OF VITAMIN A: CPT

## 2024-09-16 PROCEDURE — 83036 HEMOGLOBIN GLYCOSYLATED A1C: CPT

## 2024-09-16 PROCEDURE — 82607 VITAMIN B-12: CPT

## 2024-09-16 PROCEDURE — 83550 IRON BINDING TEST: CPT

## 2024-09-16 PROCEDURE — 80061 LIPID PANEL: CPT

## 2024-09-16 PROCEDURE — 82728 ASSAY OF FERRITIN: CPT

## 2024-09-16 PROCEDURE — 99396 PREV VISIT EST AGE 40-64: CPT

## 2024-09-16 PROCEDURE — 83970 ASSAY OF PARATHORMONE: CPT

## 2024-09-16 PROCEDURE — 83540 ASSAY OF IRON: CPT

## 2024-09-16 PROCEDURE — 84630 ASSAY OF ZINC: CPT

## 2024-09-16 PROCEDURE — 80053 COMPREHEN METABOLIC PANEL: CPT

## 2024-09-16 PROCEDURE — 82306 VITAMIN D 25 HYDROXY: CPT

## 2024-09-16 PROCEDURE — 84443 ASSAY THYROID STIM HORMONE: CPT

## 2024-09-16 PROCEDURE — 85027 COMPLETE CBC AUTOMATED: CPT

## 2024-09-16 PROCEDURE — 82746 ASSAY OF FOLIC ACID SERUM: CPT

## 2024-09-16 NOTE — PATIENT INSTRUCTIONS
"Patient Education     Routine physical for adults   The Basics   Written by the doctors and editors at [facility]   What is a physical? -- A physical is a routine visit, or \"check-up,\" with your doctor. You might also hear it called a \"wellness visit\" or \"preventive visit.\"  During each visit, the doctor will:   Ask about your physical and mental health   Ask about your habits, behaviors, and lifestyle   Do an exam   Give you vaccines if needed   Talk to you about any medicines you take   Give advice about your health   Answer your questions  Getting regular check-ups is an important part of taking care of your health. It can help your doctor find and treat any problems you have. But it's also important for preventing health problems.  A routine physical is different from a \"sick visit.\" A sick visit is when you see a doctor because of a health concern or problem. Since physicals are scheduled ahead of time, you can think about what you want to ask the doctor.  How often should I get a physical? -- It depends on your age and health. In general, for people age 21 years and older:   If you are younger than 50 years, you might be able to get a physical every 3 years.   If you are 50 years or older, your doctor might recommend a physical every year.  If you have an ongoing health condition, like diabetes or high blood pressure, your doctor will probably want to see you more often.  What happens during a physical? -- In general, each visit will include:   Physical exam - The doctor or nurse will check your height, weight, heart rate, and blood pressure. They will also look at your eyes and ears. They will ask about how you are feeling and whether you have any symptoms that bother you.   Medicines - It's a good idea to bring a list of all the medicines you take to each doctor visit. Your doctor will talk to you about your medicines and answer any questions. Tell them if you are having any side effects that bother you. You " "should also tell them if you are having trouble paying for any of your medicines.   Habits and behaviors - This includes:   Your diet   Your exercise habits   Whether you smoke, drink alcohol, or use drugs   Whether you are sexually active   Whether you feel safe at home  Your doctor will talk to you about things you can do to improve your health and lower your risk of health problems. They will also offer help and support. For example, if you want to quit smoking, they can give you advice and might prescribe medicines. If you want to improve your diet or get more physical activity, they can help you with this, too.   Lab tests, if needed - The tests you get will depend on your age and situation. For example, your doctor might want to check your:   Cholesterol   Blood sugar   Iron level   Vaccines - The recommended vaccines will depend on your age, health, and what vaccines you already had. Vaccines are very important because they can prevent certain serious or deadly infections.   Discussion of screening - \"Screening\" means checking for diseases or other health problems before they cause symptoms. Your doctor can recommend screening based on your age, risk, and preferences. This might include tests to check for:   Cancer, such as breast, prostate, cervical, ovarian, colorectal, prostate, lung, or skin cancer   Sexually transmitted infections, such as chlamydia and gonorrhea   Mental health conditions like depression and anxiety  Your doctor will talk to you about the different types of screening tests. They can help you decide which screenings to have. They can also explain what the results might mean.   Answering questions - The physical is a good time to ask the doctor or nurse questions about your health. If needed, they can refer you to other doctors or specialists, too.  Adults older than 65 years often need other care, too. As you get older, your doctor will talk to you about:   How to prevent falling at " home   Hearing or vision tests   Memory testing   How to take your medicines safely   Making sure that you have the help and support you need at home  All topics are updated as new evidence becomes available and our peer review process is complete.  This topic retrieved from [employer] on: May 02, 2024.  Topic 635488 Version 1.0  Release: 32.4.3 - C32.122  © 2024 UpToDate, Inc. and/or its affiliates. All rights reserved.  Consumer Information Use and Disclaimer   Disclaimer: This generalized information is a limited summary of diagnosis, treatment, and/or medication information. It is not meant to be comprehensive and should be used as a tool to help the user understand and/or assess potential diagnostic and treatment options. It does NOT include all information about conditions, treatments, medications, side effects, or risks that may apply to a specific patient. It is not intended to be medical advice or a substitute for the medical advice, diagnosis, or treatment of a health care provider based on the health care provider's examination and assessment of a patient's specific and unique circumstances. Patients must speak with a health care provider for complete information about their health, medical questions, and treatment options, including any risks or benefits regarding use of medications. This information does not endorse any treatments or medications as safe, effective, or approved for treating a specific patient. UpToDate, Inc. and its affiliates disclaim any warranty or liability relating to this information or the use thereof.The use of this information is governed by the Terms of Use, available at https://www.[employer].com/en/know/clinical-effectiveness-terms. 2024© UpToDate, Inc. and its affiliates and/or licensors. All rights reserved.  Copyright   © 2024 UpToDate, Inc. and/or its affiliates. All rights reserved.

## 2024-09-16 NOTE — ASSESSMENT & PLAN NOTE
Healthy 48 year old female   Discussed healthy diet and exercise habits  Discussed appropriate age based screenings- CRC screening ordered  Immunizations reviewed- UTD  Routine fasting labs in chart

## 2024-09-16 NOTE — PROGRESS NOTES
Adult Annual Physical  Name: Kellen Garcia      : 1975      MRN: [id number]  Encounter Provider: Essence Darling PA-C  Encounter Date: 2024   Encounter department: [facility]    Assessment & Plan  Screen for colon cancer    Orders:    Ambulatory Referral to Gastroenterology; Future    Annual physical exam  Healthy 48 year old female   Discussed healthy diet and exercise habits  Discussed appropriate age based screenings- CRC screening ordered  Immunizations reviewed- UTD  Routine fasting labs in chart         Hypercholesterolemia  Will get updated lipid panel.  Advised healthy diet and exercise habits  Orders:    Lipid Panel with Direct LDL reflex; Future    Immunizations and preventive care screenings were discussed with patient today. Appropriate education was printed on patient's after visit summary.    Counseling:  Alcohol/drug use: discussed moderation in alcohol intake, the recommendations for healthy alcohol use, and avoidance of illicit drug use.  Dental Health: discussed importance of regular tooth brushing, flossing, and dental visits.  Injury prevention: discussed safety/seat belts, safety helmets, smoke detectors, carbon dioxide detectors, and smoking near bedding or upholstery.  Sexual health: discussed sexually transmitted diseases, partner selection, use of condoms, avoidance of unintended pregnancy, and contraceptive alternatives.  Exercise: the importance of regular exercise/physical activity was discussed. Recommend exercise 3-5 times per week for at least 30 minutes.       Depression Screening and Follow-up Plan: Patient was screened for depression during today's encounter. They screened negative with a PHQ-9 score of 0.        History of Present Illness     Adult Annual Physical:  Patient presents for annual physical. Patient is a 48-year-old female presenting to the office for annual physical  She has no concerns today    Tobacco:  "Denies  Alcohol: Occasionally  Drugs: Denies.     Diet and Physical Activity:  - Diet/Nutrition: well balanced diet, consuming 3-5 servings of fruits/vegetables daily, adequate fiber intake and adequate whole grain intake.  - Exercise: no formal exercise.    Depression Screening:    - PHQ-9 Score: 0    General Health:  - Sleep: sleeps well and 4-6 hours of sleep on average.  - Hearing: normal hearing bilateral ears.  - Vision: wears glasses and goes for regular eye exams.  - Dental: regular dental visits and brushes teeth twice daily.    /GYN Health:  - Follows with GYN: yes.     Review of Systems   Constitutional:  Negative for chills, fatigue and fever.   HENT:  Negative for congestion, ear pain, postnasal drip, rhinorrhea, sinus pressure, sore throat and trouble swallowing.    Eyes:  Negative for pain and visual disturbance.   Respiratory:  Negative for cough, shortness of breath and wheezing.    Cardiovascular:  Negative for chest pain, palpitations and leg swelling.   Gastrointestinal:  Negative for abdominal pain, nausea and vomiting.   Genitourinary:  Negative for difficulty urinating, dysuria and hematuria.   Musculoskeletal:  Negative for arthralgias and back pain.   Skin:  Negative for color change, rash and wound.   Neurological:  Negative for dizziness, weakness, light-headedness, numbness and headaches.   Psychiatric/Behavioral:  Negative for dysphoric mood and sleep disturbance. The patient is not nervous/anxious.    All other systems reviewed and are negative.    Medical History Reviewed by provider this encounter:  Tobacco  Allergies  Meds  Problems  Med Hx  Surg Hx  Fam Hx         Objective     /70 (BP Location: Left arm, Patient Position: Sitting, Cuff Size: Large)   Pulse 64   Temp 97.7 °F (36.5 °C) (Temporal)   Ht 5' 2\" (1.575 m)   Wt 86.6 kg (191 lb)   SpO2 98%   BMI 34.93 kg/m²     Physical Exam  Vitals and nursing note reviewed.   Constitutional:       General: She is not " in acute distress.     Appearance: Normal appearance. She is not ill-appearing.   HENT:      Head: Normocephalic and atraumatic.      Right Ear: Tympanic membrane, ear canal and external ear normal.      Left Ear: Tympanic membrane, ear canal and external ear normal.      Nose: Nose normal.      Mouth/Throat:      Mouth: Mucous membranes are moist.      Pharynx: Oropharynx is clear. No oropharyngeal exudate or posterior oropharyngeal erythema.   Eyes:      General: No scleral icterus.     Extraocular Movements: Extraocular movements intact.      Conjunctiva/sclera: Conjunctivae normal.      Pupils: Pupils are equal, round, and reactive to light.   Neck:      Thyroid: No thyroid mass or thyromegaly.   Cardiovascular:      Rate and Rhythm: Normal rate and regular rhythm.      Heart sounds: Normal heart sounds. No murmur heard.     No friction rub. No gallop.   Pulmonary:      Effort: Pulmonary effort is normal. No respiratory distress.      Breath sounds: Normal breath sounds. No wheezing, rhonchi or rales.   Chest:      Chest wall: No tenderness.   Abdominal:      Palpations: Abdomen is soft.      Tenderness: There is no abdominal tenderness.   Musculoskeletal:         General: Normal range of motion.      Cervical back: Normal range of motion. No tenderness.      Right lower leg: No edema.      Left lower leg: No edema.   Lymphadenopathy:      Cervical: No cervical adenopathy.   Skin:     General: Skin is warm and dry.      Coloration: Skin is not pale.      Findings: No bruising, erythema, lesion or rash.   Neurological:      General: No focal deficit present.      Mental Status: She is alert and oriented to person, place, and time. Mental status is at baseline.      Cranial Nerves: No cranial nerve deficit.      Motor: No weakness.      Coordination: Coordination normal.   Psychiatric:         Mood and Affect: Mood normal.         Behavior: Behavior normal.       Administrative Statements

## 2024-09-16 NOTE — ASSESSMENT & PLAN NOTE
Will get updated lipid panel.  Advised healthy diet and exercise habits  Orders:    Lipid Panel with Direct LDL reflex; Future

## 2024-09-18 LAB — ZINC SERPL-MCNC: 89 UG/DL (ref 44–115)

## 2024-09-20 LAB — VIT B1 BLD-SCNC: 103.8 NMOL/L (ref 66.5–200)

## 2024-09-21 LAB — VIT A SERPL-MCNC: 36.3 UG/DL (ref 20.1–62)

## 2024-09-23 DIAGNOSIS — E55.9 VITAMIN D DEFICIENCY: ICD-10-CM

## 2024-09-23 DIAGNOSIS — E53.8 VITAMIN B12 DEFICIENCY: ICD-10-CM

## 2024-09-23 DIAGNOSIS — Z98.84 BARIATRIC SURGERY STATUS: Primary | ICD-10-CM

## 2024-10-17 ENCOUNTER — TELEPHONE (OUTPATIENT)
Dept: BARIATRICS | Facility: CLINIC | Age: 49
End: 2024-10-17

## 2024-10-17 NOTE — TELEPHONE ENCOUNTER
PA for Wegovy 2.4mg SUBMITTED     via    [x]CMM-KEY: [id number]  []Surescripts-Case ID #    []Availity-Auth ID #  NDC #    []Faxed to plan   []Other website    []Phone call Case ID #      Office notes sent, clinical questions answered. Awaiting determination    Turnaround time for your insurance to make a decision on your Prior Authorization can take 7-21 business days.

## 2024-10-17 NOTE — TELEPHONE ENCOUNTER
PA for Wegovy 2.4mg APPROVED     Date(s) approved 10/17/24-10/17/25    Case #     Patient advised by          []MyChart Message  [x]Phone call   [x]LMOM  []L/M to call office as no active Communication consent on file  []Unable to leave detailed message as VM not approved on Communication consent       Pharmacy advised by    []Fax  [x]Phone call    Approval letter scanned into Media Yes

## 2024-10-31 ENCOUNTER — PREP FOR PROCEDURE (OUTPATIENT)
Age: 49
End: 2024-10-31

## 2024-10-31 ENCOUNTER — TELEPHONE (OUTPATIENT)
Age: 49
End: 2024-10-31

## 2024-10-31 DIAGNOSIS — Z12.11 SCREENING FOR COLON CANCER: Primary | ICD-10-CM

## 2024-10-31 NOTE — TELEPHONE ENCOUNTER
10/31/24  Screened by: Anna Gonsalves    Referring Provider LINDA RAJPUT     Pre- Screening:     Z12.11 (ICD-10-CM) - Screen for colon cancer       There is no height or weight on file to calculate BMI.34.4  Weight 188  Height-5'2  Has patient been referred for a routine screening Colonoscopy? yes  Is the patient between 45-75 years old? yes      Previous Colonoscopy no   If yes:    Date:     Facility:     Reason:     Does the patient want to see a Gastroenterologist prior to their procedure OR are they having any GI symptoms? no    Has the patient been hospitalized or had abdominal surgery in the past 6 months? no    Does the patient use supplemental oxygen? no    Does the patient take Coumadin, Lovenox, Plavix, Elliquis, Xarelto, or other blood thinning medication? no    Has the patient had a stroke, cardiac event, or stent placed in the past year? no    If patient is between 45yrs - 49yrs, please advise patient that we will have to confirm benefits & coverage with their insurance company for a routine screening colonoscopy.

## 2024-10-31 NOTE — TELEPHONE ENCOUNTER
Scheduled date of colonoscopy (as of today):12/2/2024  Physician performing colonoscopy:Dr. Pickering  Location of colonoscopy:[facility]  Bowel prep reviewed with patient:Keith/Dul  Instructions reviewed with patient by:ABHISHEK-Keith/Dul prep instructions sent to [facility]  Clearances: n/a

## 2024-10-31 NOTE — LETTER
Hello,    Attached are your prep instructions for your upcoming procedure on 12/2/24. If you have any questions, please give us a call at [phone].    Thank you,    [facility]  Medicine Instructions for Adults with Diabetes who Need a Bowel Prep       Follow these instructions when a BOWEL PREP is required for your procedure or surgery!    NOTE:   GLP-1 Agonists taken weekly: do not take in the 7 days before your procedure   SGLT-2 Inhibitors: do not take in the 4 days before your procedure     On the Day Before Surgery/Procedure  If you are having a procedure (e.g. Colonoscopy) or surgery that requires a bowel prep and you may have at least a clear liquid diet, follow the directions below based on the type of medicine you take for your diabetes.     Type of Medicine You Take Examples What to do   Pre-Mixed Insulin - Intermediate Acting Humalog® 75/25, Humulin® 70/30, Novolog® 70/30, Regular Insulin Take ½ your regular dose the evening before your procedure   Rapid/Fast Acting Insulin Humalog® U200, NovoLog®, Apidra®, Fiasp® Take ½ your regular dose the evening before your procedure.   Long-Acting Insulin Lantus®, Levemir®, Tresiba®, Toujeo®, Basaglar® Take your FULL regular dose the day before procedure   Oral Sulfonylurea Glipizide/Glimepiride/Glucotrol® Take ½ your regular dose the evening before your procedure   Other Oral Diabetes Medicines Metformin®, Glucophage®, Glucophage XR®, Riomet®, Glumetza®), Actose®, Avandia®, Glyset®, Prandin® Take your regular dose with dinner in the evening before your procedure   GLP-1 Agonists AdlyxinÒ, ByettaÒ, BydureonÒ, OzempicÒ, SoliquaÒ, TanzeumÒ, TrulicityÒ, VictozaÒ, Saxenda®, Rybelsus® If taken daily, take as normal    If taken weekly, do not take this medicine for 7 days before your procedure including the day of the procedure (resume taking after the procedure)   SGLT-2 Inhibitors Jardiance®, Invokana®, Farxiga®,    Steglatro®, Brenzavvy®, Qtern®, Segluromet®, Glyxambi®, Synjardy®, Synjardy XR®, Invokamet®, Invokamet XR®, Trijary XR®, Xigduo XR®, Steglujan® Do not take for 4 days before your procedure including the day of the procedure (resume taking after the procedure)                More information continued on back                    Medicine Instructions for Adults with Diabetes who Need a Bowel Prep  Page 2      On the Day of Surgery/Procedure  Follow the directions below based on the type of medicine you take for your diabetes.     Type of Medicine You Take Examples What to do   Long-Acting Insulin Lantus®, Levemir®, Tresiba®, Toujeo®, Basaglar®, Semglee®   If you usually take your Long-Acting Insulin in the morning, take the full dose as scheduled.   GLP-1 Agonists AdlyxinÒ, ByettaÒ, BydureonÒ, OzempicÒ, SoliquaÒ, TanzeumÒ, TrulicityÒ, VictozaÒ, Saxenda®, Rybelsus® Do NOT take this medicine on the day of your procedure (resume taking after the procedure)       On the Day of Surgery/Procedure (continued)  Except for the morning Long-Acting Insulin, DO NOT take ANY diabetic medicine on the day of your procedure unless you were instructed by the doctor who manages your diabetes medicines.    Continue to check your blood sugars.  If you have an insulin pump, ask your endocrinologist for instructions at least 3 days before your procedure. NOTE: If you are not able to ask your endocrinologist in advance, on the day of the procedure set your insulin pump to your basal rate only. Bring your insulin pump supplies to the hospital.     If you have any questions about taking your diabetes medicines prior to your procedure, please contact the doctor who manages your diabetes medicines.

## 2024-11-15 ENCOUNTER — ANESTHESIA (OUTPATIENT)
Dept: ANESTHESIOLOGY | Facility: HOSPITAL | Age: 49
End: 2024-11-15

## 2024-11-15 ENCOUNTER — ANESTHESIA EVENT (OUTPATIENT)
Dept: ANESTHESIOLOGY | Facility: HOSPITAL | Age: 49
End: 2024-11-15

## 2024-11-18 ENCOUNTER — OFFICE VISIT (OUTPATIENT)
Dept: BARIATRICS | Facility: CLINIC | Age: 49
End: 2024-11-18
Payer: COMMERCIAL

## 2024-11-18 ENCOUNTER — TELEPHONE (OUTPATIENT)
Dept: GASTROENTEROLOGY | Facility: MEDICAL CENTER | Age: 49
End: 2024-11-18

## 2024-11-18 VITALS
DIASTOLIC BLOOD PRESSURE: 87 MMHG | SYSTOLIC BLOOD PRESSURE: 116 MMHG | WEIGHT: 189 LBS | HEIGHT: 62 IN | HEART RATE: 68 BPM | BODY MASS INDEX: 34.78 KG/M2 | OXYGEN SATURATION: 98 % | TEMPERATURE: 97.8 F

## 2024-11-18 DIAGNOSIS — E66.811 OBESITY, CLASS I, BMI 30-34.9: Primary | ICD-10-CM

## 2024-11-18 DIAGNOSIS — E66.812 OBESITY, CLASS II, BMI 35-39.9: ICD-10-CM

## 2024-11-18 DIAGNOSIS — E78.00 HYPERCHOLESTEROLEMIA: ICD-10-CM

## 2024-11-18 PROCEDURE — 99214 OFFICE O/P EST MOD 30 MIN: CPT | Performed by: PHYSICIAN ASSISTANT

## 2024-11-18 RX ORDER — SEMAGLUTIDE 2.4 MG/.75ML
2.4 INJECTION, SOLUTION SUBCUTANEOUS WEEKLY
Qty: 3 ML | Refills: 5 | Status: SHIPPED | OUTPATIENT
Start: 2024-11-18

## 2024-11-18 NOTE — ASSESSMENT & PLAN NOTE
Done 9/16 and still elevated. Has low ASCVD score however.    -should improve with weight loss, dietary, and lifestyle changes

## 2024-11-18 NOTE — ASSESSMENT & PLAN NOTE
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   - Initial weight loss goal of 5-10% weight loss for improved health  -labs reviewed from September and up to date.  Taking bariatric MVI    Currently on wegovy 2.4mg. Started saxenda initially at a weight of 227.4 and then transitioned to wegovy at weight of 215lb  .     - Medication contract signed May 2023.Advised to use some form of birth control while on weight loss medication.     Initial: 226.8 lbs BMI 40.18  Last visit: 198.1 lbs BMI 36.25  Current: 189lb  Change: -37.8 lbs (-9.1 lbs since the last office visit)  Goal: 180 Lbs      Goals:  To monitor water intake and get at least 60 oz daily  Recommend to start strength training exercises.    Continue with dietary changes

## 2024-11-18 NOTE — TELEPHONE ENCOUNTER
Confirming Upcoming Procedure: Colonoscopy on December 2  Physician performing: Dr. Pickering  Location of procedure:  [facility]  Prep: Miralax  Diabetic: Wegovy-hold for 7 days prior to procedure

## 2024-11-18 NOTE — PROGRESS NOTES
Assessment/Plan:    Obesity, Class I, BMI 30-34.9  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   - Initial weight loss goal of 5-10% weight loss for improved health  -labs reviewed from September and up to date.  Taking bariatric MVI    Currently on wegovy 2.4mg. Started saxenda initially at a weight of 227.4 and then transitioned to wegovy at weight of 215lb  .     - Medication contract signed May 2023.Advised to use some form of birth control while on weight loss medication.     Initial: 226.8 lbs BMI 40.18  Last visit: 198.1 lbs BMI 36.25  Current: 189lb  Change: -37.8 lbs (-9.1 lbs since the last office visit)  Goal: 180 Lbs      Goals:  To monitor water intake and get at least 60 oz daily  Recommend to start strength training exercises.    Continue with dietary changes    Hypercholesterolemia  Done 9/16 and still elevated. Has low ASCVD score however.    -should improve with weight loss, dietary, and lifestyle changes        Return in about 6 months (around 5/20/2025) for w/ tarsha.       Diagnoses and all orders for this visit:    Obesity, Class I, BMI 30-34.9    Obesity, Class II, BMI 35-39.9  -     Semaglutide-Weight Management (Wegovy) 2.4 MG/0.75ML; Inject 0.75 mL (2.4 mg total) under the skin once a week    Hypercholesterolemia          Subjective:   Chief Complaint   Patient presents with    Follow-up     6MO MWM FUP        Patient ID: Kellen Garcia  is a 49 y.o. female with excess weight/obesity here to pursue weight managment.  Patient is pursuing Conservative Program.     HPI  On wegovy 2.4mg.  denies any side effects.  It has helped with appetite control.    Wt Readings from Last 10 Encounters:   11/18/24 85.7 kg (189 lb)   09/16/24 86.6 kg (191 lb)   08/05/24 86.7 kg (191 lb 3.2 oz)   05/28/24 89.9 kg (198 lb 3.2 oz)   02/19/24 90.7 kg  (200 lb)   02/05/24 92.9 kg (204 lb 12.8 oz)   01/04/24 93 kg (205 lb)   11/14/23 96.3 kg (212 lb 3.2 oz)   09/12/23 97.5 kg (215 lb)   07/18/23 98 kg (216 lb)       Food logging:  Increased appetite/cravings:  Exercise:works 2 jobs so on her feet but not always getting formal exercise  Hydration:water varies-sometiems less than 60 oz     Diet-usually 3 meals a week. Doing well with protein intake      The following portions of the patient's history were reviewed and updated as appropriate: She  has a past medical history of Anemia, Bariatric surgery status, CPAP (continuous positive airway pressure) dependence, Depression, GERD (gastroesophageal reflux disease), History of anxiety, Left knee pain, Low back pain, Morbid obesity (HCC), Motion sickness, Obesity, PONV (postoperative nausea and vomiting), Postgastrectomy malabsorption, Prediabetes, Right ankle pain, Shortness of breath, Sleep apnea, Walks frequently, and Wears glasses.  She   Patient Active Problem List    Diagnosis Date Noted    Poor venous access 06/16/2022    Annual physical exam 03/11/2021    Vitamin D deficiency 05/29/2019    Gastroesophageal reflux disease without esophagitis 09/12/2018    Postsurgical malabsorption 08/29/2018    Iron deficiency 08/29/2018    Bariatric surgery status 06/20/2018    Obesity, Class I, BMI 30-34.9 04/16/2018    Anxiety disorder 10/22/2014    Depression 10/22/2014    Hypercholesterolemia 10/30/2013     She  has a past surgical history that includes Anterior cruciate ligament repair (Left); Ankle surgery (Bilateral); Knee cartilage surgery; pr egd transoral biopsy single/multiple (N/A, 03/21/2018); Orcas tooth extraction; pr laps gstrc rstrictiv px longitudinal gastrectomy (N/A, 05/08/2018); Bariatric Surgery (05/08/2018); Knee surgery (Left); Reduction mammaplasty (Bilateral); and Sleeve Gastroplasty.  Her family history includes Diabetes type II in her father and mother; Heart attack in her mother; Heart disease in  her mother; Hypertension in her father and mother; No Known Problems in her maternal grandfather, maternal grandmother, paternal aunt, paternal grandfather, paternal grandmother, sister, and sister.  She  reports that she has never smoked. She has never used smokeless tobacco. She reports that she does not currently use alcohol. She reports that she does not use drugs.  Current Outpatient Medications   Medication Sig Dispense Refill    CALCIUM PO Take 1 each by mouth 3 (three) times a day      Diclofenac Sodium (VOLTAREN) 1 % Apply 2 g topically 4 (four) times a day 50 g 1    Multiple Vitamin (MULTIVITAMIN) tablet Take 1 tablet by mouth daily      omeprazole (PriLOSEC) 20 mg delayed release capsule Take 1 capsule (20 mg total) by mouth daily 30 capsule 2    Semaglutide-Weight Management (Wegovy) 2.4 MG/0.75ML Inject 0.75 mL (2.4 mg total) under the skin once a week 3 mL 5     No current facility-administered medications for this visit.     Current Outpatient Medications on File Prior to Visit   Medication Sig    CALCIUM PO Take 1 each by mouth 3 (three) times a day    Diclofenac Sodium (VOLTAREN) 1 % Apply 2 g topically 4 (four) times a day    Multiple Vitamin (MULTIVITAMIN) tablet Take 1 tablet by mouth daily    omeprazole (PriLOSEC) 20 mg delayed release capsule Take 1 capsule (20 mg total) by mouth daily    [DISCONTINUED] Semaglutide-Weight Management (Wegovy) 2.4 MG/0.75ML Inject 0.75 mL (2.4 mg total) under the skin once a week     No current facility-administered medications on file prior to visit.     She is allergic to nsaids..    Review of Systems   Constitutional:  Negative for fever.   Respiratory:  Negative for shortness of breath.    Cardiovascular:  Negative for chest pain and palpitations.   Gastrointestinal:  Negative for abdominal pain, constipation, diarrhea and vomiting.   Genitourinary:  Negative for difficulty urinating.   Skin:  Negative for rash.   Neurological:  Negative for headaches.  "  Psychiatric/Behavioral:  Negative for dysphoric mood. The patient is not nervous/anxious.        Objective:    /87 (BP Location: Left arm, Patient Position: Sitting, Cuff Size: Adult)   Pulse 68   Temp 97.8 °F (36.6 °C) (Tympanic)   Ht 5' 2\" (1.575 m)   Wt 85.7 kg (189 lb)   SpO2 98%   BMI 34.57 kg/m²      Physical Exam  Vitals and nursing note reviewed.   Constitutional:       General: She is not in acute distress.     Appearance: She is well-developed. She is obese.   HENT:      Head: Normocephalic and atraumatic.   Eyes:      Conjunctiva/sclera: Conjunctivae normal.   Neck:      Thyroid: No thyromegaly.   Pulmonary:      Effort: Pulmonary effort is normal. No respiratory distress.   Skin:     Findings: No rash (visible).   Neurological:      Mental Status: She is alert and oriented to person, place, and time.   Psychiatric:         Mood and Affect: Mood normal.         Behavior: Behavior normal.         "

## 2024-11-20 NOTE — TELEPHONE ENCOUNTER
Left voicemail, let [facility] know [facility] to review and requested call back to confirm/with any questions regarding prep/or need to reschedule

## 2024-12-02 ENCOUNTER — ANESTHESIA EVENT (OUTPATIENT)
Dept: GASTROENTEROLOGY | Facility: MEDICAL CENTER | Age: 49
End: 2024-12-02
Payer: COMMERCIAL

## 2024-12-02 ENCOUNTER — HOSPITAL ENCOUNTER (OUTPATIENT)
Dept: GASTROENTEROLOGY | Facility: MEDICAL CENTER | Age: 49
Setting detail: OUTPATIENT SURGERY
Discharge: HOME | End: 2024-12-02
Payer: COMMERCIAL

## 2024-12-02 ENCOUNTER — ANESTHESIA (OUTPATIENT)
Dept: GASTROENTEROLOGY | Facility: MEDICAL CENTER | Age: 49
End: 2024-12-02
Payer: COMMERCIAL

## 2024-12-02 VITALS
DIASTOLIC BLOOD PRESSURE: 77 MMHG | HEIGHT: 62 IN | RESPIRATION RATE: 15 BRPM | SYSTOLIC BLOOD PRESSURE: 122 MMHG | OXYGEN SATURATION: 100 % | BODY MASS INDEX: 34.78 KG/M2 | HEART RATE: 72 BPM | WEIGHT: 189 LBS | TEMPERATURE: 98.6 F

## 2024-12-02 DIAGNOSIS — Z12.11 SCREENING FOR COLON CANCER: ICD-10-CM

## 2024-12-02 PROCEDURE — G0121 COLON CA SCRN NOT HI RSK IND: HCPCS | Performed by: STUDENT IN AN ORGANIZED HEALTH CARE EDUCATION/TRAINING PROGRAM

## 2024-12-02 RX ORDER — PROPOFOL 10 MG/ML
INJECTION, EMULSION INTRAVENOUS CONTINUOUS PRN
Status: DISCONTINUED | OUTPATIENT
Start: 2024-12-02 | End: 2024-12-02

## 2024-12-02 RX ORDER — SODIUM CHLORIDE 9 MG/ML
INJECTION, SOLUTION INTRAVENOUS CONTINUOUS PRN
Status: DISCONTINUED | OUTPATIENT
Start: 2024-12-02 | End: 2024-12-02

## 2024-12-02 RX ORDER — PROPOFOL 10 MG/ML
INJECTION, EMULSION INTRAVENOUS AS NEEDED
Status: DISCONTINUED | OUTPATIENT
Start: 2024-12-02 | End: 2024-12-02

## 2024-12-02 RX ADMIN — Medication 40 MG: at 12:34

## 2024-12-02 RX ADMIN — PROPOFOL 100 MCG/KG/MIN: 10 INJECTION, EMULSION INTRAVENOUS at 12:28

## 2024-12-02 RX ADMIN — SODIUM CHLORIDE: 0.9 INJECTION, SOLUTION INTRAVENOUS at 12:10

## 2024-12-02 RX ADMIN — PROPOFOL 120 MG: 10 INJECTION, EMULSION INTRAVENOUS at 12:28

## 2024-12-02 NOTE — H&P
"History and Physical - [facility]  Kellen Garcia 49 y.o. female MRN: [id number]          HPI: Kellen Garcia is a 49 y.o. year old female who presents for open access initial screening colonsocopy.      REVIEW OF SYSTEMS: Per the HPI, and otherwise unremarkable.    Historical Information   Past Medical History:   Diagnosis Date    Anemia     Bariatric surgery status     CPAP (continuous positive airway pressure) dependence     Depression     GERD (gastroesophageal reflux disease)     History of anxiety     Left knee pain     \"occas\"    Low back pain     \"mayra if standing for a while\"    Morbid obesity (HCC)     Motion sickness     Obesity     PONV (postoperative nausea and vomiting)     Postgastrectomy malabsorption     Prediabetes     Right ankle pain     \" occas\"    Shortness of breath     \"exertional\"    Sleep apnea     Mild    Walks frequently     for exercise    Wears glasses      Past Surgical History:   Procedure Laterality Date    ANKLE SURGERY Bilateral     Right with screws and plate implant. Left ACL repair Resolved: 1997    ANTERIOR CRUCIATE LIGAMENT REPAIR Left     screws implanted    BARIATRIC SURGERY  05/08/2018    KNEE CARTILAGE SURGERY      right    KNEE SURGERY Left     NE EGD TRANSORAL BIOPSY SINGLE/MULTIPLE N/A 03/21/2018    Procedure: ESOPHAGOGASTRODUODENOSCOPY (EGD) with bx;  Surgeon: Rex Orantes MD;  Location: [facility];  Service: Bariatrics    NE LAPS GSTRC RSTRICTIV PX LONGITUDINAL GASTRECTOMY N/A 05/08/2018    Procedure: LAPAROSCOPIC SLEEVE GASTRECTOMY WITH ROBOTICS;  Surgeon: Rex Orantes MD;  Location: [facility];  Service: Bariatrics    REDUCTION MAMMAPLASTY Bilateral     Resolved: 2004    SLEEVE GASTROPLASTY      WISDOM TOOTH EXTRACTION       Social History   Social History     Substance and Sexual Activity   Alcohol Use Not Currently    Comment: social      Social History     Substance and Sexual Activity   Drug Use No     Social History     Tobacco " Use   Smoking Status Never   Smokeless Tobacco Never     Family History   Problem Relation Age of Onset    Heart disease Mother     Hypertension Mother     Heart attack Mother     Diabetes type II Mother         Mellitus    Hypertension Father     Diabetes type II Father         Mellitus    No Known Problems Sister     No Known Problems Sister     No Known Problems Paternal Aunt     No Known Problems Maternal Grandmother     No Known Problems Maternal Grandfather     No Known Problems Paternal Grandmother     No Known Problems Paternal Grandfather     Cancer Neg Hx     Stroke Neg Hx     Thyroid disease Neg Hx        Meds/Allergies       Current Outpatient Medications:     CALCIUM PO    Diclofenac Sodium (VOLTAREN) 1 %    Multiple Vitamin (MULTIVITAMIN) tablet    omeprazole (PriLOSEC) 20 mg delayed release capsule    Semaglutide-Weight Management (Wegovy) 2.4 MG/0.75ML    Allergies   Allergen Reactions    Nsaids GI Intolerance       Objective     There were no vitals taken for this visit.      PHYSICAL EXAM    GEN: NAD  CARDIO: RRR  PULM: CTA bilaterally  ABD: soft, non-tender, non-distended  EXT: no lower extremity edema  NEURO: AAOx3      ASSESSMENT/PLAN:  49 y.o. year old female here for colonoscopy; she is stable and optimized for her procedure.

## 2024-12-02 NOTE — ANESTHESIA POSTPROCEDURE EVALUATION
Post-Op Assessment Note    CV Status:  Stable    Pain management: adequate       Mental Status:  Alert and awake   Hydration Status:  Euvolemic   PONV Controlled:  Controlled   Airway Patency:  Patent     Post Op Vitals Reviewed: Yes    No anethesia notable event occurred.    Staff: CRNA           Last Filed PACU Vitals:  Vitals Value Taken Time   Temp 97.9    Pulse 70    /56    Resp 18    SpO2 99        Modified Milrded:  Activity: 2 (12/2/2024 11:42 AM)  Respiration: 2 (12/2/2024 11:42 AM)  Circulation: 2 (12/2/2024 11:42 AM)  Consciousness: 2 (12/2/2024 11:42 AM)  Oxygen Saturation: 2 (12/2/2024 11:42 AM)  Modified Mildred Score: 10 (12/2/2024 11:42 AM)

## 2024-12-02 NOTE — ANESTHESIA PREPROCEDURE EVALUATION
Procedure:  COLONOSCOPY    Relevant Problems   CARDIO   (+) Hypercholesterolemia      GI/HEPATIC   (+) Gastroesophageal reflux disease without esophagitis      NEURO/PSYCH   (+) Anxiety disorder   (+) Depression        Physical Exam    Airway    Mallampati score: II  TM Distance: >3 FB  Neck ROM: full     Dental   No notable dental hx     Cardiovascular  Rhythm: regular, No weak pulses    Pulmonary   No stridor    Other Findings  post-pubertal.      Anesthesia Plan  ASA Score- 2     Anesthesia Type- IV sedation with anesthesia with ASA Monitors.         Additional Monitors:     Airway Plan:            Plan Factors-    Chart reviewed.   Existing labs reviewed. Patient summary reviewed.                  Induction- intravenous.    Postoperative Plan-         Informed Consent- Anesthetic plan and risks discussed with patient.  I personally reviewed this patient with the CRNA. Discussed and agreed on the Anesthesia Plan with the CRNA..         Pt refusing 911 transport to hospital - pt called family and they will be here in 20-30 min. MD aware.

## 2024-12-12 ENCOUNTER — VBI (OUTPATIENT)
Dept: ADMINISTRATIVE | Facility: OTHER | Age: 49
End: 2024-12-12

## 2024-12-12 NOTE — TELEPHONE ENCOUNTER
12/12/24 9:18 AM     Chart reviewed for [facility]: Colonoscopy ; nothing is submitted to the patient's insurance at this time.     Pal Cohen MA   [facility]

## 2025-01-17 ENCOUNTER — VBI (OUTPATIENT)
Dept: ADMINISTRATIVE | Facility: OTHER | Age: 50
End: 2025-01-17

## 2025-01-17 NOTE — TELEPHONE ENCOUNTER
01/17/25 3:01 PM     Chart reviewed for [facility]: Colonoscopy was/were submitted to the patient's insurance.     Pal Cohen MA   [facility]

## 2025-02-03 ENCOUNTER — HOSPITAL ENCOUNTER (OUTPATIENT)
Dept: RADIOLOGY | Facility: IMAGING CENTER | Age: 50
Discharge: HOME | End: 2025-02-03
Payer: COMMERCIAL

## 2025-02-03 VITALS — WEIGHT: 190 LBS | BODY MASS INDEX: 34.96 KG/M2 | HEIGHT: 62 IN

## 2025-02-03 DIAGNOSIS — Z12.31 SCREENING MAMMOGRAM FOR BREAST CANCER: ICD-10-CM

## 2025-02-03 PROCEDURE — 77063 BREAST TOMOSYNTHESIS BI: CPT

## 2025-02-03 PROCEDURE — 77067 SCR MAMMO BI INCL CAD: CPT

## 2025-02-19 ENCOUNTER — OFFICE VISIT (OUTPATIENT)
Dept: BARIATRICS | Facility: CLINIC | Age: 50
End: 2025-02-19
Payer: COMMERCIAL

## 2025-02-19 ENCOUNTER — PREP FOR PROCEDURE (OUTPATIENT)
Dept: BARIATRICS | Facility: CLINIC | Age: 50
End: 2025-02-19

## 2025-02-19 VITALS
BODY MASS INDEX: 35.33 KG/M2 | DIASTOLIC BLOOD PRESSURE: 84 MMHG | SYSTOLIC BLOOD PRESSURE: 128 MMHG | HEIGHT: 62 IN | TEMPERATURE: 98.5 F | OXYGEN SATURATION: 98 % | WEIGHT: 192 LBS | HEART RATE: 83 BPM

## 2025-02-19 DIAGNOSIS — Z98.84 BARIATRIC SURGERY STATUS: Primary | ICD-10-CM

## 2025-02-19 DIAGNOSIS — E66.812 OBESITY, CLASS II, BMI 35-39.9: ICD-10-CM

## 2025-02-19 DIAGNOSIS — K91.2 POSTSURGICAL MALABSORPTION: ICD-10-CM

## 2025-02-19 DIAGNOSIS — E56.9 INADEQUATE VITAMIN INTAKE: ICD-10-CM

## 2025-02-19 DIAGNOSIS — Z48.815 ENCOUNTER FOR SURGICAL AFTERCARE FOLLOWING SURGERY OF DIGESTIVE SYSTEM: Primary | ICD-10-CM

## 2025-02-19 DIAGNOSIS — Z98.84 BARIATRIC SURGERY STATUS: ICD-10-CM

## 2025-02-19 PROCEDURE — 99214 OFFICE O/P EST MOD 30 MIN: CPT | Performed by: NURSE PRACTITIONER

## 2025-02-19 NOTE — PROGRESS NOTES
Assessment/Plan:     Patient ID: Kellen Garcia is a 49 y.o. female.     Bariatric Surgery Status/BMI 34    -s/p Vertical Sleeve Gastrectomy with Dr. Nikhil Orantes on 05/08/2018. Presents to the office today for annual visit. Overall doing well - follows with our MWM program and is currently taking wegovy 2.4 mg/week. Tolerating well without any issues. She denies having any abdominal pain, N/V/D/C, regurgitation, reflux or dysphagia. Taking her multivitamins daily.     PLAN:     - EGD screening due - will schedule and f/u with AP to review.   - continue with wegovy for now. Follow up in 3 months for MWM f/u.   - Routine follow up in 1 year for annual visit  - Continue with healthy lifestyle, adequate protein intake of 60 gm, fluid intake of at least 64 oz.   - Continue with MVI daily. Recommended to be consistent; obtain labs in 2 months  - Activity as tolerated.   - Labs ordered and will adjust accordingly if any deficiency.   - Follow up with RD and SW as needed.       Continued/Maintain healthy weight loss with good nutrition intakes.  Adequate hydration with at least 64oz. fluid intake.  Follow diet as discussed.  Follow vitamin and mineral recommendations as reviewed with you.  Exercise as tolerated.    Colonoscopy referral made: utd - due in 2034  Mammogram - UTD  EGD screening - due - will schedule     Follow-up in 1 year for annual visit. We kindly ask that your arrive 15 minutes before your scheduled appointment time with your provider to allow our staff to room you, get your vital signs and update your chart.    Get lab work done prior to annual visit. Please call the office if you need a script.  It is recommended to check with your insurance BEFORE getting labs done to make sure they are covered by your policy.      Call our office if you have any problems with abdominal pain especially associated with fever, chills, nausea, vomiting or any other concerns.    All  Post-bariatric surgery patients should  be aware that very small quantities of any alcohol can cause impairment and it is very possible not to feel the effect. The effect can be in the system for several hours.  It is also a stomach irritant.     It is advised to AVOID alcohol, Nonsteroidal antiinflammatory drugs (NSAIDS) and nicotine of all forms . Any of these can cause stomach irritation/pain.    Discussed the effects of alcohol on a bariatric patient and the increased impairment risk.     Keep up the good work!     Postsurgical Malabsorption /Inadequate vitamin intake.   -At risk for malabsorption of vitamins/minerals secondary to malabsorption and restriction of intake from bariatric surgery  -not Currently taking adequate postop bariatric surgery vitamin supplementation  -Last set of bariatric labs completed on 09/16/2024 and showed vitamin D deficiency, high PTH, vitamin B12 deficiency  -Next set of bariatric labs ordered for approximately 2 weeks  -Patient received education about the importance of adhering to a lifelong supplementation regimen to avoid vitamin/mineral deficiencies      Diagnoses and all orders for this visit:    Encounter for surgical aftercare following surgery of digestive system  -     CBC; Future  -     Comprehensive metabolic panel; Future  -     Folate; Future  -     Iron Panel (Includes Ferritin, Iron Sat%, Iron, and TIBC); Future  -     PTH, intact; Future  -     Vitamin A; Future  -     Vitamin B1, whole blood; Future  -     Vitamin B12; Future  -     Vitamin D 25 hydroxy; Future  -     Zinc; Future  -     Methylmalonic acid, serum; Future    Bariatric surgery status  -     CBC; Future  -     Comprehensive metabolic panel; Future  -     Folate; Future  -     Iron Panel (Includes Ferritin, Iron Sat%, Iron, and TIBC); Future  -     PTH, intact; Future  -     Vitamin A; Future  -     Vitamin B1, whole blood; Future  -     Vitamin B12; Future  -     Vitamin D 25 hydroxy; Future  -     Zinc; Future  -     Methylmalonic acid,  serum; Future    Postsurgical malabsorption  -     CBC; Future  -     Comprehensive metabolic panel; Future  -     Folate; Future  -     Iron Panel (Includes Ferritin, Iron Sat%, Iron, and TIBC); Future  -     PTH, intact; Future  -     Vitamin A; Future  -     Vitamin B1, whole blood; Future  -     Vitamin B12; Future  -     Vitamin D 25 hydroxy; Future  -     Zinc; Future  -     Methylmalonic acid, serum; Future    Obesity, Class II, BMI 35-39.9  -     CBC; Future  -     Comprehensive metabolic panel; Future  -     Folate; Future  -     Iron Panel (Includes Ferritin, Iron Sat%, Iron, and TIBC); Future  -     PTH, intact; Future  -     Vitamin A; Future  -     Vitamin B1, whole blood; Future  -     Vitamin B12; Future  -     Vitamin D 25 hydroxy; Future  -     Zinc; Future  -     Methylmalonic acid, serum; Future    BMI 35.0-35.9,adult  -     CBC; Future  -     Comprehensive metabolic panel; Future  -     Folate; Future  -     Iron Panel (Includes Ferritin, Iron Sat%, Iron, and TIBC); Future  -     PTH, intact; Future  -     Vitamin A; Future  -     Vitamin B1, whole blood; Future  -     Vitamin B12; Future  -     Vitamin D 25 hydroxy; Future  -     Zinc; Future  -     Methylmalonic acid, serum; Future    Inadequate vitamin intake  -     CBC; Future  -     Comprehensive metabolic panel; Future  -     Folate; Future  -     Iron Panel (Includes Ferritin, Iron Sat%, Iron, and TIBC); Future  -     PTH, intact; Future  -     Vitamin A; Future  -     Vitamin B1, whole blood; Future  -     Vitamin B12; Future  -     Vitamin D 25 hydroxy; Future  -     Zinc; Future  -     Methylmalonic acid, serum; Future         Subjective:      Patient ID: Kellen Garcia is a 49 y.o. female.    -s/p Vertical Sleeve Gastrectomy with Dr. Nikhil Orantes on 05/08/2018. Presents to the office today for annual visit. Overall doing well - follows with our MWM program and is currently taking wegovy 2.4 mg/week. Tolerating well without any issues.  "She denies having any abdominal pain, N/V/D/C, regurgitation, reflux or dysphagia. Taking her multivitamins daily.     Initial: 246.5 lbs  Current:  192 LBS  EWL: (Weight loss is ahead of schedule at this post surgical period.)  Gary 169 lbs  Current BMI is Body mass index is 35.12 kg/m².    Tolerating a regular diet-yes  Eating at least 60 grams of protein per day-yes  Following 30/60 minute rule with liquids-yes - does 30/30 minute rule.   Drinking at least 64 ounces of fluid per day- No working on this.  Drinking carbonated beverages- no   Sufficient exercise-yes - no formal activity; works two jobs.   Using NSAIDs regularly-no  Using nicotine-no  Using alcohol-no  Supplements:  Multivitamins, Calcium , and vitamin D 3 - but takes vitamin D once a week - not consistent     EWL is 52%, which places the patient ahead of schedule for expected post surgical weight loss at this time.     The following portions of the patient's history were reviewed and updated as appropriate: allergies, current medications, past family history, past medical history, past social history, past surgical history and problem list.    Review of Systems   Constitutional: Negative.    Respiratory: Negative.     Cardiovascular: Negative.    Gastrointestinal: Negative.    Musculoskeletal: Negative.    Neurological: Negative.    Psychiatric/Behavioral: Negative.           Objective:    /84 (BP Location: Left arm, Patient Position: Sitting, Cuff Size: Adult)   Pulse 83   Temp 98.5 °F (36.9 °C) (Tympanic)   Ht 5' 2\" (1.575 m)   Wt 87.1 kg (192 lb)   LMP 04/24/2024   SpO2 98%   BMI 35.12 kg/m²      Physical Exam  Vitals and nursing note reviewed.   Constitutional:       Appearance: Normal appearance. She is obese.   Cardiovascular:      Rate and Rhythm: Normal rate and regular rhythm.      Pulses: Normal pulses.      Heart sounds: Normal heart sounds.   Pulmonary:      Effort: Pulmonary effort is normal.      Breath sounds: Normal " breath sounds.   Abdominal:      General: Bowel sounds are normal.      Palpations: Abdomen is soft.      Tenderness: There is no abdominal tenderness.   Musculoskeletal:         General: Normal range of motion.   Skin:     General: Skin is warm and dry.   Neurological:      General: No focal deficit present.      Mental Status: She is alert and oriented to person, place, and time.   Psychiatric:         Mood and Affect: Mood normal.         Behavior: Behavior normal.         Thought Content: Thought content normal.         Judgment: Judgment normal.

## 2025-02-19 NOTE — PROGRESS NOTES
Date of surgery: 5/8/2018  Procedure: Sleeve   Performing surgeon: Dr. Nikhil Orantes     Initial Weight -  246.5 lb   Current Weight - 192.0 lb   Gary Weight - 189.0 lb   Total Body Weight Loss (EWL)-  54.5  EWL% - 52%  TWB % - 22%

## 2025-04-14 ENCOUNTER — TELEPHONE (OUTPATIENT)
Dept: BARIATRICS | Facility: CLINIC | Age: 50
End: 2025-04-14

## 2025-04-14 NOTE — TELEPHONE ENCOUNTER
LVM for pt discussing EGD instructions for a procedure on 4/23/25 with Dr. Nikhil Orantes. Pt advised to call office if any questions or concerns. Pt also sent MyChart of instructions.

## 2025-04-22 RX ORDER — SODIUM CHLORIDE, SODIUM LACTATE, POTASSIUM CHLORIDE, CALCIUM CHLORIDE 600; 310; 30; 20 MG/100ML; MG/100ML; MG/100ML; MG/100ML
125 INJECTION, SOLUTION INTRAVENOUS CONTINUOUS
Status: CANCELLED | OUTPATIENT
Start: 2025-04-22

## 2025-04-22 RX ORDER — ONDANSETRON 2 MG/ML
4 INJECTION INTRAMUSCULAR; INTRAVENOUS ONCE AS NEEDED
Status: CANCELLED | OUTPATIENT
Start: 2025-04-22

## 2025-04-22 RX ORDER — ALBUTEROL SULFATE 0.83 MG/ML
2.5 SOLUTION RESPIRATORY (INHALATION) ONCE AS NEEDED
Status: CANCELLED | OUTPATIENT
Start: 2025-04-22

## 2025-04-23 ENCOUNTER — TELEPHONE (OUTPATIENT)
Dept: BARIATRICS | Facility: CLINIC | Age: 50
End: 2025-04-23

## 2025-04-23 ENCOUNTER — ANESTHESIA (OUTPATIENT)
Dept: GASTROENTEROLOGY | Facility: HOSPITAL | Age: 50
End: 2025-04-23
Payer: COMMERCIAL

## 2025-04-23 ENCOUNTER — HOSPITAL ENCOUNTER (OUTPATIENT)
Dept: GASTROENTEROLOGY | Facility: HOSPITAL | Age: 50
Setting detail: OUTPATIENT SURGERY
Discharge: HOME | End: 2025-04-23
Attending: SURGERY
Payer: COMMERCIAL

## 2025-04-23 ENCOUNTER — ANESTHESIA EVENT (OUTPATIENT)
Dept: GASTROENTEROLOGY | Facility: HOSPITAL | Age: 50
End: 2025-04-23
Payer: COMMERCIAL

## 2025-04-23 VITALS
RESPIRATION RATE: 16 BRPM | OXYGEN SATURATION: 98 % | BODY MASS INDEX: 35.88 KG/M2 | DIASTOLIC BLOOD PRESSURE: 68 MMHG | HEIGHT: 62 IN | HEART RATE: 76 BPM | SYSTOLIC BLOOD PRESSURE: 118 MMHG | WEIGHT: 195 LBS | TEMPERATURE: 97.2 F

## 2025-04-23 DIAGNOSIS — Z98.84 BARIATRIC SURGERY STATUS: ICD-10-CM

## 2025-04-23 LAB
EXT PREGNANCY TEST URINE: NEGATIVE
EXT. CONTROL: NORMAL

## 2025-04-23 PROCEDURE — 88342 IMHCHEM/IMCYTCHM 1ST ANTB: CPT | Performed by: PATHOLOGY

## 2025-04-23 PROCEDURE — 81025 URINE PREGNANCY TEST: CPT | Performed by: ANESTHESIOLOGY

## 2025-04-23 PROCEDURE — 43239 EGD BIOPSY SINGLE/MULTIPLE: CPT | Performed by: SURGERY

## 2025-04-23 PROCEDURE — 88305 TISSUE EXAM BY PATHOLOGIST: CPT | Performed by: PATHOLOGY

## 2025-04-23 PROCEDURE — 88341 IMHCHEM/IMCYTCHM EA ADD ANTB: CPT | Performed by: PATHOLOGY

## 2025-04-23 RX ORDER — SODIUM CHLORIDE, SODIUM LACTATE, POTASSIUM CHLORIDE, CALCIUM CHLORIDE 600; 310; 30; 20 MG/100ML; MG/100ML; MG/100ML; MG/100ML
125 INJECTION, SOLUTION INTRAVENOUS CONTINUOUS
Status: DISCONTINUED | OUTPATIENT
Start: 2025-04-23 | End: 2025-04-27 | Stop reason: HOSPADM

## 2025-04-23 RX ORDER — ALBUTEROL SULFATE 0.83 MG/ML
2.5 SOLUTION RESPIRATORY (INHALATION) ONCE AS NEEDED
Status: DISCONTINUED | OUTPATIENT
Start: 2025-04-23 | End: 2025-04-27 | Stop reason: HOSPADM

## 2025-04-23 RX ORDER — PROPOFOL 10 MG/ML
INJECTION, EMULSION INTRAVENOUS AS NEEDED
Status: DISCONTINUED | OUTPATIENT
Start: 2025-04-23 | End: 2025-04-23

## 2025-04-23 RX ORDER — ONDANSETRON 2 MG/ML
4 INJECTION INTRAMUSCULAR; INTRAVENOUS ONCE AS NEEDED
Status: DISCONTINUED | OUTPATIENT
Start: 2025-04-23 | End: 2025-04-27 | Stop reason: HOSPADM

## 2025-04-23 RX ORDER — LIDOCAINE HYDROCHLORIDE 20 MG/ML
INJECTION, SOLUTION EPIDURAL; INFILTRATION; INTRACAUDAL; PERINEURAL AS NEEDED
Status: DISCONTINUED | OUTPATIENT
Start: 2025-04-23 | End: 2025-04-23

## 2025-04-23 RX ADMIN — PROPOFOL 100 MG: 10 INJECTION, EMULSION INTRAVENOUS at 10:17

## 2025-04-23 RX ADMIN — PROPOFOL 100 MG: 10 INJECTION, EMULSION INTRAVENOUS at 10:18

## 2025-04-23 RX ADMIN — LIDOCAINE HYDROCHLORIDE 60 MG: 20 INJECTION, SOLUTION EPIDURAL; INFILTRATION; INTRACAUDAL at 10:17

## 2025-04-23 RX ADMIN — SODIUM CHLORIDE, SODIUM LACTATE, POTASSIUM CHLORIDE, AND CALCIUM CHLORIDE 125 ML/HR: .6; .31; .03; .02 INJECTION, SOLUTION INTRAVENOUS at 10:11

## 2025-04-23 NOTE — H&P
H&P EXAM - Outpatient Endoscopy  [facility]   Kellen Garcia 49 y.o. female MRN: [id number]  Unit/Bed#:  Encounter: [id number]        Impression: Morbid obesity s/p LSG    Plan:Upper endoscopy and a biopsy to rule out H. Pylori    Chief Complaint: screening R/O dodson's esophagus    Physical Exam: Normal not in acute distress   Chest: Clear to auscultation   Heart: Normal S1 and S2

## 2025-04-23 NOTE — ANESTHESIA POSTPROCEDURE EVALUATION
Post-Op Assessment Note    CV Status:  Stable  Pain Score: 0    Pain management: adequate       Mental Status:  Awake and arousable   Hydration Status:  Euvolemic   PONV Controlled:  Controlled   Airway Patency:  Patent     Post Op Vitals Reviewed: Yes    No anethesia notable event occurred.    Staff: CRNA           Last Filed PACU Vitals:  Vitals Value Taken Time   Temp 97.0    Pulse 74    /68    Resp 18    SpO2 9

## 2025-04-28 ENCOUNTER — APPOINTMENT (OUTPATIENT)
Dept: LAB | Age: 50
End: 2025-04-28
Payer: COMMERCIAL

## 2025-04-28 DIAGNOSIS — Z48.815 ENCOUNTER FOR SURGICAL AFTERCARE FOLLOWING SURGERY OF DIGESTIVE SYSTEM: ICD-10-CM

## 2025-04-28 DIAGNOSIS — Z98.84 BARIATRIC SURGERY STATUS: ICD-10-CM

## 2025-04-28 DIAGNOSIS — K91.2 POSTSURGICAL MALABSORPTION: ICD-10-CM

## 2025-04-28 DIAGNOSIS — E56.9 INADEQUATE VITAMIN INTAKE: ICD-10-CM

## 2025-04-28 DIAGNOSIS — E66.812 OBESITY, CLASS II, BMI 35-39.9: ICD-10-CM

## 2025-04-28 LAB
25(OH)D3 SERPL-MCNC: 29.9 NG/ML (ref 30–100)
ERYTHROCYTE [DISTWIDTH] IN BLOOD BY AUTOMATED COUNT: 13.3 % (ref 11.6–15.1)
FERRITIN SERPL-MCNC: 19 NG/ML (ref 30–307)
FOLATE SERPL-MCNC: >22.3 NG/ML
HCT VFR BLD AUTO: 41.6 % (ref 34.8–46.1)
HGB BLD-MCNC: 13.8 G/DL (ref 11.5–15.4)
IRON SATN MFR SERPL: 20 % (ref 15–50)
IRON SERPL-MCNC: 90 UG/DL (ref 50–212)
MCH RBC QN AUTO: 28.8 PG (ref 26.8–34.3)
MCHC RBC AUTO-ENTMCNC: 33.2 G/DL (ref 31.4–37.4)
MCV RBC AUTO: 87 FL (ref 82–98)
PLATELET # BLD AUTO: 345 THOUSANDS/UL (ref 149–390)
PMV BLD AUTO: 10.1 FL (ref 8.9–12.7)
PTH-INTACT SERPL-MCNC: 64 PG/ML (ref 12–88)
RBC # BLD AUTO: 4.79 MILLION/UL (ref 3.81–5.12)
TIBC SERPL-MCNC: 459.2 UG/DL (ref 250–450)
TRANSFERRIN SERPL-MCNC: 328 MG/DL (ref 203–362)
UIBC SERPL-MCNC: 369 UG/DL (ref 155–355)
VIT B12 SERPL-MCNC: 466 PG/ML (ref 180–914)
WBC # BLD AUTO: 7.46 THOUSAND/UL (ref 4.31–10.16)

## 2025-04-28 PROCEDURE — 83970 ASSAY OF PARATHORMONE: CPT

## 2025-04-28 PROCEDURE — 84590 ASSAY OF VITAMIN A: CPT

## 2025-04-28 PROCEDURE — 82607 VITAMIN B-12: CPT

## 2025-04-28 PROCEDURE — 82728 ASSAY OF FERRITIN: CPT

## 2025-04-28 PROCEDURE — 36415 COLL VENOUS BLD VENIPUNCTURE: CPT

## 2025-04-28 PROCEDURE — 84425 ASSAY OF VITAMIN B-1: CPT

## 2025-04-28 PROCEDURE — 80053 COMPREHEN METABOLIC PANEL: CPT

## 2025-04-28 PROCEDURE — 83550 IRON BINDING TEST: CPT

## 2025-04-28 PROCEDURE — 83540 ASSAY OF IRON: CPT

## 2025-04-28 PROCEDURE — 82306 VITAMIN D 25 HYDROXY: CPT

## 2025-04-28 PROCEDURE — 83918 ORGANIC ACIDS TOTAL QUANT: CPT

## 2025-04-28 PROCEDURE — 82746 ASSAY OF FOLIC ACID SERUM: CPT

## 2025-04-28 PROCEDURE — 85027 COMPLETE CBC AUTOMATED: CPT

## 2025-04-28 PROCEDURE — 84630 ASSAY OF ZINC: CPT

## 2025-04-29 LAB
ALBUMIN SERPL BCG-MCNC: 4.1 G/DL (ref 3.5–5)
ALP SERPL-CCNC: 63 U/L (ref 34–104)
ALT SERPL W P-5'-P-CCNC: 29 U/L (ref 7–52)
ANION GAP SERPL CALCULATED.3IONS-SCNC: 14 MMOL/L (ref 4–13)
AST SERPL W P-5'-P-CCNC: 21 U/L (ref 13–39)
BILIRUB SERPL-MCNC: 0.83 MG/DL (ref 0.2–1)
BUN SERPL-MCNC: 14 MG/DL (ref 5–25)
CALCIUM SERPL-MCNC: 9.3 MG/DL (ref 8.4–10.2)
CHLORIDE SERPL-SCNC: 104 MMOL/L (ref 96–108)
CO2 SERPL-SCNC: 21 MMOL/L (ref 21–32)
CREAT SERPL-MCNC: 0.71 MG/DL (ref 0.6–1.3)
GFR SERPL CREATININE-BSD FRML MDRD: 101 ML/MIN/1.73SQ M
GLUCOSE P FAST SERPL-MCNC: 77 MG/DL (ref 65–99)
POTASSIUM SERPL-SCNC: 4.4 MMOL/L (ref 3.5–5.3)
PROT SERPL-MCNC: 6.8 G/DL (ref 6.4–8.4)
SODIUM SERPL-SCNC: 139 MMOL/L (ref 135–147)

## 2025-04-29 PROCEDURE — 88342 IMHCHEM/IMCYTCHM 1ST ANTB: CPT | Performed by: PATHOLOGY

## 2025-04-29 PROCEDURE — 88341 IMHCHEM/IMCYTCHM EA ADD ANTB: CPT | Performed by: PATHOLOGY

## 2025-04-29 PROCEDURE — 88305 TISSUE EXAM BY PATHOLOGIST: CPT | Performed by: PATHOLOGY

## 2025-04-30 LAB
METHYLMALONATE SERPL-SCNC: 118 NMOL/L (ref 0–378)
VIT A SERPL-MCNC: 46.7 UG/DL (ref 20.1–62)
ZINC SERPL-MCNC: 115 UG/DL (ref 44–115)

## 2025-05-01 LAB — VIT B1 BLD-SCNC: 170.3 NMOL/L (ref 66.5–200)

## 2025-05-02 ENCOUNTER — RESULTS FOLLOW-UP (OUTPATIENT)
Dept: BARIATRICS | Facility: CLINIC | Age: 50
End: 2025-05-02

## 2025-05-02 DIAGNOSIS — Z98.84 BARIATRIC SURGERY STATUS: Primary | ICD-10-CM

## 2025-05-02 DIAGNOSIS — R79.0 LOW FERRITIN: ICD-10-CM

## 2025-05-02 DIAGNOSIS — E55.9 VITAMIN D DEFICIENCY: ICD-10-CM

## 2025-05-12 ENCOUNTER — OFFICE VISIT (OUTPATIENT)
Dept: BARIATRICS | Facility: CLINIC | Age: 50
End: 2025-05-12
Payer: COMMERCIAL

## 2025-05-12 VITALS
SYSTOLIC BLOOD PRESSURE: 116 MMHG | HEART RATE: 71 BPM | BODY MASS INDEX: 36.34 KG/M2 | WEIGHT: 197.5 LBS | HEIGHT: 62 IN | DIASTOLIC BLOOD PRESSURE: 70 MMHG | TEMPERATURE: 97.5 F

## 2025-05-12 DIAGNOSIS — Z86.69 HX OF SLEEP APNEA: ICD-10-CM

## 2025-05-12 DIAGNOSIS — Z98.84 BARIATRIC SURGERY STATUS: ICD-10-CM

## 2025-05-12 DIAGNOSIS — E78.5 HLD (HYPERLIPIDEMIA): ICD-10-CM

## 2025-05-12 DIAGNOSIS — Z48.815 ENCOUNTER FOR SURGICAL AFTERCARE FOLLOWING SURGERY OF DIGESTIVE SYSTEM: Primary | ICD-10-CM

## 2025-05-12 DIAGNOSIS — E66.812 OBESITY, CLASS II, BMI 35-39.9: ICD-10-CM

## 2025-05-12 PROCEDURE — 99214 OFFICE O/P EST MOD 30 MIN: CPT | Performed by: NURSE PRACTITIONER

## 2025-05-12 RX ORDER — TIRZEPATIDE 2.5 MG/.5ML
2.5 INJECTION, SOLUTION SUBCUTANEOUS WEEKLY
Qty: 2 ML | Refills: 0 | Status: SHIPPED | OUTPATIENT
Start: 2025-05-12 | End: 2025-06-09

## 2025-05-12 NOTE — PROGRESS NOTES
Date of surgery:5/8/2018  Procedure: Sleeve  Performing surgeon:Dr. VERN Orantes    Initial Weight - 246 lb  Current Weight -197.5 lb  Gary Weight -   Total Body Weight Loss (EWL)- 48.9%  EWL% - 46%  TWB % -20%    Who referred the patient? Please provide name:

## 2025-05-12 NOTE — PROGRESS NOTES
Assessment/Plan:    OBESITY II/BMI 36/HLD  -Discussed role of weight loss medications.  -Initial weight loss goal of 5-10% weight loss for improved overall health  -Reviewed Screening labs - Labs shows elevated cholesterol with elevated LDL (164)  Lab Results   Component Value Date    CHOLESTEROL 241 (H) 09/16/2024    CHOLESTEROL 247 (H) 02/25/2023    CHOLESTEROL 214 (H) 02/11/2022     Lab Results   Component Value Date    HDL 58 09/16/2024    HDL 51 02/25/2023    HDL 55 02/11/2022     Lab Results   Component Value Date    TRIG 97 09/16/2024    TRIG 96 02/25/2023    TRIG 82 02/11/2022     Lab Results   Component Value Date    NONHDLC 159 02/11/2022    NONHDLC 173 10/25/2019    NONHDLC 161 05/15/2019      -patient has maintained/performed healthy dietary changes and increased physical activity for at least 6 months prior to initiation of AOMs; anticipate with further weight loss, cholesterol levels would improve as well.   -Patient is interested in switching from wegovy to zepbound since she has reached a weight plateau with wegovy. Has been on wegovy 2.4 mg/week and tolerating this well without issues.     Highest weight 246.5 lbs  Initial weight with MWM - 227.4 lbs - with saxenda (May 2023); 215 lbs - with Wegovy (sept 2023)  Last  lbs - 11/18/2024 - on wegovy 2.4 mg/week  Current Weight  197.5 lbs (+8.5 lbs)  Gary 169 lbs   Goal - 180 lbs   5% weight loss - 9.9 lbs (187.6 lbs)  20% weight loss - 39.5 lbs (158 lbs)    - will switch to zepbound due to some weight gain. She is agreeable to plan.   Medication Contract Signed   Discussed expected weight loss of approximately 20% along with lifestyle modifications  Discussed risks/side effects of medication and demonstrated pen device  Recommend small/low fat meals and stop eating when full to avoid side effects.  Discussed importance of adequate protein intake and strength training to reduce risk of muscle loss.   Discussed need to stop medication for at  least 1 week prior to planned surgery/endoscopy  Denies hx Pancreatitis or FH of Medullary cell Thyroid CA/MEN2 syndrome    Start Zepbound  2.5mg weekly x 4 weeks and titrate  Advised to contact office in 2 weeks with update regarding tolerability and at that time will increase to 5mg dose if tolerating medication with no significant side effects.           Follow up in approximately 3 months with Surgical Advanced Practitioner.  Goals:  Food log (ie.) www.[email or web address].[email or web address],sparkpeople.com,loseit.[email or web address],[employer].com,etc. baritastic  No sugary beverages. At least 64oz of water daily.  Increase physical activity by 10 minutes daily. Gradually increase physical activity to a goal of 5 days per week for 30 minutes of MODERATE intensity PLUS 2 days per week of FULL BODY resistance training  5-10 servings of fruits and vegetables per day and 25-35 grams of dietary fiber per day, gradually increasing  No sugary beverages. At least 64oz of water daily., Increase physical activity by 10 minutes daily, Practice lesson plans 1-6 in bariatric manual , Practice 30/60 rule, Gradually increase physical activity to a goal of 5 days per week for 30 minutes of MODERATE intensity PLUS 2 days per week of FULL BODY resistance training, Continue with dietary and behavioral recommendations outlined in bariatric manual, Continue to take recommended bariatric vitamins as directed, Goal protein intake of 60-80 grams per day, 5-10 servings of fruits and vegetables per day, 25-35 grams of dietary fiber per day, and 0911-6535 calories per day    Bariatric surgery status - reviewed EGD. Unremarkable. Recommended EGD screening in 3 years or sooner if needed.     - hx of MILTON - with CPAP use but currently not using ever since her bariatric surgery.. Will continue to monitor for now. Discussed further weight loss to prevent reoccurrence.       Diagnoses and all orders for this visit:    Encounter for surgical aftercare following surgery of digestive  "system    Bariatric surgery status    Obesity, Class II, BMI 35-39.9  -     tirzepatide (Zepbound) 2.5 mg/0.5 mL auto-injector; Inject 0.5 mL (2.5 mg total) under the skin once a week for 28 days    BMI 36.0-36.9,adult  -     tirzepatide (Zepbound) 2.5 mg/0.5 mL auto-injector; Inject 0.5 mL (2.5 mg total) under the skin once a week for 28 days    Hx of sleep apnea  -     tirzepatide (Zepbound) 2.5 mg/0.5 mL auto-injector; Inject 0.5 mL (2.5 mg total) under the skin once a week for 28 days    HLD (hyperlipidemia)        Subjective:   Chief Complaint   Patient presents with    Follow-up      EGD  - Review      Patient ID: Kellen Garcia  is a 49 y.o. female with excess weight/obesity here to pursue medical weight management. -s/p Vertical Sleeve Gastrectomy with Dr. Nikhil Orantes on 05/08/2018. Here for [facility] follow up and EGD review.    Past Medical History:   Diagnosis Date    Anemia     Bariatric surgery status     CPAP (continuous positive airway pressure) dependence     Depression     GERD (gastroesophageal reflux disease)     History of anxiety     Left knee pain     \"occas\"    Low back pain     \"mayra if standing for a while\"    Morbid obesity (HCC)     Motion sickness     Obesity     PONV (postoperative nausea and vomiting)     Postgastrectomy malabsorption     Prediabetes     Right ankle pain     \" occas\"    Shortness of breath     \"exertional\"    Sleep apnea     Mild    Walks frequently     for exercise    Wears glasses        HPI:  Obesity/Excess Weight:   Was on saxenda then switched to wegovy. She is currently on wegovy 2.4 mg/week but weight has plateau. Admits to decrease appetite, hunger and cravings with wegovy. Currently no adverse effects. Denies any other side effects either with wegovy.       BMI 36.12  Severity: Moderate  Onset:  lifelong     Modifiers: Diet and Exercise, Commercial Weight Loss Programs-ie. Weight Watchers, [employer], Nutrisystem, etc., Prescription Weight Loss Medications, and " bariatric surgery; has tried wellbutrin, saxenda and currently on wegovy.   Contributing factors: Poor Food Choices, Insufficient Physical Activity, Stress/Emotional Eating, Lack of knowledge of appropriate lifestyle changes, and Insufficient time to make appropriate lifestyle changes  Associated symptoms: comorbid conditions, fatigue, increased joint pain, decreased exercise capacity, body image issues, decreased self esteem, increased shortness of breath, decreased mobility, depression, inability to do certain activities, and clothes do not fit  Colonoscopy-Completed    Highest weight 246.5 lbs  Initial weight with MWM - 227.4 lbs - with saxenda (May 2023); 215 lbs - with Wegovy (sept 2023)  Last  lbs - 11/18/2024 - on wegovy 2.4 mg/week  Current Weight  197.5 lbs (+8.5 lbs)  Gary 169 lbs   Goal - 180 lbs   5% weight loss - 9.9 lbs (187.6 lbs)  20% weight loss - 39.5 lbs (158 lbs)    Hydration: 36 oz of water, at times unsweetened ice tea  Alcohol:  none  Exercise:  moderate - walking, pickle ball, active with work and trying to increase steps outside with the nice weather.  Dining out:  NONE  Occupation:  full time and part time at grocery store - walking often.   Sleep: 6 HOURS OF SLEEP   STOPBANG: has a hx of MILTON. Not currently on CPAP at this time.     B: egg with tortilla  S: yogurt or protein bar  L: cheese with lunch meat; sometimes a salad  S: sometimes - may be fruit  D: protein, vegetables, sometimes potatoes or rice  S: none     Wt Readings from Last 3 Encounters:   05/12/25 89.6 kg (197 lb 8 oz)   04/23/25 88.5 kg (195 lb)   02/19/25 87.1 kg (192 lb)           Patient is not pregnant/breastfeeding (metformin only)  Patient denies personal and family history of  pancreatitis, thyroid cancer, MEN-2 tumors.   Denies any hx of glaucoma, seizures, kidney stones, gallstones.  Denies Hx of CAD, PAD, palpitations, arrhythmia, uncontrolled HTN, hyperthyroidism or use of stimulant  "medications     Has used wellbutrin in the past for depression but with secondary benefits of weight loss but this was ineffective. Denies uncontrolled anxiety or depression, suicidal behavior or thinking , insomnia or sleep disturbance.  (Contraindicated for phentermine or wellbutrin)        Bariatric surgery status - had an EGD due to screening purposes.     FINDINGS:  Regular Z-line 38 cm from the incisors  Previous sleeve gastrectomy in the stomach  The esophagus, stomach and 2nd part of the duodenum appeared normal.  Performed forceps biopsies in the stomach to rule out H. pylori        SPECIMENS:  * No specimens in log *        IMPRESSION:  Previous sleeve gastrectomy in the stomach  The esophagus, stomach and 2nd part of the duodenum appeared normal.  Performed forceps biopsies in the stomach to rule out H. Pylori    Final Diagnosis   A. Stomach, gastric antrum, biopsy:  -   Gastric antral and transitional mucosa with reactive gastropathy, mild chronic inactive gastritis, and intestinal metaplasia (see comment).   -   Negative for dysplasia.  -   Negative for Helicobacter pylori-type organisms on H&E stain.           The following portions of the patient's history were reviewed and updated as appropriate: allergies, current medications, past family history, past medical history, past social history, past surgical history, and problem list.    Past Medical History:   Diagnosis Date    Anemia     Bariatric surgery status     CPAP (continuous positive airway pressure) dependence     Depression     GERD (gastroesophageal reflux disease)     History of anxiety     Left knee pain     \"occas\"    Low back pain     \"mayra if standing for a while\"    Morbid obesity (HCC)     Motion sickness     Obesity     PONV (postoperative nausea and vomiting)     Postgastrectomy malabsorption     Prediabetes     Right ankle pain     \" occas\"    Shortness of breath     \"exertional\"    Sleep apnea     Mild    Walks frequently     for " "exercise    Wears glasses      Past Surgical History:   Procedure Laterality Date    ABDOMINAL SURGERY      ANKLE SURGERY Bilateral     Right with screws and plate implant. Left ACL repair Resolved: 1997    ANTERIOR CRUCIATE LIGAMENT REPAIR Left     screws implanted    BARIATRIC SURGERY  05/08/2018    KNEE CARTILAGE SURGERY      right    KNEE SURGERY Left     [place] EGD TRANSORAL BIOPSY SINGLE/MULTIPLE N/A 03/21/2018    Procedure: ESOPHAGOGASTRODUODENOSCOPY (EGD) with bx;  Surgeon: Rex Orantes MD;  Location: [facility];  Service: Bariatrics    [place] LAPS GSTRC RSTRICTIV PX LONGITUDINAL GASTRECTOMY N/A 05/08/2018    Procedure: LAPAROSCOPIC SLEEVE GASTRECTOMY WITH ROBOTICS;  Surgeon: Rex Orantes MD;  Location: [facility];  Service: Bariatrics    REDUCTION MAMMAPLASTY Bilateral     Resolved: 2004    SLEEVE GASTROPLASTY      WISDOM TOOTH EXTRACTION         Current Outpatient Medications:     CALCIUM PO, Take 1 each by mouth 3 (three) times a day, Disp: , Rfl:     Diclofenac Sodium (VOLTAREN) 1 %, Apply 2 g topically 4 (four) times a day, Disp: 50 g, Rfl: 1    Multiple Vitamin (MULTIVITAMIN) tablet, Take 1 tablet by mouth daily, Disp: , Rfl:     tirzepatide (Zepbound) 2.5 mg/0.5 mL auto-injector, Inject 0.5 mL (2.5 mg total) under the skin once a week for 28 days, Disp: 2 mL, Rfl: 0    Review of Systems   Constitutional:  Positive for unexpected weight change.   Respiratory: Negative.     Cardiovascular: Negative.    Gastrointestinal: Negative.    Musculoskeletal: Negative.    Neurological: Negative.    Psychiatric/Behavioral: Negative.         Objective:    /70 (Patient Position: Sitting, Cuff Size: Standard)   Pulse 71   Temp 97.5 °F (36.4 °C) (Tympanic)   Ht 5' 2\" (1.575 m)   Wt 89.6 kg (197 lb 8 oz)   LMP 04/16/2025   BMI 36.12 kg/m²     Physical Exam  Vitals and nursing note reviewed.   Constitutional:       Appearance: Normal appearance. She is obese.   Cardiovascular:      Rate and Rhythm: Normal rate " and regular rhythm.      Pulses: Normal pulses.      Heart sounds: Normal heart sounds.   Pulmonary:      Effort: Pulmonary effort is normal.      Breath sounds: Normal breath sounds.   Abdominal:      General: Bowel sounds are normal.      Palpations: Abdomen is soft.      Tenderness: There is no abdominal tenderness.   Musculoskeletal:         General: Normal range of motion.   Skin:     General: Skin is warm and dry.   Neurological:      General: No focal deficit present.      Mental Status: She is alert and oriented to person, place, and time.   Psychiatric:         Mood and Affect: Mood normal.         Behavior: Behavior normal.         Thought Content: Thought content normal.         Judgment: Judgment normal.

## 2025-05-13 ENCOUNTER — TELEPHONE (OUTPATIENT)
Dept: BARIATRICS | Facility: CLINIC | Age: 50
End: 2025-05-13

## 2025-05-13 NOTE — TELEPHONE ENCOUNTER
PA for Zepbound 2.5 mg SUBMITTED to [facility]    via    [x]CMM-KEY:( [id number] )  [x]Surescripts-Case ID #   []Availity-Auth ID # NDC #   []Faxed to plan   []Other website   []Phone call Case ID #     [x]PA sent as URGENT    All office notes, labs and other pertaining documents and studies sent. Clinical questions answered. Awaiting determination from insurance company.     Turnaround time for your insurance to make a decision on your Prior Authorization can take 7-21 business days.

## 2025-05-13 NOTE — TELEPHONE ENCOUNTER
PA for Zepbound 2.5 mg  APPROVED [employer]    Date(s) approved 05/13/2025  to 5/13/2026     Case #    Patient advised by   LVM to PT for her APPROVAL .       [x]MyChart Message  [x]Phone call   []LMOM  []L/M to call office as no active Communication consent on file  []Unable to leave detailed message as VM not approved on Communication consent       Pharmacy advised by    [x]Fax  []Phone call  []Secure Chat    Specialty Pharmacy    []     Approval letter scanned into Media No      PA Case: [id number], Status: Approved, Coverage Starts on: 5/13/2025 12:00 AM, Coverage Ends on: 5/13/2026 12:00 AM. Questions? Contact [phone].. Authorization Expiration Date: May 13, 2026.

## 2025-05-29 ENCOUNTER — TELEPHONE (OUTPATIENT)
Dept: BARIATRICS | Facility: CLINIC | Age: 50
End: 2025-05-29

## 2025-06-10 DIAGNOSIS — E66.812 OBESITY, CLASS II, BMI 35-39.9: Primary | ICD-10-CM

## 2025-06-10 DIAGNOSIS — Z86.69 HX OF SLEEP APNEA: ICD-10-CM

## 2025-06-10 RX ORDER — TIRZEPATIDE 5 MG/.5ML
5 INJECTION, SOLUTION SUBCUTANEOUS WEEKLY
Qty: 2 ML | Refills: 2 | Status: SHIPPED | OUTPATIENT
Start: 2025-06-10

## 2025-08-01 ENCOUNTER — TELEPHONE (OUTPATIENT)
Dept: BARIATRICS | Facility: CLINIC | Age: 50
End: 2025-08-01

## 2025-08-06 ENCOUNTER — TELEPHONE (OUTPATIENT)
Dept: BARIATRICS | Facility: CLINIC | Age: 50
End: 2025-08-06

## (undated) DEVICE — GLOVE INDICATOR PI UNDERGLOVE SZ 7.5 BLUE

## (undated) DEVICE — ENDOPATH XCEL BLADELESS TROCARS WITH STABILITY SLEEVES: Brand: ENDOPATH XCEL

## (undated) DEVICE — ABSORBABLE WOUND CLOSURE DEVICE: Brand: V-LOC 90

## (undated) DEVICE — COVIDIEN GIA BLACK (XTRA THICK) RELOAD

## (undated) DEVICE — GLOVE SRG BIOGEL 6.5

## (undated) DEVICE — GLOVE SRG BIOGEL 6

## (undated) DEVICE — CADIERE FORCEPS: Brand: ENDOWRIST;DAVINCI SI

## (undated) DEVICE — DRAPE FLUID WARMER (BIRD BATH)

## (undated) DEVICE — ROBOT INST CANNULA 8MM OBTURATOR BLUNT DISP

## (undated) DEVICE — CHLORAPREP HI-LITE 26ML ORANGE

## (undated) DEVICE — NEEDLE SPINAL 22G X 3.5IN  QUINCKE

## (undated) DEVICE — COVIDIEN ENDO GIA PURPLE (MED) RELOAD 60MM

## (undated) DEVICE — DRAPE TOWEL: Brand: CONVERTORS

## (undated) DEVICE — [HIGH FLOW INSUFFLATOR,  DO NOT USE IF PACKAGE IS DAMAGED,  KEEP DRY,  KEEP AWAY FROM SUNLIGHT,  PROTECT FROM HEAT AND RADIOACTIVE SOURCES.]: Brand: PNEUMOSURE

## (undated) DEVICE — GLOVE SRG BIOGEL 7

## (undated) DEVICE — MEGASUTURECUT ND: Brand: ENDOWRIST;DAVINCI SI

## (undated) DEVICE — PMI DISPOSABLE PUNCTURE CLOSURE DEVICE / SUTURE GRASPER: Brand: PMI

## (undated) DEVICE — GLOVE SRG BIOGEL 7.5

## (undated) DEVICE — SUT MONOCRYL 4-0 PS-2 27 IN Y426H

## (undated) DEVICE — BETHLEHEM UNIVERSAL MINOR GEN: Brand: CARDINAL HEALTH

## (undated) DEVICE — VESSEL SEALER: Brand: ENDOWRIST;DAVINCI SI

## (undated) DEVICE — ROBOT ACCESSORY KIT 4 ARM

## (undated) DEVICE — TUBING SMOKE EVAC W/FILTRATION DEVICE PLUMEPORT ACTIV

## (undated) DEVICE — ADHESIVE SKIN CLSR DERMABOND NX

## (undated) DEVICE — VIOLET BRAIDED (POLYGLACTIN 910), SYNTHETIC ABSORBABLE SUTURE: Brand: COATED VICRYL

## (undated) DEVICE — STAPLER ENDO GIA ULTRA XL 26CM

## (undated) DEVICE — 3000CC GUARDIAN II: Brand: GUARDIAN

## (undated) DEVICE — SINGLE-USE BIOPSY FORCEPS: Brand: RADIAL JAW 4

## (undated) DEVICE — ENDOPATH PNEUMONEEDLE INSUFFLATION NEEDLES WITH LUER LOCK CONNECTORS 120MM: Brand: ENDOPATH

## (undated) DEVICE — 2000CC GUARDIAN II: Brand: GUARDIAN

## (undated) DEVICE — INTENDED FOR TISSUE SEPARATION, AND OTHER PROCEDURES THAT REQUIRE A SHARP SURGICAL BLADE TO PUNCTURE OR CUT.: Brand: BARD-PARKER SAFETY BLADES SIZE 15, STERILE

## (undated) DEVICE — IRRIG ENDO FLO TUBING

## (undated) DEVICE — WEBRIL 6 IN UNSTERILE

## (undated) DEVICE — TIBURON LAPAROSCOPIC ABDOMINAL DRAPE: Brand: CONVERTORS

## (undated) DEVICE — VISIGI 3D®  CALIBRATION SYSTEM  SIZE 36FR SLEEVE/STD: Brand: BOEHRINGER® VISIGI 3D™ SLEEVE GASTRECTOMY CALIBRATION SYSTEM, SIZE 36FR

## (undated) DEVICE — SURGICAL GOWN, XL SMARTSLEEVE: Brand: CONVERTORS

## (undated) DEVICE — SCD SEQUENTIAL COMPRESSION COMFORT SLEEVE MEDIUM KNEE LENGTH: Brand: KENDALL SCD

## (undated) DEVICE — ANTI-FOG SOLUTION WITH FOAM PAD: Brand: DEVON